# Patient Record
Sex: FEMALE | Race: WHITE | NOT HISPANIC OR LATINO | Employment: OTHER | URBAN - METROPOLITAN AREA
[De-identification: names, ages, dates, MRNs, and addresses within clinical notes are randomized per-mention and may not be internally consistent; named-entity substitution may affect disease eponyms.]

---

## 2017-01-04 ENCOUNTER — OFFICE VISIT (OUTPATIENT)
Dept: ALLERGY | Facility: CLINIC | Age: 27
End: 2017-01-04
Payer: MEDICAID

## 2017-01-04 ENCOUNTER — TELEPHONE (OUTPATIENT)
Dept: ALLERGY | Facility: CLINIC | Age: 27
End: 2017-01-04

## 2017-01-04 VITALS
BODY MASS INDEX: 33.55 KG/M2 | SYSTOLIC BLOOD PRESSURE: 110 MMHG | WEIGHT: 170.88 LBS | HEART RATE: 67 BPM | OXYGEN SATURATION: 100 % | DIASTOLIC BLOOD PRESSURE: 78 MMHG | HEIGHT: 60 IN

## 2017-01-04 DIAGNOSIS — J31.0 CHRONIC RHINITIS: ICD-10-CM

## 2017-01-04 DIAGNOSIS — J45.21 MILD INTERMITTENT ASTHMA WITH ACUTE EXACERBATION: Primary | ICD-10-CM

## 2017-01-04 PROCEDURE — 99214 OFFICE O/P EST MOD 30 MIN: CPT | Mod: S$PBB,,, | Performed by: ALLERGY & IMMUNOLOGY

## 2017-01-04 PROCEDURE — 99999 PR PBB SHADOW E&M-EST. PATIENT-LVL III: CPT | Mod: PBBFAC,,, | Performed by: ALLERGY & IMMUNOLOGY

## 2017-01-04 PROCEDURE — 99213 OFFICE O/P EST LOW 20 MIN: CPT | Mod: PBBFAC,PO | Performed by: ALLERGY & IMMUNOLOGY

## 2017-01-04 RX ORDER — ALBUTEROL SULFATE 90 UG/1
AEROSOL, METERED RESPIRATORY (INHALATION)
Qty: 8.5 G | Refills: 2 | Status: SHIPPED | OUTPATIENT
Start: 2017-01-04 | End: 2018-04-06 | Stop reason: SDUPTHER

## 2017-01-04 NOTE — TELEPHONE ENCOUNTER
Spoke to pt, she said insurance is denying inhalers - dr Cox sent in different one to see if it will work.

## 2017-01-04 NOTE — PROGRESS NOTES
Subjective:       Patient ID: Carlos Coulter is a 26 y.o. female.    Chief Complaint:  Asthma (shortness of breath, needing to use the inhaler more.)      HPI Comments: 26 year-old woman presents for continued evaluation of chronic rhinitis and mild asthma. She was last seen 1/18/2016. She had immunocaps drawn which were all negative. She had PFTs done which were normal. However if she does not stay on Q olivia daily she needs albuterol daily.In last few months she has more HARKINS> she walks daily and is ok but has a puppy and if walks faster or chases him she gets SOB. She is using albuterol most days right now. No night symptoms. She is also trying to get pregnant.  She takes Q olivia 80 1 puff BID. She does also take Singulair, Flonase and zyrtec all daily and this helps. She has had no rhinitis recently. She had a recent URI, had to use nebulizer at that time but resolved. No other infections.      Prior History taken 7/6/15: new patient evaluation of allergy and asthma. She is accompanied by her mom who is pt here for asthma. She has bipolar and is not best historian but is able to answer all questions, needs to be redirected often.   1. Asthma. She states she ha shad since was a baby. As small child (1-3) she was hospitalized for asthma, never ICU or intubated. Since then not been to ER or hospital. She rarely has flares where needs oral steroids. She does find she gets out of breath easily with walking far or chasing her 3 yo nephew. When this occurs she usually stops and rest and resolves. Rarely uses inhaler. She has Q olivia supposed to be on BID but does not do and rarely uses albuterol. No night symptoms. No oral steroids in many years.  2. Rhinitis. She is much worse in spring and some in summer. She gets congestion. Ears hurt, sore throat, stuffy nose, headaches, runny nose, sneeze and itchy watery eyes. Worse in evening time. Worse outside. Worse around dust and around some flowers. Takes Singulair  daily but no antihistamines. Takes Flonase daily but does have days where forgets. She had ear tubes as baby and had hearing loss as child which recovered. Not sure if ever had allergy test.  3. Food allergy. She reports pineapple caused her tongue and throat to swell and had to rush to ER. Grapefruit causes a rash on face and tongue swelling. She also for past year has trouble swallowing. Yesterday had rice get stuck in her throat. Dad and grandmother have history of dilation. She does often feel something come up in throat, like reflux.  No insect or latex allergy. Med allergies as per card.    Asthma   She complains of cough and shortness of breath. There is no wheezing. Associated symptoms include headaches, postnasal drip and rhinorrhea. Pertinent negatives include no appetite change, chest pain, ear pain, fever, myalgias, sneezing, sore throat or trouble swallowing. Her past medical history is significant for asthma.       Environmental History: Pets in the home: dogs (3) and cats (2).  see history section for home environment  Review of Systems   Constitutional: Negative for activity change, appetite change, chills, fatigue, fever and unexpected weight change.   HENT: Positive for hearing loss, nosebleeds, postnasal drip and rhinorrhea. Negative for congestion, drooling, ear discharge, ear pain, facial swelling, mouth sores, sinus pressure, sneezing, sore throat, tinnitus, trouble swallowing and voice change.    Eyes: Positive for itching. Negative for photophobia, pain, discharge and visual disturbance.   Respiratory: Positive for cough and shortness of breath. Negative for chest tightness, wheezing and stridor.    Cardiovascular: Negative for chest pain and palpitations.   Gastrointestinal: Negative for abdominal pain, constipation, diarrhea, nausea and vomiting.   Genitourinary: Negative for difficulty urinating.   Musculoskeletal: Negative for arthralgias, joint swelling, myalgias and neck pain.   Skin:  Negative for color change, pallor, rash and wound.   Allergic/Immunologic: Positive for environmental allergies and food allergies. Negative for immunocompromised state.   Neurological: Positive for headaches. Negative for dizziness, syncope, weakness, light-headedness and numbness.   Hematological: Negative for adenopathy. Does not bruise/bleed easily.   Psychiatric/Behavioral: Negative for self-injury and sleep disturbance.        Objective:    Physical Exam   Constitutional: She is oriented to person, place, and time. She appears well-developed and well-nourished. No distress.   HENT:   Head: Normocephalic and atraumatic.   Right Ear: Hearing, tympanic membrane, external ear and ear canal normal.   Left Ear: Hearing, tympanic membrane, external ear and ear canal normal.   Nose: No mucosal edema, rhinorrhea, sinus tenderness or septal deviation. No epistaxis. Right sinus exhibits no maxillary sinus tenderness and no frontal sinus tenderness. Left sinus exhibits no maxillary sinus tenderness and no frontal sinus tenderness.   Mouth/Throat: Uvula is midline, oropharynx is clear and moist and mucous membranes are normal. No uvula swelling.   Eyes: Conjunctivae are normal. Right eye exhibits no discharge. Left eye exhibits no discharge.   Neck: Normal range of motion. No thyromegaly present.   Cardiovascular: Normal rate, regular rhythm and normal heart sounds.    No murmur heard.  Pulmonary/Chest: Effort normal and breath sounds normal. No respiratory distress. She has no wheezes.   Abdominal: Soft. She exhibits no distension. There is no tenderness.   Musculoskeletal: Normal range of motion. She exhibits no edema or tenderness.   Lymphadenopathy:     She has no cervical adenopathy.   Neurological: She is alert and oriented to person, place, and time.   Skin: Skin is warm and dry. No rash noted. No erythema.   Psychiatric: She has a normal mood and affect. Her behavior is normal. Judgment and thought content normal.    Nursing note and vitals reviewed.      Laboratory:   none performed   Assessment:       1. Mild intermittent asthma with acute exacerbation    2. Chronic rhinitis         Plan:       1. Increase Q olivia 80 mcg to 2 puff BID  2. Continue Singulair daily and zyrtec or Claritin daily  3. Continue Nasacort 2 SEN daily  4. albuterol 2 puffs every 4 hours as needed  5. Advised once pregnant may change to Pulmicort and Rhinocort  6. RTC annually or sooner if needed

## 2017-01-05 ENCOUNTER — OFFICE VISIT (OUTPATIENT)
Dept: OBSTETRICS AND GYNECOLOGY | Facility: CLINIC | Age: 27
End: 2017-01-05
Payer: MEDICAID

## 2017-01-05 VITALS
BODY MASS INDEX: 33.38 KG/M2 | HEART RATE: 62 BPM | DIASTOLIC BLOOD PRESSURE: 20 MMHG | WEIGHT: 170 LBS | HEIGHT: 60 IN | SYSTOLIC BLOOD PRESSURE: 118 MMHG

## 2017-01-05 DIAGNOSIS — N92.6 IRREGULAR PERIODS: Primary | ICD-10-CM

## 2017-01-05 LAB
B-HCG UR QL: NEGATIVE
CTP QC/QA: YES

## 2017-01-05 PROCEDURE — 99999 PR PBB SHADOW E&M-EST. PATIENT-LVL III: CPT | Mod: PBBFAC,,, | Performed by: OBSTETRICS & GYNECOLOGY

## 2017-01-05 PROCEDURE — 99213 OFFICE O/P EST LOW 20 MIN: CPT | Mod: PBBFAC,PO | Performed by: OBSTETRICS & GYNECOLOGY

## 2017-01-05 PROCEDURE — 81025 URINE PREGNANCY TEST: CPT | Mod: PBBFAC,PO | Performed by: OBSTETRICS & GYNECOLOGY

## 2017-01-05 PROCEDURE — 99214 OFFICE O/P EST MOD 30 MIN: CPT | Mod: S$PBB,,, | Performed by: OBSTETRICS & GYNECOLOGY

## 2017-01-05 RX ORDER — MEDROXYPROGESTERONE ACETATE 10 MG/1
10 TABLET ORAL DAILY
Qty: 10 TABLET | Refills: 0 | Status: SHIPPED | OUTPATIENT
Start: 2017-01-05 | End: 2017-01-12 | Stop reason: SDUPTHER

## 2017-01-05 NOTE — PROGRESS NOTES
"Chief Complaint   Patient presents with    Infertility       History of Present Illness: Carlos Coulter is a 26 y.o. female that presents today 1/5/2017 for   Chief Complaint   Patient presents with    Infertility   she reports unprotected for the last 7 years. She reports that she has not been trying. She reports normal monthly periods. She reports being seen by endocrine and psychiatry. She reports seeing Dr. Seema FIGUEROA for years for the bipolar and she was diagnosed "as a kid"     Past Medical History   Diagnosis Date    ADHD (attention deficit hyperactivity disorder)      sees psych    AR (allergic rhinitis)      causes frequent ear problems    Asthma      since childhood, some wheeze with exertion    Bilateral club feet      at birth; casted B    Bipolar disorder      sees psych    Constipation     Depression      sees psych    Eczema     GERD (gastroesophageal reflux disease)     Growth hormone deficiency      tx with growth hormone ages 12 - 15 by Dr Brewer    Heart murmur      saw cardiology 9/2014, intermittent, asymptomatic    Hypothyroidism      sees Dr Brewer (endo)    Insomnia     Learning disability     Speech abnormality        Past Surgical History   Procedure Laterality Date    Tympanostomy tube placement  age 4    Adenoidectomy  age 4    Foot surgery  11/13/2014. 3/2015     L foot    Foot surgery Right 10/2014     Right foot surgery    Muscle release Bilateral      Lower extremities due to congenital club feet    Multiple tooth extractions      Knee surgery  right/11/2015       Current Outpatient Prescriptions   Medication Sig Dispense Refill    albuterol (PROAIR HFA) 90 mcg/actuation inhaler INHALE 1 PUFF EVERY 4 HOURS PRN for Wheezing 8.5 g 2    albuterol (PROVENTIL) 2.5 mg /3 mL (0.083 %) nebulizer solution Take 3 mLs (2.5 mg total) by nebulization every 6 (six) hours as needed for Wheezing. 1 Box 3    aripiprazole (ABILIFY) 5 MG Tab Take 5 mg by mouth once daily. "      beclomethasone (QVAR) 80 mcg/actuation Aero Inhale 2 puffs into the lungs 2 (two) times daily. 1 each 12    fluticasone (FLONASE) 50 mcg/actuation nasal spray INSTILL 2 SPRAYS IN EACH NOSTRIL EVERY DAY 1 Bottle 5    inhalation device (BREATHERITE VALVED MDI SPACER) Use as directed for inhalation. 1 Device 0    levothyroxine (SYNTHROID) 88 MCG tablet TAKE 1 TABLET BY MOUTH EVERY MORNING BEFORE BREAKFAST 90 tablet 1    lisdexamfetamine (VYVANSE) 40 MG Cap Take 40 mg by mouth once daily.      montelukast (SINGULAIR) 10 mg tablet Take 1 tablet (10 mg total) by mouth once daily. 90 tablet 3    ranitidine (ZANTAC) 300 MG tablet TAKE 1 TABLET BY MOUTH NIGHTLY 90 tablet 0    trazodone (DESYREL) 100 MG tablet Take 1 tablet by mouth every evening.  0    trazodone (DESYREL) 50 MG tablet Take 50 mg by mouth nightly as needed for Insomnia.      triamcinolone acetonide 0.1% (KENALOG) 0.1 % ointment AAA bid x 2 weeks then prn, avoid chronic use 454 g 1    clobetasol (TEMOVATE) 0.05 % cream Apply 1 application topically daily as needed.       medroxyPROGESTERone (PROVERA) 10 MG tablet Take 1 tablet (10 mg total) by mouth once daily. 10 tablet 0    prenatal vit#103-iron-FA () 27 mg iron- 1 mg Tab Take 1 tablet by mouth once daily. 30 tablet 11     No current facility-administered medications for this visit.        Review of patient's allergies indicates:   Allergen Reactions    Grapefruit Anaphylaxis    Pineapple Anaphylaxis    Augmentin [amoxicillin-pot clavulanate] Hives    Penicillins Hives    Tamiflu [oseltamivir] Diarrhea and Nausea And Vomiting       Family History   Problem Relation Age of Onset    Diabetes Mother     Heart disease Mother     Thyroid disease Mother     Depression Mother     Asthma Mother     Arthritis Mother     Allergic rhinitis Mother     Depression Father     Migraines Father     Thyroid disease Father     PAVEL disease Father     Early death Brother       1 hour  after birth    Thyroid disease Maternal Grandmother     Seizures Maternal Grandmother     Clotting disorder Maternal Grandmother     Pulmonary fibrosis Maternal Grandfather     Diabetes Paternal Grandmother     Arthritis Paternal Grandmother     Schizophrenia Paternal Grandmother     Depression Paternal Grandmother     Early death Paternal Grandfather 54    Aneurysm Paternal Grandfather     Angioedema Neg Hx     Immunodeficiency Neg Hx     Urticaria Neg Hx     Collagen disease Neg Hx        Social History   Substance Use Topics    Smoking status: Former Smoker     Quit date: 2014    Smokeless tobacco: Never Used    Alcohol use 0.6 oz/week     1 Cans of beer per week      Comment: occasionally       OB History    Para Term  AB SAB TAB Ectopic Multiple Living   1    1 1          # Outcome Date GA Lbr Luis/2nd Weight Sex Delivery Anes PTL Lv   1 2008                  Review of Symptoms:  GENERAL: Denies weight gain or weight loss. Feeling well overall.   SKIN: Denies rash or lesions.   HEAD: Denies head injury or headache.   NODES: Denies enlarged lymph nodes.   CHEST: Denies chest pain or shortness of breath.   CARDIOVASCULAR: Denies palpitations or left sided chest pain.   ABDOMEN: No abdominal pain, constipation, diarrhea, nausea, vomiting or rectal bleeding.   URINARY: No frequency, dysuria, hematuria, or burning on urination.  HEMATOLOGIC: No easy bruisability or excessive bleeding.   MUSCULOSKELETAL: Denies joint pain or swelling.     Visit Vitals    BP (!) 118/20 (BP Location: Left arm, Patient Position: Sitting, BP Method: Manual)    Pulse 62    Ht 5' (1.524 m)    Wt 77.1 kg (169 lb 15.6 oz)    LMP 2016 (Approximate)     Physical Exam:  APPEARANCE: Well nourished, well developed, in no acute distress.  SKIN: Normal skin turgor, no lesions.  NECK: Neck symmetric without masses   RESPIRATORY: Normal respiratory effort with no retractions or use of accessory  muscles  CARDIOVASCULAR: Peripheral vascular system with no swelling no varicosities and palpation of pulses normal  LYMPHATIC: No enlargements of the lymph nodes noted in the neck, axillae, or groin  ABDOMEN: Soft. No tenderness or masses. No hepatosplenomegaly. No hernias.  EXTREMITIES: No clubbing cyanosis or edema.    ASSESSMENT/PLAN:  Irregular periods  -     POCT Urine Pregnancy  -     prenatal vit#103-iron-FA () 27 mg iron- 1 mg Tab; Take 1 tablet by mouth once daily.  Dispense: 30 tablet; Refill: 11  -     medroxyPROGESTERone (PROVERA) 10 MG tablet; Take 1 tablet (10 mg total) by mouth once daily.  Dispense: 10 tablet; Refill: 0      30 minutes spent today with greater than half in counseling.

## 2017-01-05 NOTE — LETTER
January 5, 2017      Izzy Toney, DREW  [address] [address] [address]           [facility] - OB/GYN  [address] [facility] [address]  Phone: [phone]          Patient: Carlos Coulter   MR Number: [id number]   YOB: 1990   Date of Visit: 1/5/2017       Dear Izzy Toney:    Thank you for referring Carlos Coulter to me for evaluation. Attached you will find relevant portions of my assessment and plan of care.    If you have questions, please do not hesitate to call me. I look forward to following Carlos Coulter along with you.    Sincerely,    Dominique Hall MD    Enclosure  CC:  No Recipients    If you would like to receive this communication electronically, please contact [email or web address] or [phone] to request more information on [employer] Link access.    For providers and/or their staff who would like to refer a patient to [facility], please contact us through our one-stop-shop provider referral line, [facility], at [phone].    If you feel you have received this communication in error or would no longer like to receive these types of communications, please e-mail [email or web address]

## 2017-01-05 NOTE — MR AVS SNAPSHOT
[facility] - OB/GYN  [address] [place] [address]  Phone: [phone]                  Carlos Coulter   2017 8:20 AM   Office Visit    Description:  Female : 1990   Provider:  Dominique Hall MD   Department:  [facility] - OB/GYN           Reason for Visit     Infertility                To Do List           Goals (5 Years of Data)              3/31/16    11/30/15    11/18/15    BMI is less than 25   Not on track  Not on track  Not on track      [facility]     [facility] Nurse Care Line -  Assistance  Registered nurses in the [facility] provide clinical advisement, health education, appointment booking, and other advisory services.  Call for this free service at [phone].             Medications           Message regarding Medications     Verify the changes and/or additions to your medication regime listed below are the same as discussed with your clinician today.  If any of these changes or additions are incorrect, please notify your healthcare provider.        STOP taking these medications     PNV NO.122/IRON/FOLIC ACID (PRENATAL MULTI ORAL) Take by mouth once daily.           Verify that the below list of medications is an accurate representation of the medications you are currently taking.  If none reported, the list may be blank. If incorrect, please contact your healthcare provider. Carry this list with you in case of emergency.           Current Medications     albuterol (PROAIR HFA) 90 mcg/actuation inhaler INHALE 1 PUFF EVERY 4 HOURS PRN for Wheezing    albuterol (PROVENTIL) 2.5 mg /3 mL (0.083 %) nebulizer solution Take 3 mLs (2.5 mg total) by nebulization every 6 (six) hours as needed for Wheezing.    aripiprazole (ABILIFY) 5 MG Tab Take 5 mg by mouth once daily.    beclomethasone (QVAR) 80 mcg/actuation Aero Inhale 2 puffs into the lungs 2 (two) times daily.    fluticasone (FLONASE) 50 mcg/actuation nasal spray INSTILL  2 SPRAYS IN EACH NOSTRIL EVERY DAY    inhalation device (BREATHERITE VALVED MDI SPACER) Use as directed for inhalation.    levothyroxine (SYNTHROID) 88 MCG tablet TAKE 1 TABLET BY MOUTH EVERY MORNING BEFORE BREAKFAST    lisdexamfetamine (VYVANSE) 40 MG Cap Take 40 mg by mouth once daily.    montelukast (SINGULAIR) 10 mg tablet Take 1 tablet (10 mg total) by mouth once daily.    ranitidine (ZANTAC) 300 MG tablet TAKE 1 TABLET BY MOUTH NIGHTLY    trazodone (DESYREL) 100 MG tablet Take 1 tablet by mouth every evening.    trazodone (DESYREL) 50 MG tablet Take 50 mg by mouth nightly as needed for Insomnia.    triamcinolone acetonide 0.1% (KENALOG) 0.1 % ointment AAA bid x 2 weeks then prn, avoid chronic use    clobetasol (TEMOVATE) 0.05 % cream Apply 1 application topically daily as needed.            Clinical Reference Information           Vital Signs - Last Recorded  Most recent update: 1/5/2017  8:19 AM by Mayra Santos LPN    BP Pulse Ht Wt LMP BMI    (!) 118/20 (BP Location: Left arm, Patient Position: Sitting, BP Method: Manual) 62 5' (1.524 m) 77.1 kg (169 lb 15.6 oz) 11/16/2016 (Approximate) 33.2 kg/m2      Blood Pressure          Most Recent Value    BP  (!)  118/20 [Large cuff]      Allergies as of 1/5/2017     Grapefruit    Pineapple    Augmentin [Amoxicillin-pot Clavulanate]    Penicillins    Tamiflu [Oseltamivir]      Immunizations Administered on Date of Encounter - 1/5/2017     None

## 2017-01-07 RX ORDER — FLUTICASONE PROPIONATE 50 UG/1
SPRAY, METERED NASAL
Qty: 1 BOTTLE | Refills: 11 | Status: SHIPPED | OUTPATIENT
Start: 2017-01-07 | End: 2018-03-02

## 2017-01-09 ENCOUNTER — OFFICE VISIT (OUTPATIENT)
Dept: FAMILY MEDICINE | Facility: CLINIC | Age: 27
End: 2017-01-09
Payer: MEDICAID

## 2017-01-09 VITALS
BODY MASS INDEX: 32.64 KG/M2 | HEART RATE: 67 BPM | SYSTOLIC BLOOD PRESSURE: 102 MMHG | WEIGHT: 166.25 LBS | DIASTOLIC BLOOD PRESSURE: 68 MMHG | OXYGEN SATURATION: 97 % | HEIGHT: 60 IN | TEMPERATURE: 99 F

## 2017-01-09 DIAGNOSIS — K22.2 SCHATZKI'S RING OF DISTAL ESOPHAGUS: ICD-10-CM

## 2017-01-09 DIAGNOSIS — R13.10 DYSPHAGIA, UNSPECIFIED TYPE: ICD-10-CM

## 2017-01-09 DIAGNOSIS — K21.9 GASTROESOPHAGEAL REFLUX DISEASE, ESOPHAGITIS PRESENCE NOT SPECIFIED: ICD-10-CM

## 2017-01-09 DIAGNOSIS — K22.89 ESOPHAGEAL PAIN: Primary | ICD-10-CM

## 2017-01-09 PROCEDURE — 99214 OFFICE O/P EST MOD 30 MIN: CPT | Mod: S$GLB,,, | Performed by: NURSE PRACTITIONER

## 2017-01-09 RX ORDER — SUCRALFATE 1 G/1
1 TABLET ORAL 4 TIMES DAILY
Qty: 40 TABLET | Refills: 0 | Status: SHIPPED | OUTPATIENT
Start: 2017-01-09 | End: 2017-05-08

## 2017-01-09 NOTE — MR AVS SNAPSHOT
[facility]  [address] [address]  [facility] [address]  Phone: [phone]  Fax: [phone]                  Carlos Coulter   2017 8:20 AM   Office Visit    Description:  Female : 1990   Provider:  Izzy Toney NP   Department:  [facility]           Reason for Visit     Gastroesophageal Reflux           Diagnoses this Visit        Comments    Esophageal pain    -  Primary     Gastroesophageal reflux disease, esophagitis presence not specified         Schatzki's ring of distal esophagus         Dysphagia, unspecified type                To Do List           Future Appointments        Provider Department Dept Phone    2017 8:30 AM Mayra Coffey [facility] Gastroenterology [phone]      Goals (5 Years of Data)              3/31/16    11/30/15    11/18/15    BMI is less than 25   Not on track  Not on track  Not on track       These Medications        Disp Refills Start End    sucralfate (CARAFATE) 1 gram tablet 40 tablet 0 2017     Take 1 tablet (1 g total) by mouth 4 (four) times daily. - Oral    Pharmacy: [facility] [address] [place] [address] [address] Ph #: [phone]         [facility] On [facility]     [facility] On [facility] Nurse Care Line - 24/7 Assistance  Registered nurses in the [facility] provide clinical advisement, health education, appointment booking, and other advisory services.  Call for this free service at [phone].             Medications           Message regarding Medications     Verify the changes and/or additions to your medication regime listed below are the same as discussed with your clinician today.  If any of these changes or additions are incorrect, please notify your healthcare provider.        START taking these NEW medications        Refills    sucralfate (CARAFATE) 1 gram tablet 0    Sig: Take 1 tablet (1 g total)  by mouth 4 (four) times daily.    Class: Normal    Route: Oral           Verify that the below list of medications is an accurate representation of the medications you are currently taking.  If none reported, the list may be blank. If incorrect, please contact your healthcare provider. Carry this list with you in case of emergency.           Current Medications     albuterol (PROAIR HFA) 90 mcg/actuation inhaler INHALE 1 PUFF EVERY 4 HOURS PRN for Wheezing    albuterol (PROVENTIL) 2.5 mg /3 mL (0.083 %) nebulizer solution Take 3 mLs (2.5 mg total) by nebulization every 6 (six) hours as needed for Wheezing.    aripiprazole (ABILIFY) 5 MG Tab Take 5 mg by mouth once daily.    beclomethasone (QVAR) 80 mcg/actuation Aero Inhale 2 puffs into the lungs 2 (two) times daily.    clobetasol (TEMOVATE) 0.05 % cream Apply 1 application topically daily as needed.     FLONASE ALLERGY RELIEF 50 mcg/actuation nasal spray SHAKE WELL AND USE 2 SPRAYS IN EACH NOSTRIL EVERY DAY    inhalation device (BREATHERITE VALVED MDI SPACER) Use as directed for inhalation.    levothyroxine (SYNTHROID) 88 MCG tablet TAKE 1 TABLET BY MOUTH EVERY MORNING BEFORE BREAKFAST    lisdexamfetamine (VYVANSE) 40 MG Cap Take 40 mg by mouth once daily.    medroxyPROGESTERone (PROVERA) 10 MG tablet Take 1 tablet (10 mg total) by mouth once daily.    montelukast (SINGULAIR) 10 mg tablet Take 1 tablet (10 mg total) by mouth once daily.    prenatal vit#103-iron-FA () 27 mg iron- 1 mg Tab Take 1 tablet by mouth once daily.    ranitidine (ZANTAC) 300 MG tablet TAKE 1 TABLET BY MOUTH NIGHTLY    trazodone (DESYREL) 100 MG tablet Take 1 tablet by mouth every evening.    trazodone (DESYREL) 50 MG tablet Take 50 mg by mouth nightly as needed for Insomnia.    triamcinolone acetonide 0.1% (KENALOG) 0.1 % ointment AAA bid x 2 weeks then prn, avoid chronic use    sucralfate (CARAFATE) 1 gram tablet Take 1 tablet (1 g total) by mouth 4 (four) times daily.            Clinical Reference Information           Vital Signs - Last Recorded  Most recent update: 1/9/2017  8:25 AM by Cassi Garnica LPN    BP Pulse Temp Ht    102/68 (BP Location: Left arm, Patient Position: Sitting, BP Method: Manual) 67 99.1 °F (37.3 °C) (Oral) 5' (1.524 m)    Wt LMP SpO2 BMI    75.4 kg (166 lb 3.6 oz) 01/07/2017 (Exact Date) 97% 32.46 kg/m2      Blood Pressure          Most Recent Value    BP  102/68      Allergies as of 1/9/2017     Grapefruit    Pineapple    Augmentin [Amoxicillin-pot Clavulanate]    Penicillins    Tamiflu [Oseltamivir]      Immunizations Administered on Date of Encounter - 1/9/2017     None      Orders Placed During Today's Visit      Normal Orders This Visit    Ambulatory referral to Gastroenterology     Case request GI: ESOPHAGOGASTRODUODENOSCOPY (EGD)

## 2017-01-09 NOTE — PROGRESS NOTES
Subjective:       Patient ID: Carlos Coulter is a 26 y.o. female.    Chief Complaint: Gastroesophageal Reflux    HPI states that she is having pain when she swallows for the past month and that she feels like food is trying to get stuck. She has been drinking protein shakes because solid foods hurt. She states now even drinking water is painful sometimes. She is taking the zantac 300mg at bedtime. States that she has to take small bites and swallow slowly then wait a little bit before she can do it again. She had EGD in May 2016 which showed a schatzki's ring that was dilated. She states that she has had to throw up food a few times. She has no other concerns. See ROS.    The following portion of the patients history was reviewed and updated as appropriate: allergies, current medications, past medical and surgical history. Past social history and problem list reviewed. Family PMH and Past social history reviewed. Tobacco, Illicit drug use reviewed.     Review of Systems   Constitutional: Negative for chills, fatigue and fever.   HENT: Negative.    Respiratory: Negative for cough, chest tightness, shortness of breath and wheezing.    Cardiovascular: Negative for chest pain and palpitations.   Gastrointestinal: Positive for abdominal pain and nausea. Negative for constipation, diarrhea and vomiting.        Pain in the epigastric area. See HPI   Musculoskeletal: Negative.    Skin: Negative.    Hematological: Negative.        Objective:       Visit Vitals    /68 (BP Location: Left arm, Patient Position: Sitting, BP Method: Manual)    Pulse 67    Temp 99.1 °F (37.3 °C) (Oral)    Ht 5' (1.524 m)    Wt 75.4 kg (166 lb 3.6 oz)    LMP 01/07/2017 (Exact Date)    SpO2 97%    BMI 32.46 kg/m2     Physical Exam     Constitutional: oriented to person, place, and time. well-developed and well-nourished.   Cardiovascular: Normal rate, regular rhythm and normal heart sounds.    Pulmonary/Chest: Effort normal  and breath sounds normal. No respiratory distress. No wheezes.   Abdominal: Soft. Bowel sounds are normal. No distension. There is tenderness with palpation along the epigastric area.   Musculoskeletal: Normal range of motion. Gait and coordination normal.   Neurological: oriented to person, place, and time.   Skin: Skin is warm and dry. No rashes or lesions    Assessment:       1. Esophageal pain    2. Gastroesophageal reflux disease, esophagitis presence not specified    3. Schatzki's ring of distal esophagus    4. Dysphagia, unspecified type        Plan:         Carlos was seen today for gastroesophageal reflux.    Diagnoses and all orders for this visit:    Esophageal pain: will refer to GI.   -     Case request GI: ESOPHAGOGASTRODUODENOSCOPY (EGD)  -     Ambulatory referral to Gastroenterology    Gastroesophageal reflux disease, esophagitis presence not specified  -     Case request GI: ESOPHAGOGASTRODUODENOSCOPY (EGD)  -     Ambulatory referral to Gastroenterology    Schatzki's ring of distal esophagus: was dilated this past June.     Dysphagia, unspecified type    Other orders  -     sucralfate (CARAFATE) 1 gram tablet; Take 1 tablet (1 g total) by mouth 4 (four) times daily.    Continue current medication  Take medications only as prescribed  Healthy diet, exercise  Adequate rest  Adequate hydration  Avoid allergens  Avoid excessive caffeine

## 2017-01-12 ENCOUNTER — OFFICE VISIT (OUTPATIENT)
Dept: GASTROENTEROLOGY | Facility: CLINIC | Age: 27
End: 2017-01-12
Payer: MEDICAID

## 2017-01-12 VITALS
HEIGHT: 60 IN | WEIGHT: 170.19 LBS | RESPIRATION RATE: 18 BRPM | SYSTOLIC BLOOD PRESSURE: 110 MMHG | HEART RATE: 64 BPM | BODY MASS INDEX: 33.41 KG/M2 | DIASTOLIC BLOOD PRESSURE: 76 MMHG

## 2017-01-12 DIAGNOSIS — K21.00 GASTROESOPHAGEAL REFLUX DISEASE WITH ESOPHAGITIS: Primary | ICD-10-CM

## 2017-01-12 DIAGNOSIS — R13.14 PHARYNGOESOPHAGEAL DYSPHAGIA: ICD-10-CM

## 2017-01-12 DIAGNOSIS — N92.6 IRREGULAR PERIODS: ICD-10-CM

## 2017-01-12 DIAGNOSIS — R12 HEARTBURN: ICD-10-CM

## 2017-01-12 DIAGNOSIS — R07.9 NONSPECIFIC CHEST PAIN: ICD-10-CM

## 2017-01-12 PROCEDURE — 99999 PR PBB SHADOW E&M-EST. PATIENT-LVL IV: CPT | Mod: PBBFAC,,, | Performed by: NURSE PRACTITIONER

## 2017-01-12 PROCEDURE — 99214 OFFICE O/P EST MOD 30 MIN: CPT | Mod: PBBFAC,PO | Performed by: NURSE PRACTITIONER

## 2017-01-12 PROCEDURE — 99214 OFFICE O/P EST MOD 30 MIN: CPT | Mod: S$PBB,,, | Performed by: NURSE PRACTITIONER

## 2017-01-12 RX ORDER — PANTOPRAZOLE SODIUM 40 MG/1
40 TABLET, DELAYED RELEASE ORAL DAILY
Qty: 30 TABLET | Refills: 6 | Status: SHIPPED | OUTPATIENT
Start: 2017-01-12 | End: 2017-05-08

## 2017-01-12 RX ORDER — MEDROXYPROGESTERONE ACETATE 10 MG/1
TABLET ORAL
Qty: 10 TABLET | Refills: 0 | Status: SHIPPED | OUTPATIENT
Start: 2017-01-12 | End: 2017-01-19 | Stop reason: SDUPTHER

## 2017-01-12 NOTE — PROGRESS NOTES
Subjective:       Patient ID: Carlos Coulter is a 26 y.o. female Body mass index is 33.24 kg/(m^2).    Chief Complaint: Gastroesophageal Reflux and Dysphagia    This patient is new to me.  Referring Provider:  YOSELIN Toney NP for GERD & esophageal pain  Established patient of Dr. Nguyen.    Gastroesophageal Reflux   She complains of chest pain (rated 2/10 when it occurs, reports occurs when she eats/drinks and gets stuck, denies currently), coughing (occasional, dry; relates to allergies), dysphagia (relieved after EGD with dilation), globus sensation (frequent throat clearing), heartburn, a hoarse voice, nausea (occasional mild, resolved) and water brash. She reports no abdominal pain, no belching, no choking or no sore throat. This is a chronic problem. Episode onset: several years. The problem occurs frequently. The problem has been gradually worsening (over the past couple of months). The heartburn wakes her from sleep. The symptoms are aggravated by certain foods and caffeine (high fat food). Associated symptoms include weight loss (trying to lose weight, stable lately). Pertinent negatives include no fatigue or melena. Risk factors include obesity and caffeine use. She has tried a histamine-2 antagonist, a diet change and ETOH reduction (zantac 300 mg only PRN, carafate 1 gram qid helping; diet change) for the symptoms. The treatment provided moderate relief. Past procedures include an EGD.     Review of Systems   Constitutional: Positive for weight loss (trying to lose weight, stable lately). Negative for appetite change, chills, fatigue, fever and unexpected weight change.   HENT: Positive for hoarse voice and trouble swallowing. Negative for sore throat.    Eyes: Negative for visual disturbance.   Respiratory: Positive for cough (occasional, dry; relates to allergies). Negative for choking, chest tightness and shortness of breath.    Cardiovascular: Positive for chest pain (rated 2/10 when it  occurs, reports occurs when she eats/drinks and gets stuck, denies currently). Negative for palpitations.   Gastrointestinal: Positive for constipation (history of chronic constipation; bowel movements are once daily to every other day; controlled with OTC medication taken prn; denies change in bowel habits), dysphagia (relieved after EGD with dilation), heartburn, nausea (occasional mild, resolved) and vomiting (once she woke up and reflux of food contents and bile; denies bright red blood or coffee grounds). Negative for abdominal pain, anal bleeding, blood in stool, diarrhea, melena and rectal pain.   Genitourinary: Negative for difficulty urinating, dysuria, flank pain, frequency, pelvic pain and urgency.   Neurological: Negative for weakness and headaches.       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 3/2015     L foot    Foot surgery Right 10/2014     Right foot surgery    Muscle release Bilateral      Lower extremities due to congenital club feet    Multiple tooth extractions      Knee surgery  right/11/2015    Upper gastrointestinal endoscopy  05/2016     Dr. Nguyen     Family History   Problem Relation Age  "of Onset    Diabetes Mother     Heart disease Mother     Thyroid disease Mother     Depression Mother     Asthma Mother     Arthritis Mother     Allergic rhinitis Mother     Colon polyps Mother     Depression Father     Migraines Father     Thyroid disease Father     PAVEL disease Father     Early death Brother       1 hour after birth    Thyroid disease Maternal Grandmother     Seizures Maternal Grandmother     Clotting disorder Maternal Grandmother     Pulmonary fibrosis Maternal Grandfather     Colon cancer Maternal Grandfather     Diabetes Paternal Grandmother     Arthritis Paternal Grandmother     Schizophrenia Paternal Grandmother     Depression Paternal Grandmother     Early death Paternal Grandfather 54    Aneurysm Paternal Grandfather     Angioedema Neg Hx     Immunodeficiency Neg Hx     Urticaria Neg Hx     Collagen disease Neg Hx      Wt Readings from Last 10 Encounters:   17 77.2 kg (170 lb 3.1 oz)   17 75.4 kg (166 lb 3.6 oz)   17 77.1 kg (169 lb 15.6 oz)   17 77.5 kg (170 lb 13.7 oz)   16 75.7 kg (166 lb 14.2 oz)   16 73.9 kg (163 lb)   16 74 kg (163 lb 2.3 oz)   16 75 kg (165 lb 5.5 oz)   16 75.8 kg (167 lb 1.7 oz)   16 72.2 kg (159 lb 3.2 oz)     16 EGD was reviewed and procedure report states:   " Impression:          - Tae-Jude Grade I reflux esophagitis. Biopsied.                       - Mild Schatzki ring. Dilated.                       - Normal stomach.                       - Normal examined duodenum.  Recommendation:      - Await pathology results.                       - Continue present medications.                       - Repeat the upper endoscopy PRN for retreatment.                       - Discharge patient to home (ambulatory). ".  Biopsy results:   "Distal esophagus, biopsy:  Moderate chronic reflux esophagitis with reactive epithelial changes; no definitive evidence of " "dysplasia  Adjacent gastric-type glandular mucosa with no significant histopathologic findings  Multiple levels are examined."  Objective:      Physical Exam   Constitutional: She is oriented to person, place, and time. She appears well-developed and well-nourished. No distress.   HENT:   Mouth/Throat: Oropharynx is clear and moist and mucous membranes are normal. No oral lesions. No oropharyngeal exudate.   Eyes: Conjunctivae are normal. Pupils are equal, round, and reactive to light. No scleral icterus.   Neck: Normal range of motion. Neck supple. No tracheal deviation present.   Cardiovascular: Normal rate.    Pulmonary/Chest: Effort normal and breath sounds normal. No respiratory distress. She has no wheezes.   Abdominal: Soft. Normal appearance and bowel sounds are normal. She exhibits no distension, no abdominal bruit, no ascites and no mass. There is no hepatosplenomegaly. There is no tenderness. There is no rigidity, no rebound, no guarding, no tenderness at McBurney's point and negative Venegas's sign. No hernia.   Lymphadenopathy:     She has no cervical adenopathy.   Neurological: She is alert and oriented to person, place, and time.   Skin: Skin is warm and dry. No rash noted. She is not diaphoretic. No erythema. No pallor.   Non-jaundiced   Psychiatric: She has a normal mood and affect. Her behavior is normal.   Nursing note and vitals reviewed.      Assessment:       1. Gastroesophageal reflux disease with esophagitis    2. Heartburn    3. Pharyngoesophageal dysphagia    4. Nonspecific chest pain        Plan:       Gastroesophageal reflux disease with esophagitis & Heartburn  -     FL Upper GI Air Contrast With KUB; Future; Expected date: 1/12/17  - discontinue zantac due to patient not taking due to size of tablet and does not get much relief from it  -   START  pantoprazole (PROTONIX) 40 MG tablet; Take 1 tablet (40 mg total) by mouth once daily. Before breakfast  Dispense: 30 tablet; Refill: 6, - " take in the morning before breakfast 60 minutes after synthroid, discussed about possible long term use of medication (prefer to use lowest effective dose or discontinuing if possible) and discussed the risks & benefits with taking a reflux medication long term, and to take OTC calcium and vitamin d supplements as directed (such as Citracal +D), pt verbalized understanding  -discussed about the different types of medications used to treat reflux and how to use them, antacids can be used PRN for breakthrough heartburn symptoms by reducing stomach acid that is already produced, H2 blockers (zantac) work by limiting the amount acid production, & PPI's work to block acid production and are taken daily, patient verbalized understanding.  -Educated patient on lifestyle modifications to help control reflux including: avoid large meals, avoid eating within 2-3 hours of bedtime (avoid late night eating & lying down soon after eating), elevate head of bed if nocturnal symptoms are present, smoking cessation (if current smoker), & weight loss (if overweight).   -Educated to avoid known foods which trigger reflux symptoms & to minimize/avoid high-fat foods, chocolate, caffeine, citrus, alcohol, & tomato products.  -Advised to avoid/limit use of NSAID's, since they can cause GI upset, bleeding, and/or ulcers. If needed, take with food.  - continue carafate 1 gram QID as directed  - schedule EGD, discussed procedure with patient, verbalized understanding    Pharyngoesophageal dysphagia  -     FL Upper GI Air Contrast With KUB; Future; Expected date: 1/12/17  -  START   pantoprazole (PROTONIX) 40 MG tablet; Take 1 tablet (40 mg total) by mouth once daily. Before breakfast  Dispense: 30 tablet; Refill: 6  - schedule EGD, discussed procedure with patient and possible esophageal dilation may be performed during procedure if indicated, patient verbalized understanding  - educated patient to eat smaller more frequent meals and to eat  slowly and advised to eat soft diet.    Nonspecific chest pain  -     FL Upper GI Air Contrast With KUB; Future; Expected date: 1/12/17  -   START  pantoprazole (PROTONIX) 40 MG tablet; Take 1 tablet (40 mg total) by mouth once daily. Before breakfast  Dispense: 30 tablet; Refill: 6  - follow-up with PCP &/or cardiologist for continued evaluation and management  - currently patient denies headache, chest pain, shortness of breath, or blurred vision, educated patient that if symptoms occur to go to follow-up with PCP/cardiologist or ER, patient verbalized understanding.    Return in about 2 months (around 3/12/2017), or if symptoms worsen or fail to improve.    If no improvement in symptoms or symptoms worsen, call/follow-up at clinic or go to ER.

## 2017-01-12 NOTE — LETTER
January 12, 2017      Izzy Toney, DREW  [address] [address] [address]           [facility]  [address] [facility] [address] [place] [address]  Phone: [phone]          Patient: Carlos Coulter   MR Number: [id number]   YOB: 1990   Date of Visit: 1/12/2017       Dear Izzy Toney:    Thank you for referring Carlos Coulter to me for evaluation. Attached you will find relevant portions of my assessment and plan of care.    If you have questions, please do not hesitate to call me. I look forward to following Carlos Coulter along with you.    Sincerely,    Mayra Coffey, [facility]    Enclosure  CC:  No Recipients    If you would like to receive this communication electronically, please contact [email or web address] or [phone] to request more information on [employer] access.    For providers and/or their staff who would like to refer a patient to [facility], please contact us through our one-stop-shop provider referral line, [facility], at [phone].    If you feel you have received this communication in error or would no longer like to receive these types of communications, please e-mail [email or web address]

## 2017-01-12 NOTE — PATIENT INSTRUCTIONS
"  GERD (Adult)    The esophagus is a tube that carries food from the mouth to the stomach. A valve at the lower end of the esophagus prevents stomach acid from flowing upward. When this valve doesn't work properly, stomach contents may repeatedly flow back up (reflux) into the esophagus. This is called gastroesophageal reflux disease (GERD). GERD can irritate the esophagus. It can cause problems with swallowing or breathing. In severe cases, GERD can cause recurrent pneumonia or other serious problems.  Symptoms of reflux include burning, pressure or sharp pain in the upper abdomen or mid to lower chest. The pain can spread to the neck, back, or shoulder. There may be belching, an acid taste in the back of the throat, chronic cough, or sore throat or hoarseness. GERD symptoms often occur during the day after a big meal. They can also occur at night when lying down.   Home care  Lifestyle changes can help reduce symptoms. If needed, medicines may be prescribed. Symptoms often improve with treatment, but if treatment is stopped, the symptoms often return after a few months. So most persons with GERD will need to continue treatment.  Lifestyle changes  · Limit or avoid fatty, fried, and spicy foods, as well as coffee, chocolate, mint, and foods with high acid content such as tomatoes and citrus fruit and juices (orange, grapefruit, lemon).  · Dont eat large meals, especially at night. Frequent, smaller meals are best. Do not lie down right after eating. And dont eat anything 3 hours before going to bed.  · Avoid drinking alcohol and smoking. As much as possible, stay away from second hand smoke.  · If you are overweight, losing weight will reduce symptoms.   · Avoid wearing tight clothing around your stomach area.  · If your symptoms occur during sleep, use a foam wedge to elevate your upper body (not just your head.) Or, place 4" blocks under the head of your bed.  Medicines  If needed, medicines can help relieve " the symptoms of GERD and prevent damage to the esophagus. Discuss a medicine plan with your healthcare provider. This may include one or more of the following medicines:  · Antacids to help neutralize the normal acids in your stomach.  · Acid blockers (H2 blockers) to decrease acid production.  · Acid inhibitors (PPIs) to decrease acid production in a different way than the blockers. They may work better, but can take a little longer to take effect.  Take an antacid 30-60 minutes after eating and at bedtime, but not at the same time as an acid blocker.  Try not to take medicines such as ibuprofen and aspirin. If you are taking aspirin for your heart or other medical reasons, talk to your healthcare provider about stopping it.  Follow-up care  Follow up with your healthcare provider or as advised by our staff.  When to seek medical advice  Call your healthcare provider if any of the following occur:  · Stomach pain gets worse or moves to the lower right abdomen (appendix area)  · Chest pain appears or gets worse, or spreads to the back, neck, shoulder, or arm  · Frequent vomiting (cant keep down liquids)  · Blood in the stool or vomit (red or black in color)  · Feeling weak or dizzy  · Fever of 100.4ºF (38ºC) or higher, or as directed by your healthcare provider  © 1245-2466 [employer]. [address] [place] [address], [place] [address]. All rights reserved. This information is not intended as a substitute for professional medical care. Always follow your healthcare professional's instructions.        Discharge Instructions: Eating a Soft Diet  You have been prescribed a soft diet (also called gastrointestinal soft diet or bland diet). This reduces the amount of work your digestive tract has to do. It also reduces the chance that your digestive tract will be irritated by the food you eat. A soft diet is prescribed for people with digestive problems. The diet consists of foods that are tender, mildly seasoned,  and easy to digest. While on this diet, you should not eat fried or spicy foods, or raw fruits and vegetables. Also avoid alcoholic beverages.  General guidelines  · Eat in a calm, relaxed atmosphere. How you eat may be as important as what you eat. Dont rush while eating. Chew your food slowly and thoroughly, and swallow slowly.  · Eat small frequent meals throughout the day, but dont eat within 2 hours of bedtime.  · Avoid any foods that cause discomfort.  · Dont use NSAIDs (nonsteroidal anti-inflammatory drugs), such as aspirin, and ibuprofen. Also avoid medicine that contain aspirin. NSAIDs can cause ulcers and delay or prevent ulcer healing.  · Use antacids as needed, but keep in mind that magnesium-containing antacids may cause diarrhea.  Foods to eat  · Cream of wheat and cream of rice  · Cooked white rice  · Mashed potatoes, and boiled potatoes without skin  · Plain pasta and noodles  · Plain white crackers (such as no-salt soda crackers)  · White bread  · Applesauce  · Cooked fruits without skins or seeds  · Mild juices, such as apple and grape  · Bananas  · Cooked or mashed vegetables without stems and seeds  ¨ Carrots  ¨ Summer squash (zucchini, yellow squash)  ¨ Winter squash (acorn, butternut, spaghetti squash)  · Cottage cheese  · Mild hard or soft cheeses  · Custard  · Yogurt without seeds or nuts  · Milk (you may need lactose-free milk)  · Ice cream without seeds or nuts  · Smooth peanut butter  · Eggs  · Fish, turkey, chicken, or other meat that is not tough or stringy  · Tofu  Foods to avoid  · Nuts and seeds  · Snack foods, such as the following:  ¨ Chocolate-containing snacks, candy, pastries, or cakes.  ¨ Potato chips (plain, barbecued, or other flavors)  ¨ Taco chips or nachos  ¨ Corn chips  ¨ Popcorn, popcorn cakes, or rice cakes  ¨ Crackers with nuts, seeds, or spicy seasonings  ¨ French fries  · Fried or greasy foods  · Whole-grain breads, rolls, and crackers  · Breads and rolls with  nuts, seeds, or bran  · Bran and granola cereals  · Berries with seeds, such as strawberries, raspberries, and blackberries  · Acidic fruits, such as oranges, grapefruits, miko, limes, and pineapples  · Raw vegetables  · Mild or hot peppers  · Sauerkraut and pickled vegetables  · Tomatoes or tomato products, such as tomato paste, tomato sauce, and tomato juice  · Barbecue sauce  · Spicy or flavored cheeses, such as jalapeño and black pepper cheese  · Crunchy peanut butter  · Dried cooked beans, such as bustamante, kidney, or navy beans  · The following meats:  ¨ Fried or greasy meats  ¨ Processed, spicy meats, such as sausage, campos, ham, and lunch meats  ¨ Ribs and other meats with barbecue sauce  ¨ Tough or stringy meats, such as corned beef or beef jerky  Fluids to avoid  · Alcoholic beverages  · Coffee and regular teas  · Jen and other drinks with caffeine  · Cranberry, orange, pineapple, and grapefruit juice  · Lemonade  · Vegetable juice  · Whole milk, if you are lactose intolerant  Follow-up  Make a follow-up appointment with a dietitian as directed by our staff.  © 2321-9916 [employer] [address] [place] [address], [place] [address]. All rights reserved. This information is not intended as a substitute for professional medical care. Always follow your healthcare professional's instructions.

## 2017-01-12 NOTE — MR AVS SNAPSHOT
[facility]  [address]  [facility] [address]  Phone: [phone]                  Carlos Mayberrywilverrohit   2017 10:00 AM   Office Visit    Description:  Female : 1990   Provider:  JENNIFER Gagnon   Department:  [facility]           Reason for Visit     Gastroesophageal Reflux     Dysphagia           Diagnoses this Visit        Comments    Gastroesophageal reflux disease with esophagitis    -  Primary     Heartburn         Pharyngoesophageal dysphagia         Nonspecific chest pain                To Do List           Goals (5 Years of Data)              3/31/16    11/30/15    11/18/15    BMI is less than 25   Not on track  Not on track  Not on track      Follow-Up and Disposition     Return in about 2 months (around 3/12/2017), or if symptoms worsen or fail to improve.       These Medications        Disp Refills Start End    pantoprazole (PROTONIX) 40 MG tablet 30 tablet 6 2017    Take 1 tablet (40 mg total) by mouth once daily. Before breakfast - Oral    Pharmacy: [facility] [address] [place] [address] [address] AT [facility] [address] & [employer] [address]  #: [phone]         [facility]     [facility] -  Assistance  Registered nurses in the [facility] provide clinical advisement, health education, appointment booking, and other advisory services.  Call for this free service at [phone].             Medications           Message regarding Medications     Verify the changes and/or additions to your medication regime listed below are the same as discussed with your clinician today.  If any of these changes or additions are incorrect, please notify your healthcare provider.        START taking these NEW medications        Refills    pantoprazole (PROTONIX) 40 MG tablet 6    Sig: Take 1 tablet (40 mg total) by mouth once daily. Before breakfast    Class: Normal    Route:  Oral      STOP taking these medications     ranitidine (ZANTAC) 300 MG tablet TAKE 1 TABLET BY MOUTH NIGHTLY           Verify that the below list of medications is an accurate representation of the medications you are currently taking.  If none reported, the list may be blank. If incorrect, please contact your healthcare provider. Carry this list with you in case of emergency.           Current Medications     albuterol (PROAIR HFA) 90 mcg/actuation inhaler INHALE 1 PUFF EVERY 4 HOURS PRN for Wheezing    albuterol (PROVENTIL) 2.5 mg /3 mL (0.083 %) nebulizer solution Take 3 mLs (2.5 mg total) by nebulization every 6 (six) hours as needed for Wheezing.    aripiprazole (ABILIFY) 5 MG Tab Take 5 mg by mouth once daily.    beclomethasone (QVAR) 80 mcg/actuation Aero Inhale 2 puffs into the lungs 2 (two) times daily.    clobetasol (TEMOVATE) 0.05 % cream Apply 1 application topically daily as needed.     FLONASE ALLERGY RELIEF 50 mcg/actuation nasal spray SHAKE WELL AND USE 2 SPRAYS IN EACH NOSTRIL EVERY DAY    inhalation device (BREATHERITE VALVED MDI SPACER) Use as directed for inhalation.    levothyroxine (SYNTHROID) 88 MCG tablet TAKE 1 TABLET BY MOUTH EVERY MORNING BEFORE BREAKFAST    lisdexamfetamine (VYVANSE) 40 MG Cap Take 40 mg by mouth once daily.    medroxyPROGESTERone (PROVERA) 10 MG tablet TAKE 1 TABLET(10 MG) BY MOUTH EVERY DAY    montelukast (SINGULAIR) 10 mg tablet Take 1 tablet (10 mg total) by mouth once daily.    pantoprazole (PROTONIX) 40 MG tablet Take 1 tablet (40 mg total) by mouth once daily. Before breakfast    prenatal vit#103-iron-FA () 27 mg iron- 1 mg Tab Take 1 tablet by mouth once daily.    sucralfate (CARAFATE) 1 gram tablet Take 1 tablet (1 g total) by mouth 4 (four) times daily.    trazodone (DESYREL) 100 MG tablet Take 1 tablet by mouth every evening.    trazodone (DESYREL) 50 MG tablet Take 50 mg by mouth nightly as needed for Insomnia.    triamcinolone acetonide 0.1% (KENALOG)  0.1 % ointment AAA bid x 2 weeks then prn, avoid chronic use           Clinical Reference Information           Vital Signs - Last Recorded  Most recent update: 1/12/2017 10:03 AM by Navdeep Wiggins LPN    BP Pulse Resp Ht Wt LMP    110/76 64 18 5' (1.524 m) 77.2 kg (170 lb 3.1 oz) 01/07/2017 (Exact Date)    BMI                33.24 kg/m2          Blood Pressure          Most Recent Value    BP  110/76      Allergies as of 1/12/2017     Grapefruit    Pineapple    Augmentin [Amoxicillin-pot Clavulanate]    Penicillins    Tamiflu [Oseltamivir]      Immunizations Administered on Date of Encounter - 1/12/2017     None      Orders Placed During Today's Visit     Future Labs/Procedures Expected by Expires    FL Upper GI Air Contrast With KUB  1/12/2017 1/12/2018      Instructions      GERD (Adult)    The esophagus is a tube that carries food from the mouth to the stomach. A valve at the lower end of the esophagus prevents stomach acid from flowing upward. When this valve doesn't work properly, stomach contents may repeatedly flow back up (reflux) into the esophagus. This is called gastroesophageal reflux disease (GERD). GERD can irritate the esophagus. It can cause problems with swallowing or breathing. In severe cases, GERD can cause recurrent pneumonia or other serious problems.  Symptoms of reflux include burning, pressure or sharp pain in the upper abdomen or mid to lower chest. The pain can spread to the neck, back, or shoulder. There may be belching, an acid taste in the back of the throat, chronic cough, or sore throat or hoarseness. GERD symptoms often occur during the day after a big meal. They can also occur at night when lying down.   Home care  Lifestyle changes can help reduce symptoms. If needed, medicines may be prescribed. Symptoms often improve with treatment, but if treatment is stopped, the symptoms often return after a few months. So most persons with GERD will need to continue  "treatment.  Lifestyle changes  · Limit or avoid fatty, fried, and spicy foods, as well as coffee, chocolate, mint, and foods with high acid content such as tomatoes and citrus fruit and juices (orange, grapefruit, lemon).  · Dont eat large meals, especially at night. Frequent, smaller meals are best. Do not lie down right after eating. And dont eat anything 3 hours before going to bed.  · Avoid drinking alcohol and smoking. As much as possible, stay away from second hand smoke.  · If you are overweight, losing weight will reduce symptoms.   · Avoid wearing tight clothing around your stomach area.  · If your symptoms occur during sleep, use a foam wedge to elevate your upper body (not just your head.) Or, place 4" blocks under the head of your bed.  Medicines  If needed, medicines can help relieve the symptoms of GERD and prevent damage to the esophagus. Discuss a medicine plan with your healthcare provider. This may include one or more of the following medicines:  · Antacids to help neutralize the normal acids in your stomach.  · Acid blockers (H2 blockers) to decrease acid production.  · Acid inhibitors (PPIs) to decrease acid production in a different way than the blockers. They may work better, but can take a little longer to take effect.  Take an antacid 30-60 minutes after eating and at bedtime, but not at the same time as an acid blocker.  Try not to take medicines such as ibuprofen and aspirin. If you are taking aspirin for your heart or other medical reasons, talk to your healthcare provider about stopping it.  Follow-up care  Follow up with your healthcare provider or as advised by our staff.  When to seek medical advice  Call your healthcare provider if any of the following occur:  · Stomach pain gets worse or moves to the lower right abdomen (appendix area)  · Chest pain appears or gets worse, or spreads to the back, neck, shoulder, or arm  · Frequent vomiting (cant keep down liquids)  · Blood in the " stool or vomit (red or black in color)  · Feeling weak or dizzy  · Fever of 100.4ºF (38ºC) or higher, or as directed by your healthcare provider  © 3708-2844 [employer]. [address] [place] [address], [place] [address]. All rights reserved. This information is not intended as a substitute for professional medical care. Always follow your healthcare professional's instructions.        Discharge Instructions: Eating a Soft Diet  You have been prescribed a soft diet (also called gastrointestinal soft diet or bland diet). This reduces the amount of work your digestive tract has to do. It also reduces the chance that your digestive tract will be irritated by the food you eat. A soft diet is prescribed for people with digestive problems. The diet consists of foods that are tender, mildly seasoned, and easy to digest. While on this diet, you should not eat fried or spicy foods, or raw fruits and vegetables. Also avoid alcoholic beverages.  General guidelines  · Eat in a calm, relaxed atmosphere. How you eat may be as important as what you eat. Dont rush while eating. Chew your food slowly and thoroughly, and swallow slowly.  · Eat small frequent meals throughout the day, but dont eat within 2 hours of bedtime.  · Avoid any foods that cause discomfort.  · Dont use NSAIDs (nonsteroidal anti-inflammatory drugs), such as aspirin, and ibuprofen. Also avoid medicine that contain aspirin. NSAIDs can cause ulcers and delay or prevent ulcer healing.  · Use antacids as needed, but keep in mind that magnesium-containing antacids may cause diarrhea.  Foods to eat  · Cream of wheat and cream of rice  · Cooked white rice  · Mashed potatoes, and boiled potatoes without skin  · Plain pasta and noodles  · Plain white crackers (such as no-salt soda crackers)  · White bread  · Applesauce  · Cooked fruits without skins or seeds  · Mild juices, such as apple and grape  · Bananas  · Cooked or mashed vegetables without stems and  seeds  ¨ Carrots  ¨ Summer squash (zucchini, yellow squash)  ¨ Winter squash (acorn, butternut, spaghetti squash)  · Cottage cheese  · Mild hard or soft cheeses  · Custard  · Yogurt without seeds or nuts  · Milk (you may need lactose-free milk)  · Ice cream without seeds or nuts  · Smooth peanut butter  · Eggs  · Fish, turkey, chicken, or other meat that is not tough or stringy  · Tofu  Foods to avoid  · Nuts and seeds  · Snack foods, such as the following:  ¨ Chocolate-containing snacks, candy, pastries, or cakes.  ¨ Potato chips (plain, barbecued, or other flavors)  ¨ Taco chips or nachos  ¨ Corn chips  ¨ Popcorn, popcorn cakes, or rice cakes  ¨ Crackers with nuts, seeds, or spicy seasonings  ¨ French fries  · Fried or greasy foods  · Whole-grain breads, rolls, and crackers  · Breads and rolls with nuts, seeds, or bran  · Bran and granola cereals  · Berries with seeds, such as strawberries, raspberries, and blackberries  · Acidic fruits, such as oranges, grapefruits, miko, limes, and pineapples  · Raw vegetables  · Mild or hot peppers  · Sauerkraut and pickled vegetables  · Tomatoes or tomato products, such as tomato paste, tomato sauce, and tomato juice  · Barbecue sauce  · Spicy or flavored cheeses, such as jalapeño and black pepper cheese  · Crunchy peanut butter  · Dried cooked beans, such as bustamante, kidney, or navy beans  · The following meats:  ¨ Fried or greasy meats  ¨ Processed, spicy meats, such as sausage, campos, ham, and lunch meats  ¨ Ribs and other meats with barbecue sauce  ¨ Tough or stringy meats, such as corned beef or beef jerky  Fluids to avoid  · Alcoholic beverages  · Coffee and regular teas  · Jen and other drinks with caffeine  · Cranberry, orange, pineapple, and grapefruit juice  · Lemonade  · Vegetable juice  · Whole milk, if you are lactose intolerant  Follow-up  Make a follow-up appointment with a dietitian as directed by our staff.  © 4853-6454 [employer] [address]  [place] [address]. All rights reserved. This information is not intended as a substitute for professional medical care. Always follow your healthcare professional's instructions.

## 2017-01-13 ENCOUNTER — ANESTHESIA EVENT (OUTPATIENT)
Dept: ENDOSCOPY | Facility: HOSPITAL | Age: 27
End: 2017-01-13
Payer: MEDICAID

## 2017-01-13 ENCOUNTER — SURGERY (OUTPATIENT)
Age: 27
End: 2017-01-13

## 2017-01-13 ENCOUNTER — TELEPHONE (OUTPATIENT)
Dept: ALLERGY | Facility: CLINIC | Age: 27
End: 2017-01-13

## 2017-01-13 ENCOUNTER — ANESTHESIA (OUTPATIENT)
Dept: ENDOSCOPY | Facility: HOSPITAL | Age: 27
End: 2017-01-13
Payer: MEDICAID

## 2017-01-13 ENCOUNTER — TELEPHONE (OUTPATIENT)
Dept: GASTROENTEROLOGY | Facility: CLINIC | Age: 27
End: 2017-01-13

## 2017-01-13 ENCOUNTER — HOSPITAL ENCOUNTER (OUTPATIENT)
Facility: HOSPITAL | Age: 27
Discharge: HOME | End: 2017-01-13
Attending: INTERNAL MEDICINE | Admitting: INTERNAL MEDICINE
Payer: MEDICAID

## 2017-01-13 VITALS
WEIGHT: 170 LBS | HEIGHT: 60 IN | DIASTOLIC BLOOD PRESSURE: 70 MMHG | BODY MASS INDEX: 33.38 KG/M2 | RESPIRATION RATE: 18 BRPM | OXYGEN SATURATION: 100 % | SYSTOLIC BLOOD PRESSURE: 114 MMHG | TEMPERATURE: 98 F | HEART RATE: 66 BPM

## 2017-01-13 VITALS — RESPIRATION RATE: 28 BRPM

## 2017-01-13 DIAGNOSIS — K21.9 GERD (GASTROESOPHAGEAL REFLUX DISEASE): ICD-10-CM

## 2017-01-13 LAB
B-HCG UR QL: NEGATIVE
CTP QC/QA: YES

## 2017-01-13 PROCEDURE — D9220A PRA ANESTHESIA: Mod: ANES,,, | Performed by: ANESTHESIOLOGY

## 2017-01-13 PROCEDURE — 43239 EGD BIOPSY SINGLE/MULTIPLE: CPT | Mod: 59,,, | Performed by: INTERNAL MEDICINE

## 2017-01-13 PROCEDURE — 37000009 HC ANESTHESIA EA ADD 15 MINS: Mod: PO | Performed by: INTERNAL MEDICINE

## 2017-01-13 PROCEDURE — 88305 TISSUE EXAM BY PATHOLOGIST: CPT | Mod: 26,,, | Performed by: PATHOLOGY

## 2017-01-13 PROCEDURE — D9220A PRA ANESTHESIA: Mod: CRNA,,, | Performed by: NURSE ANESTHETIST, CERTIFIED REGISTERED

## 2017-01-13 PROCEDURE — 43248 EGD GUIDE WIRE INSERTION: CPT | Mod: PO | Performed by: INTERNAL MEDICINE

## 2017-01-13 PROCEDURE — 81025 URINE PREGNANCY TEST: CPT | Mod: PO | Performed by: INTERNAL MEDICINE

## 2017-01-13 PROCEDURE — 43239 EGD BIOPSY SINGLE/MULTIPLE: CPT | Mod: PO | Performed by: INTERNAL MEDICINE

## 2017-01-13 PROCEDURE — 43248 EGD GUIDE WIRE INSERTION: CPT | Mod: ,,, | Performed by: INTERNAL MEDICINE

## 2017-01-13 PROCEDURE — 25000003 PHARM REV CODE 250: Mod: PO | Performed by: INTERNAL MEDICINE

## 2017-01-13 PROCEDURE — 88312 SPECIAL STAINS GROUP 1: CPT | Mod: 26,,, | Performed by: PATHOLOGY

## 2017-01-13 PROCEDURE — 25000003 PHARM REV CODE 250: Mod: PO | Performed by: NURSE ANESTHETIST, CERTIFIED REGISTERED

## 2017-01-13 PROCEDURE — 63600175 PHARM REV CODE 636 W HCPCS: Mod: PO | Performed by: NURSE ANESTHETIST, CERTIFIED REGISTERED

## 2017-01-13 PROCEDURE — 88305 TISSUE EXAM BY PATHOLOGIST: CPT | Performed by: PATHOLOGY

## 2017-01-13 PROCEDURE — 27201012 HC FORCEPS, HOT/COLD, DISP: Mod: PO | Performed by: INTERNAL MEDICINE

## 2017-01-13 PROCEDURE — 37000008 HC ANESTHESIA 1ST 15 MINUTES: Mod: PO | Performed by: INTERNAL MEDICINE

## 2017-01-13 RX ORDER — LIDOCAINE HYDROCHLORIDE 10 MG/ML
1 INJECTION INFILTRATION; PERINEURAL ONCE
Status: COMPLETED | OUTPATIENT
Start: 2017-01-13 | End: 2017-01-13

## 2017-01-13 RX ORDER — PROPOFOL 10 MG/ML
VIAL (ML) INTRAVENOUS
Status: DISCONTINUED | OUTPATIENT
Start: 2017-01-13 | End: 2017-01-13

## 2017-01-13 RX ORDER — SODIUM CHLORIDE, SODIUM LACTATE, POTASSIUM CHLORIDE, CALCIUM CHLORIDE 600; 310; 30; 20 MG/100ML; MG/100ML; MG/100ML; MG/100ML
INJECTION, SOLUTION INTRAVENOUS CONTINUOUS
Status: DISCONTINUED | OUTPATIENT
Start: 2017-01-13 | End: 2017-01-13 | Stop reason: HOSPADM

## 2017-01-13 RX ORDER — SODIUM CHLORIDE 0.9 % (FLUSH) 0.9 %
3 SYRINGE (ML) INJECTION
Status: CANCELLED | OUTPATIENT
Start: 2017-01-13

## 2017-01-13 RX ORDER — LIDOCAINE HCL/PF 100 MG/5ML
SYRINGE (ML) INTRAVENOUS
Status: DISCONTINUED | OUTPATIENT
Start: 2017-01-13 | End: 2017-01-13

## 2017-01-13 RX ADMIN — PROPOFOL 30 MG: 10 INJECTION, EMULSION INTRAVENOUS at 11:01

## 2017-01-13 RX ADMIN — LIDOCAINE HYDROCHLORIDE 100 MG: 20 INJECTION, SOLUTION INTRAVENOUS at 11:01

## 2017-01-13 RX ADMIN — PROPOFOL 100 MG: 10 INJECTION, EMULSION INTRAVENOUS at 11:01

## 2017-01-13 RX ADMIN — SODIUM CHLORIDE, SODIUM LACTATE, POTASSIUM CHLORIDE, AND CALCIUM CHLORIDE: .6; .31; .03; .02 INJECTION, SOLUTION INTRAVENOUS at 10:01

## 2017-01-13 RX ADMIN — LIDOCAINE HYDROCHLORIDE: 10 INJECTION, SOLUTION EPIDURAL; INFILTRATION; INTRACAUDAL; PERINEURAL at 10:01

## 2017-01-13 RX ADMIN — PROPOFOL 40 MG: 10 INJECTION, EMULSION INTRAVENOUS at 11:01

## 2017-01-13 RX ADMIN — LIDOCAINE HYDROCHLORIDE 75 MG: 20 INJECTION, SOLUTION INTRAVENOUS at 11:01

## 2017-01-13 NOTE — TRANSFER OF CARE
Anesthesia Transfer of Care Note    Patient: Carlos Coulter    Procedure(s) Performed: Procedure(s) (LRB):  ESOPHAGOGASTRODUODENOSCOPY (EGD) (N/A)    Patient location: PACU    Anesthesia Type: general    Transport from OR: Transported from OR on room air with adequate spontaneous ventilation    Post pain: adequate analgesia    Post assessment: no apparent anesthetic complications and tolerated procedure well    Post vital signs: stable    Level of consciousness: awake    Nausea/Vomiting: no nausea/vomiting    Complications: none          Last vitals:   Visit Vitals    /71    Pulse 62    Temp 36.6 °C (97.9 °F)    Resp 18    Ht 5' (1.524 m)    Wt 77.1 kg (170 lb)    LMP 01/07/2017 (Exact Date)    SpO2 98%    Breastfeeding No    BMI 33.2 kg/m2

## 2017-01-13 NOTE — ANESTHESIA PREPROCEDURE EVALUATION
01/13/2017  Carlos Coulter is a 26 y.o., female.    [facility] Anesthesia Evaluation      I have reviewed the Medications.     Review of Systems  Anesthesia Hx:  Denies Hx of Anesthetic complications   Social:  Former smoker, quit 2014   EENT/Dental:   chronic allergic rhinitis   Cardiovascular:   Exercise tolerance: good    Pulmonary:   Asthma mild    Hepatic/GI:   GERD    Endocrine:   Hypothyroidism    Psych:   Psychiatric History anxiety depression Bipolar d/o, on abilify  ADHD, on vyvanse  Learning/speech disability         Physical Exam  General:  Well nourished    Airway/Jaw/Neck:  Airway Findings: Mouth Opening: Normal Tongue: Normal  General Airway Assessment: Adult, Average  Mallampati: II  Jaw/Neck Findings:  Neck ROM: Normal ROM       Chest/Lungs:  Chest/Lungs Findings: Clear to auscultation, Normal Respiratory Rate     Heart/Vascular:  Heart Findings: Rate: Normal  Rhythm: Regular Rhythm  Sounds: Normal  Heart murmur: negative       Mental Status:  Mental Status Findings:  Cooperative, Alert and Oriented         Anesthesia Plan  Type of Anesthesia, risks & benefits discussed:  Anesthesia Type:  general  Patient's Preference:   Intra-op Monitoring Plan:   Intra-op Monitoring Plan Comments:   Post Op Pain Control Plan:   Post Op Pain Control Plan Comments:   Induction:   IV  Beta Blocker:  Patient is not currently on a Beta-Blocker (No further documentation required).       Informed Consent: Patient understands risks and agrees with Anesthesia plan.  Questions answered. Anesthesia consent signed with patient.  ASA Score: 3     Day of Surgery Review of History & Physical:        Anesthesia Plan Notes: Propofol general.        Ready For Surgery From Anesthesia Perspective.

## 2017-01-13 NOTE — IP AVS SNAPSHOT
[facility]  [address] [place] [address]  Phone: [phone]           I have received a copy of my After Visit Summary and discharge instructions from [facility].    INSTRUCTIONS RECEIVED AND UNDERSTOOD BY:                     Patient/Patient Representative: ________________________________________________________________     Date/Time: ________________________________________________________________                     Instructions Given By: ________________________________________________________________     Date/Time: ________________________________________________________________

## 2017-01-13 NOTE — H&P
History & Physical - Short Stay  Gastroenterology      SUBJECTIVE:     Procedure: EGD    Chief Complaint/Indication for Procedure: Dysphagia and Reflux    PTA Medications   Medication Sig    albuterol (PROAIR HFA) 90 mcg/actuation inhaler INHALE 1 PUFF EVERY 4 HOURS PRN for Wheezing    albuterol (PROVENTIL) 2.5 mg /3 mL (0.083 %) nebulizer solution Take 3 mLs (2.5 mg total) by nebulization every 6 (six) hours as needed for Wheezing.    aripiprazole (ABILIFY) 5 MG Tab Take 5 mg by mouth once daily.    beclomethasone (QVAR) 80 mcg/actuation Aero Inhale 2 puffs into the lungs 2 (two) times daily.    clobetasol (TEMOVATE) 0.05 % cream Apply 1 application topically daily as needed.     FLONASE ALLERGY RELIEF 50 mcg/actuation nasal spray SHAKE WELL AND USE 2 SPRAYS IN EACH NOSTRIL EVERY DAY    inhalation device (BREATHERITE VALVED MDI SPACER) Use as directed for inhalation.    levothyroxine (SYNTHROID) 88 MCG tablet TAKE 1 TABLET BY MOUTH EVERY MORNING BEFORE BREAKFAST    medroxyPROGESTERone (PROVERA) 10 MG tablet TAKE 1 TABLET(10 MG) BY MOUTH EVERY DAY    montelukast (SINGULAIR) 10 mg tablet Take 1 tablet (10 mg total) by mouth once daily.    pantoprazole (PROTONIX) 40 MG tablet Take 1 tablet (40 mg total) by mouth once daily. Before breakfast    prenatal vit#103-iron-FA () 27 mg iron- 1 mg Tab Take 1 tablet by mouth once daily.    sucralfate (CARAFATE) 1 gram tablet Take 1 tablet (1 g total) by mouth 4 (four) times daily.    trazodone (DESYREL) 100 MG tablet Take 1 tablet by mouth every evening.    trazodone (DESYREL) 50 MG tablet Take 50 mg by mouth nightly as needed for Insomnia.    triamcinolone acetonide 0.1% (KENALOG) 0.1 % ointment AAA bid x 2 weeks then prn, avoid chronic use    lisdexamfetamine (VYVANSE) 40 MG Cap Take 40 mg by mouth once daily.       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 3/2015     L foot    Foot surgery Right 10/2014     Right foot surgery    Muscle release Bilateral      Lower extremities due to congenital club feet    Multiple tooth extractions      Knee surgery  right/2015    Upper gastrointestinal endoscopy  2016     Dr. Nguyen     Family History   Problem Relation Age of Onset    Diabetes Mother     Heart disease Mother     Thyroid disease Mother     Depression Mother     Asthma Mother     Arthritis Mother     Allergic rhinitis Mother     Colon polyps Mother     Depression Father     Migraines Father     Thyroid disease Father     PAVEL disease Father     Early death Brother       1 hour after birth    Thyroid disease Maternal Grandmother     Seizures Maternal Grandmother     Clotting disorder Maternal Grandmother     Pulmonary fibrosis Maternal Grandfather     Colon cancer Maternal Grandfather     Diabetes Paternal Grandmother     Arthritis Paternal Grandmother     Schizophrenia  Paternal Grandmother     Depression Paternal Grandmother     Early death Paternal Grandfather 54    Aneurysm Paternal Grandfather     Angioedema Neg Hx     Immunodeficiency Neg Hx     Urticaria Neg Hx     Collagen disease Neg Hx      Social History   Substance Use Topics    Smoking status: Former Smoker     Quit date: 11/20/2014    Smokeless tobacco: Never Used    Alcohol use No         OBJECTIVE:     Vital Signs (Most Recent)  Temp: 97.9 °F (36.6 °C) (01/13/17 1037)  Pulse: 62 (01/13/17 1037)  Resp: 18 (01/13/17 1037)  BP: 111/71 (01/13/17 1037)  SpO2: 98 % (01/13/17 1037)    Physical Exam:                                                       GENERAL:  Comfortable, in no acute distress.                                 HEENT EXAM:  Nonicteric.  No adenopathy.  Oropharynx is clear.               NECK:  Supple.                                                               LUNGS:  Clear.                                                               CARDIAC:  Regular rate and rhythm.  S1, S2.  No murmur.                      ABDOMEN:  Soft, positive bowel sounds, nontender.  No hepatosplenomegaly or masses.  No rebound or guarding.                                             EXTREMITIES:  No edema.     MENTAL STATUS:  Normal, alert and oriented.      ASSESSMENT/PLAN:     Assessment: Dysphagia and Reflux    Plan: EGD    Anesthesia Plan: General    ASA Grade: ASA 2 - Patient with mild systemic disease with no functional limitations    MALLAMPATI SCORE:  I (soft palate, uvula, fauces, and tonsillar pillars visible)

## 2017-01-13 NOTE — ANESTHESIA POSTPROCEDURE EVALUATION
Anesthesia Post Evaluation    Patient: Carlos Coulter    Procedure(s) Performed: Procedure(s) (LRB):  ESOPHAGOGASTRODUODENOSCOPY (EGD) (N/A)    Final Anesthesia Type: general  Patient location during evaluation: PACU  Patient participation: Yes- Able to Participate  Level of consciousness: awake and alert  Post-procedure vital signs: reviewed and stable  Pain management: adequate  Airway patency: patent  PONV status at discharge: No PONV  Anesthetic complications: no      Cardiovascular status: hemodynamically stable and blood pressure returned to baseline  Respiratory status: unassisted, spontaneous ventilation and room air  Hydration status: euvolemic  Follow-up not needed.        Visit Vitals    /70    Pulse 66    Temp 36.4 °C (97.5 °F) (Skin)    Resp 18    Ht 5' (1.524 m)    Wt 77.1 kg (170 lb)    LMP 01/07/2017 (Exact Date)    SpO2 100%    Breastfeeding No    BMI 33.2 kg/m2       Pain/Nae Score: Pain Assessment Performed: Yes (1/13/2017 11:40 AM)  Presence of Pain: denies (1/13/2017 12:00 PM)  Nae Score: 10 (1/13/2017 12:00 PM)

## 2017-01-13 NOTE — DISCHARGE SUMMARY
Discharge Note  Short Stay      SUMMARY     Admit Date: 1/13/2017    Attending Physician: Rubio Nguyen MD     Discharge Physician: Rubio Nguyen MD    Discharge Date: 1/13/2017 11:37 AM    Final Diagnosis: Esophageal pain [K22.8]  Gastroesophageal reflux disease, esophagitis presence not specified [K21.9]    Disposition: HOME OR SELF CARE    Patient Instructions:   Current Discharge Medication List      CONTINUE these medications which have NOT CHANGED    Details   albuterol (PROAIR HFA) 90 mcg/actuation inhaler INHALE 1 PUFF EVERY 4 HOURS PRN for Wheezing  Qty: 8.5 g, Refills: 2      albuterol (PROVENTIL) 2.5 mg /3 mL (0.083 %) nebulizer solution Take 3 mLs (2.5 mg total) by nebulization every 6 (six) hours as needed for Wheezing.  Qty: 1 Box, Refills: 3      aripiprazole (ABILIFY) 5 MG Tab Take 5 mg by mouth once daily.      beclomethasone (QVAR) 80 mcg/actuation Aero Inhale 2 puffs into the lungs 2 (two) times daily.  Qty: 1 each, Refills: 12      clobetasol (TEMOVATE) 0.05 % cream Apply 1 application topically daily as needed.       FLONASE ALLERGY RELIEF 50 mcg/actuation nasal spray SHAKE WELL AND USE 2 SPRAYS IN EACH NOSTRIL EVERY DAY  Qty: 1 Bottle, Refills: 11      inhalation device (BREATHERITE VALVED MDI SPACER) Use as directed for inhalation.  Qty: 1 Device, Refills: 0      levothyroxine (SYNTHROID) 88 MCG tablet TAKE 1 TABLET BY MOUTH EVERY MORNING BEFORE BREAKFAST  Qty: 90 tablet, Refills: 1      medroxyPROGESTERone (PROVERA) 10 MG tablet TAKE 1 TABLET(10 MG) BY MOUTH EVERY DAY  Qty: 10 tablet, Refills: 0    Associated Diagnoses: Irregular periods      montelukast (SINGULAIR) 10 mg tablet Take 1 tablet (10 mg total) by mouth once daily.  Qty: 90 tablet, Refills: 3      pantoprazole (PROTONIX) 40 MG tablet Take 1 tablet (40 mg total) by mouth once daily. Before breakfast  Qty: 30 tablet, Refills: 6    Associated Diagnoses: Gastroesophageal reflux disease with esophagitis; Heartburn;  Pharyngoesophageal dysphagia; Nonspecific chest pain      prenatal vit#103-iron-FA () 27 mg iron- 1 mg Tab Take 1 tablet by mouth once daily.  Qty: 30 tablet, Refills: 11    Associated Diagnoses: Irregular periods      sucralfate (CARAFATE) 1 gram tablet Take 1 tablet (1 g total) by mouth 4 (four) times daily.  Qty: 40 tablet, Refills: 0      !! trazodone (DESYREL) 100 MG tablet Take 1 tablet by mouth every evening.  Refills: 0      !! trazodone (DESYREL) 50 MG tablet Take 50 mg by mouth nightly as needed for Insomnia.      triamcinolone acetonide 0.1% (KENALOG) 0.1 % ointment AAA bid x 2 weeks then prn, avoid chronic use  Qty: 454 g, Refills: 1    Associated Diagnoses: Atopic dermatitis      lisdexamfetamine (VYVANSE) 40 MG Cap Take 40 mg by mouth once daily.       !! - Potential duplicate medications found. Please discuss with provider.          Discharge Procedure Orders (must include Diet, Follow-up, Activity)    Follow Up:  Follow up with PCP as previously scheduled  Resume routine diet.  Activity as tolerated.    No driving day of procedure.

## 2017-01-13 NOTE — DISCHARGE INSTRUCTIONS
Procedural Sedation (Adult)  You have been given medicine by vein to make you sleep during your surgery. This may have included both a pain medicine and sleeping medicine. Most of the effects have worn off. But you may still have some drowsiness for the next 6 to 8 hours.  Home care  Follow these guidelines when you get home:  · For the next 8 hours, you should be watched by a responsible adult. This person should make sure your condition is not getting worse.  · Don't take any medicine by mouth for pain or for sleep during the next 4 hours. These might react with the medicines you were given in the hospital. This could cause a much stronger response than usual.  · Don't drink any alcohol for the next 24 hours.  · Don't drive, operate dangerous machinery, or make important business or personal decisions during the next 24 hours.  Follow-up care  Follow up with your healthcare provider if you are not alert and back to your usual level of activity within 12 hours.  When to seek medical advice  Call your healthcare provider right away if any of these occur:  · Drowsiness gets worse  · Weakness or dizziness gets worse  · Repeated vomiting  · You cannot be awakened   © 6857-0912 [employer]. [address] [place] [address], [place] [address]. All rights reserved. This information is not intended as a substitute for professional medical care. Always follow your healthcare professional's instructions.

## 2017-01-13 NOTE — IP AVS SNAPSHOT
[facility]  [address] [facility]  [place] [address]  Phone: [phone]           Patient Discharge Instructions     Our goal is to set you up for success. This packet includes information on your condition, medications, and your home care. It will help you to care for yourself so you don't get sicker and need to go back to the hospital.     Please ask your nurse if you have any questions.        There are many details to remember when preparing to leave the hospital. Here is what you will need to do:    1. Take your medicine. If you are prescribed medications, review your Medication List in the following pages. You may have new medications to  at the pharmacy and others that you'll need to stop taking. Review the instructions for how and when to take your medications. Talk with your doctor or nurses if you are unsure of what to do.     2. Go to your follow-up appointments. Specific follow-up information is listed in the following pages. Your may be contacted by a transition nurse or clinical provider about future appointments. Be sure we have all of the phone numbers to reach you, if needed. Please contact your provider's office if you are unable to make an appointment.     3. Watch for warning signs. Your doctor or nurse will give you detailed warning signs to watch for and when to call for assistance. These instructions may also include educational information about your condition. If you experience any of warning signs to your health, call your doctor.               [facility]  Unless otherwise directed by your provider, please contact [facility], our nurse care line that is available for 24/7 assistance.     [phone] (toll-free)    Registered nurses in the [facility] provide clinical advisement, health education, appointment booking, and other advisory services.                    ** Verify the list of medication(s) below is accurate and up  to date. Carry this with you in case of emergency. If your medications have changed, please notify your healthcare provider.             Medication List      CONTINUE taking these medications        Additional Info                      ABILIFY 5 MG Tab   Refills:  0   Dose:  5 mg   Generic drug:  aripiprazole    Instructions:  Take 5 mg by mouth once daily.     Begin Date    AM    Noon    PM    Bedtime       * albuterol 2.5 mg /3 mL (0.083 %) nebulizer solution   Commonly known as:  PROVENTIL   Quantity:  1 Box   Refills:  3   Dose:  2.5 mg    Instructions:  Take 3 mLs (2.5 mg total) by nebulization every 6 (six) hours as needed for Wheezing.     Begin Date    AM    Noon    PM    Bedtime       * albuterol 90 mcg/actuation inhaler   Commonly known as:  PROAIR HFA   Quantity:  8.5 g   Refills:  2    Instructions:  INHALE 1 PUFF EVERY 4 HOURS PRN for Wheezing     Begin Date    AM    Noon    PM    Bedtime       beclomethasone 80 mcg/actuation Aero   Commonly known as:  QVAR   Quantity:  1 each   Refills:  12   Dose:  2 puff    Instructions:  Inhale 2 puffs into the lungs 2 (two) times daily.     Begin Date    AM    Noon    PM    Bedtime       clobetasol 0.05 % cream   Commonly known as:  TEMOVATE   Refills:  0   Dose:  1 application    Instructions:  Apply 1 application topically daily as needed.     Begin Date    AM    Noon    PM    Bedtime       FLONASE ALLERGY RELIEF 50 mcg/actuation nasal spray   Quantity:  1 Bottle   Refills:  11   Generic drug:  fluticasone    Instructions:  SHAKE WELL AND USE 2 SPRAYS IN EACH NOSTRIL EVERY DAY     Begin Date    AM    Noon    PM    Bedtime       inhalation device   Commonly known as:  BREATHERITE VALVED MDI SPACER   Quantity:  1 Device   Refills:  0    Instructions:  Use as directed for inhalation.     Begin Date    AM    Noon    PM    Bedtime       levothyroxine 88 MCG tablet   Commonly known as:  SYNTHROID   Quantity:  90 tablet   Refills:  1    Instructions:  TAKE 1 TABLET BY  MOUTH EVERY MORNING BEFORE BREAKFAST     Begin Date    AM    Noon    PM    Bedtime       lisdexamfetamine 40 MG Cap   Commonly known as:  VYVANSE   Refills:  0   Dose:  40 mg    Instructions:  Take 40 mg by mouth once daily.     Begin Date    AM    Noon    PM    Bedtime       medroxyPROGESTERone 10 MG tablet   Commonly known as:  PROVERA   Quantity:  10 tablet   Refills:  0    Instructions:  TAKE 1 TABLET(10 MG) BY MOUTH EVERY DAY     Begin Date    AM    Noon    PM    Bedtime       montelukast 10 mg tablet   Commonly known as:  SINGULAIR   Quantity:  90 tablet   Refills:  3   Dose:  10 mg    Instructions:  Take 1 tablet (10 mg total) by mouth once daily.     Begin Date    AM    Noon    PM    Bedtime       pantoprazole 40 MG tablet   Commonly known as:  PROTONIX   Quantity:  30 tablet   Refills:  6   Dose:  40 mg    Instructions:  Take 1 tablet (40 mg total) by mouth once daily. Before breakfast     Begin Date    AM    Noon    PM    Bedtime       prenatal vit#103-iron-FA 27 mg iron- 1 mg Tab   Commonly known as:     Quantity:  30 tablet   Refills:  11   Dose:  1 tablet    Instructions:  Take 1 tablet by mouth once daily.     Begin Date    AM    Noon    PM    Bedtime       sucralfate 1 gram tablet   Commonly known as:  CARAFATE   Quantity:  40 tablet   Refills:  0   Dose:  1 g    Instructions:  Take 1 tablet (1 g total) by mouth 4 (four) times daily.     Begin Date    AM    Noon    PM    Bedtime       * trazodone 50 MG tablet   Commonly known as:  DESYREL   Refills:  0   Dose:  50 mg    Instructions:  Take 50 mg by mouth nightly as needed for Insomnia.     Begin Date    AM    Noon    PM    Bedtime       * trazodone 100 MG tablet   Commonly known as:  DESYREL   Refills:  0   Dose:  1 tablet    Instructions:  Take 1 tablet by mouth every evening.     Begin Date    AM    Noon    PM    Bedtime       triamcinolone acetonide 0.1% 0.1 % ointment   Commonly known as:  KENALOG   Quantity:  454 g   Refills:  1     Instructions:  AAA bid x 2 weeks then prn, avoid chronic use     Begin Date    AM    Noon    PM    Bedtime       * Notice:  This list has 4 medication(s) that are the same as other medications prescribed for you. Read the directions carefully, and ask your doctor or other care provider to review them with you.               Please bring to all follow up appointments:    1. A copy of your discharge instructions.  2. All medicines you are currently taking in their original bottles.  3. Identification and insurance card.    Please arrive 15 minutes ahead of scheduled appointment time.    Please call 24 hours in advance if you must reschedule your appointment and/or time.        Your Scheduled Appointments     Feb 07, 2017 10:30 AM CST   [facility] with [facility] [id number]   [facility]    [address] [facility] [place] [address] [phone]              Follow-up Information     Follow up with Fanta Schuster MD.    Specialty:  Family Medicine    Contact information:    [address] [place] [address] [phone]            Discharge Instructions         Procedural Sedation (Adult)  You have been given medicine by vein to make you sleep during your surgery. This may have included both a pain medicine and sleeping medicine. Most of the effects have worn off. But you may still have some drowsiness for the next 6 to 8 hours.  Home care  Follow these guidelines when you get home:  · For the next 8 hours, you should be watched by a responsible adult. This person should make sure your condition is not getting worse.  · Don't take any medicine by mouth for pain or for sleep during the next 4 hours. These might react with the medicines you were given in the hospital. This could cause a much stronger response than usual.  · Don't drink any alcohol for the next 24 hours.  · Don't drive, operate dangerous machinery, or make important business or personal decisions during the next 24  hours.  Follow-up care  Follow up with your healthcare provider if you are not alert and back to your usual level of activity within 12 hours.  When to seek medical advice  Call your healthcare provider right away if any of these occur:  · Drowsiness gets worse  · Weakness or dizziness gets worse  · Repeated vomiting  · You cannot be awakened   © 20001216-1955 [employer]. [address] [place] [address]. All rights reserved. This information is not intended as a substitute for professional medical care. Always follow your healthcare professional's instructions.            Admission Information     Date & Time Provider Department CSN    1/13/2017  9:06 AM Rubio Nguyen MD [facility] [id number]      Care Providers     Provider Role Specialty Primary office phone    Rubio Nguyen MD Attending Provider Gastroenterology [phone]    Rubio Nguyen MD Surgeon  Gastroenterology [phone]      Your Vitals Were     BP Pulse Temp Resp Height Weight    94/51 (BP Location: Left arm, Patient Position: Lying, BP Method: Automatic) 60 97.5 °F (36.4 °C) (Skin) 16 5' (1.524 m) 77.1 kg (170 lb)    Last Period SpO2 BMI          01/07/2017 (Exact Date) 98% 33.2 kg/m2        Recent Lab Values     No lab values to display.      Pending Labs     Order Current Status    Specimen to Pathology - Surgery Collected (01/13/17 1131)      Allergies as of 1/13/2017        Reactions    Grapefruit Anaphylaxis    Pineapple Anaphylaxis    Augmentin [Amoxicillin-pot Clavulanate] Hives    Penicillins Hives    Tamiflu [Oseltamivir] Diarrhea, Nausea And Vomiting      Advance Directives     An advance directive is a document which, in the event you are no longer able to make decisions for yourself, tells your healthcare team what kind of treatment you do or do not want to receive, or who you would like to make those decisions for you.  If you do not currently have an advance directive, [facility]  encourages you to create one.  For more information call:  [phone]), [phone] ([phone]),  or log on to [email or web address].        Smoking Cessation     If you would like to quit smoking:   You may be eligible for free services if you are a [place] resident and started smoking cigarettes before September 1, 1988.  Call the Smoking Cessation Trust (SCT) toll free at (005) 378-6247 or (968) 902-1833.   Call 3-056-QUIT-NOW if you do not meet the above criteria.            Language Assistance Services     ATTENTION: Language assistance services are available, free of charge. Please call [phone].      ATENCIÓN: Si josela marah, tiene a shea disposición servicios gratuitos de asistencia lingüística. Llame al [phone].     [facility] Ý: N?u b?n nói Ti?ng Vi?t, có các d?ch v? h? tr? ngôn ng? mi?n phí dành cho b?n. G?i s? [phone].         [facility] complies with applicable Federal civil rights laws and does not discriminate on the basis of race, color, national origin, age, disability, or sex.

## 2017-01-13 NOTE — TELEPHONE ENCOUNTER
----- Message from Judy Sanderson sent at 1/12/2017  4:46 PM CST -----  Contact: self  Patient wants to speak with a nurse regarding her procedure tomorrow states she has a few questions. Please call back at [phone] (home)

## 2017-01-19 ENCOUNTER — TELEPHONE (OUTPATIENT)
Dept: GASTROENTEROLOGY | Facility: CLINIC | Age: 27
End: 2017-01-19

## 2017-01-19 DIAGNOSIS — R12 HEARTBURN: ICD-10-CM

## 2017-01-19 DIAGNOSIS — R07.9 NONSPECIFIC CHEST PAIN: ICD-10-CM

## 2017-01-19 DIAGNOSIS — N92.6 IRREGULAR PERIODS: ICD-10-CM

## 2017-01-19 DIAGNOSIS — K21.00 GASTROESOPHAGEAL REFLUX DISEASE WITH ESOPHAGITIS: ICD-10-CM

## 2017-01-19 DIAGNOSIS — R13.14 PHARYNGOESOPHAGEAL DYSPHAGIA: ICD-10-CM

## 2017-01-19 RX ORDER — PANTOPRAZOLE SODIUM 40 MG/1
40 TABLET, DELAYED RELEASE ORAL DAILY
Qty: 30 TABLET | Refills: 6 | Status: CANCELLED | OUTPATIENT
Start: 2017-01-19 | End: 2017-02-18

## 2017-01-19 RX ORDER — MEDROXYPROGESTERONE ACETATE 10 MG/1
TABLET ORAL
Qty: 10 TABLET | Refills: 0 | Status: SHIPPED | OUTPATIENT
Start: 2017-01-19 | End: 2017-05-08

## 2017-01-19 NOTE — TELEPHONE ENCOUNTER
----- Message from Tika Dyer sent at 1/19/2017 10:14 AM CST -----  Contact: Self  Need authorization for Rx Cantoprazole 40 mg (Sp0?    [facility] - [place] [address] [address] [facility] [address]  [address] [address]  Phone: [phone] Fax: [phone]    Never got filled because it was not authorized. Call back once completed [phone].Thank!

## 2017-01-23 ENCOUNTER — OFFICE VISIT (OUTPATIENT)
Dept: FAMILY MEDICINE | Facility: CLINIC | Age: 27
End: 2017-01-23
Payer: MEDICAID

## 2017-01-23 VITALS
HEART RATE: 88 BPM | TEMPERATURE: 98 F | RESPIRATION RATE: 16 BRPM | HEIGHT: 60 IN | WEIGHT: 169.75 LBS | SYSTOLIC BLOOD PRESSURE: 120 MMHG | DIASTOLIC BLOOD PRESSURE: 68 MMHG | BODY MASS INDEX: 33.33 KG/M2 | OXYGEN SATURATION: 98 %

## 2017-01-23 DIAGNOSIS — J06.9 VIRAL URI: ICD-10-CM

## 2017-01-23 DIAGNOSIS — R50.9 FEVER, UNSPECIFIED FEVER CAUSE: Primary | ICD-10-CM

## 2017-01-23 DIAGNOSIS — R09.1 PLEURISY: ICD-10-CM

## 2017-01-23 LAB
CTP QC/QA: YES
CTP QC/QA: YES
FLUAV AG NPH QL: NEGATIVE
FLUBV AG NPH QL: NEGATIVE
HETEROPH AB SER QL: NEGATIVE

## 2017-01-23 PROCEDURE — 86308 HETEROPHILE ANTIBODY SCREEN: CPT | Mod: ,,, | Performed by: NURSE PRACTITIONER

## 2017-01-23 PROCEDURE — 99214 OFFICE O/P EST MOD 30 MIN: CPT | Mod: S$GLB,,, | Performed by: NURSE PRACTITIONER

## 2017-01-23 PROCEDURE — 87804 INFLUENZA ASSAY W/OPTIC: CPT | Mod: ,,, | Performed by: NURSE PRACTITIONER

## 2017-01-23 RX ORDER — KETOROLAC TROMETHAMINE 10 MG/1
10 TABLET, FILM COATED ORAL EVERY 6 HOURS PRN
Qty: 15 TABLET | Refills: 0 | Status: SHIPPED | OUTPATIENT
Start: 2017-01-23 | End: 2017-01-28

## 2017-01-23 NOTE — PROGRESS NOTES
Subjective:       Patient ID: Carlos Coulter is a 26 y.o. female.    Chief Complaint: left shoulder pain, lung and back pain    HPI onset 4 days ago with fever, bodyaches. States she is Sleeping a lot. Hot bath help with the achy discomfort. Hurts to move. Neck, arms, lungs. Hurts to take a deep breath.  Having chills then sweats. Tylenol brings the fever down. No cough. Positive for Sore throat. Headache off and on. Eating fine. No N/V.  Having some mild diarrhea. No other family members sick. No other concerns. States it hurts in her right upper back and chest when she takes a deep breath or coughs.       The following portion of the patients history was reviewed and updated as appropriate: allergies, current medications, past medical and surgical history. Past social history and problem list reviewed. Family PMH and Past social history reviewed. Tobacco, Illicit drug use reviewed.     Review of Systems   Constitutional: Positive for chills, fatigue and fever. Negative for appetite change.   HENT: Positive for congestion, postnasal drip, rhinorrhea, sinus pressure, sneezing and sore throat.    Respiratory: Positive for cough, chest tightness and shortness of breath. Negative for wheezing.         Hurts to take a deep breath.    Cardiovascular: Negative for chest pain and palpitations.   Gastrointestinal: Positive for diarrhea. Negative for abdominal pain, constipation, nausea and vomiting.   Musculoskeletal: Positive for myalgias.   Neurological: Positive for headaches.       Objective:       Visit Vitals    /68    Pulse 88    Temp 97.9 °F (36.6 °C) (Oral)    Resp 16    Ht 5' (1.524 m)    Wt 77 kg (169 lb 12.1 oz)    LMP 01/07/2017 (Exact Date)    SpO2 98%    BMI 33.15 kg/m2     Physical Exam    Constitutional: oriented to person, place, and time. well-developed and well-nourished.   HENT: nares boggy. No facial pressure. Canals and TM normal. Throat with clear PND.   Head:  Normocephalic.   Eyes: Conjunctivae are normal. Pupils are equal, round, and reactive to light.   Neck: Normal range of motion. Neck supple. No tracheal deviation present.  No enlarged or tender anterior cervical lymph nodes.   Cardiovascular: Normal rate, regular rhythm and normal heart sounds.    Pulmonary/Chest: Effort normal and breath sounds normal. No respiratory distress. No wheezes.   Abdominal: Soft. Bowel sounds are normal. No distension. There is no tenderness.   Musculoskeletal: Normal range of motion. Gait and coordination normal.   Neurological: oriented to person, place, and time.   Skin: Skin is warm and dry. No rashes or lesions  Psychiatric: Normal mood and affect.Behavior is normal. Judgment and thought content normal.   Assessment:       1. Fever, unspecified fever cause    2. Pleurisy    3. Viral URI        Plan:         Carlos was seen today for left shoulder pain, lung and back pain.    Diagnoses and all orders for this visit:    Fever, unspecified fever cause  -     POCT Influenza A/B: negative  -     POCT Infectious mononucleosis antibody: negative    Pleurisy: possible pleurisy; toradol TID.     Viral URI: presents as viral. No indication that antibiotics are needed at this time.     Other orders  -     ketorolac (TORADOL) 10 mg tablet; Take 1 tablet (10 mg total) by mouth every 6 (six) hours as needed.    Continue current medication  Take medications only as prescribed  Healthy diet, exercise  Adequate rest  Adequate hydration  Avoid allergens  Avoid excessive caffeine

## 2017-01-23 NOTE — MR AVS SNAPSHOT
[facility]  [address]  [facility] [address]  Phone: [phone]  Fax: [phone]                  Carlos Coulter   2017 10:20 AM   Office Visit    Description:  Female : 1990   Provider:  Izzy Toney NP   Department:  [facility]           Reason for Visit     left shoulder pain, lung and back pain           Diagnoses this Visit        Comments    Fever, unspecified fever cause    -  Primary     Pleurisy         Viral URI                To Do List           Future Appointments        Provider Department Dept Phone    2017 10:30 AM [facility] XRFL1 [facility] [phone]      Goals (5 Years of Data)              3/31/16    11/30/15    11/18/15    BMI is less than 25   Not on track  Not on track  Not on track      Follow-Up and Disposition     Return in about 1 week (around 2017) for if symptoms are not improving or worsen..       These Medications        Disp Refills Start End    ketorolac (TORADOL) 10 mg tablet 15 tablet 0 2017    Take 1 tablet (10 mg total) by mouth every 6 (six) hours as needed. - Oral    Pharmacy: [facility] [address] [place] [address] [address]  #: [phone]         [facility]     [facility] -  Assistance  Registered nurses in the [facility] provide clinical advisement, health education, appointment booking, and other advisory services.  Call for this free service at [phone].             Medications           Message regarding Medications     Verify the changes and/or additions to your medication regime listed below are the same as discussed with your clinician today.  If any of these changes or additions are incorrect, please notify your healthcare provider.        START taking these NEW medications        Refills    ketorolac (TORADOL) 10 mg tablet  0    Sig: Take 1 tablet (10 mg total) by mouth every 6 (six) hours as needed.    Class: Normal    Route: Oral      STOP taking these medications     beclomethasone (QVAR) 80 mcg/actuation Aero Inhale 2 puffs into the lungs 2 (two) times daily.           Verify that the below list of medications is an accurate representation of the medications you are currently taking.  If none reported, the list may be blank. If incorrect, please contact your healthcare provider. Carry this list with you in case of emergency.           Current Medications     albuterol (PROAIR HFA) 90 mcg/actuation inhaler INHALE 1 PUFF EVERY 4 HOURS PRN for Wheezing    albuterol (PROVENTIL) 2.5 mg /3 mL (0.083 %) nebulizer solution Take 3 mLs (2.5 mg total) by nebulization every 6 (six) hours as needed for Wheezing.    aripiprazole (ABILIFY) 5 MG Tab Take 5 mg by mouth once daily.    clobetasol (TEMOVATE) 0.05 % cream Apply 1 application topically daily as needed.     FLONASE ALLERGY RELIEF 50 mcg/actuation nasal spray SHAKE WELL AND USE 2 SPRAYS IN EACH NOSTRIL EVERY DAY    inhalation device (BREATHERITE VALVED MDI SPACER) Use as directed for inhalation.    levothyroxine (SYNTHROID) 88 MCG tablet TAKE 1 TABLET BY MOUTH EVERY MORNING BEFORE BREAKFAST    lisdexamfetamine (VYVANSE) 40 MG Cap Take 40 mg by mouth once daily.    medroxyPROGESTERone (PROVERA) 10 MG tablet TAKE 1 TABLET(10 MG) BY MOUTH EVERY DAY    montelukast (SINGULAIR) 10 mg tablet Take 1 tablet (10 mg total) by mouth once daily.    pantoprazole (PROTONIX) 40 MG tablet Take 1 tablet (40 mg total) by mouth once daily. Before breakfast    prenatal vit#103-iron-FA () 27 mg iron- 1 mg Tab Take 1 tablet by mouth once daily.    sucralfate (CARAFATE) 1 gram tablet Take 1 tablet (1 g total) by mouth 4 (four) times daily.    trazodone (DESYREL) 100 MG tablet Take 1 tablet by mouth every evening.    trazodone (DESYREL) 50 MG tablet Take 50 mg by mouth nightly as needed for Insomnia.     triamcinolone acetonide 0.1% (KENALOG) 0.1 % ointment AAA bid x 2 weeks then prn, avoid chronic use    ketorolac (TORADOL) 10 mg tablet Take 1 tablet (10 mg total) by mouth every 6 (six) hours as needed.           Clinical Reference Information           Vital Signs - Last Recorded  Most recent update: 1/23/2017 10:18 AM by Joceline Gomez LPN    BP Pulse Temp Resp Ht Wt    120/68 88 97.9 °F (36.6 °C) (Oral) 16 5' (1.524 m) 77 kg (169 lb 12.1 oz)    LMP SpO2 BMI          01/07/2017 (Exact Date) 98% 33.15 kg/m2        Blood Pressure          Most Recent Value    BP  120/68      Allergies as of 1/23/2017     Grapefruit    Pineapple    Augmentin [Amoxicillin-pot Clavulanate]    Penicillins    Tamiflu [Oseltamivir]      Immunizations Administered on Date of Encounter - 1/23/2017     None      Orders Placed During Today's Visit      Normal Orders This Visit    POCT Infectious mononucleosis antibody     POCT Influenza A/B

## 2017-01-27 ENCOUNTER — TELEPHONE (OUTPATIENT)
Dept: GASTROENTEROLOGY | Facility: CLINIC | Age: 27
End: 2017-01-27

## 2017-01-27 NOTE — TELEPHONE ENCOUNTER
Received prior auth request for protonix.   Form started and passed to Navdeep Chacon to have provider finish on Monday.

## 2017-02-07 ENCOUNTER — HOSPITAL ENCOUNTER (OUTPATIENT)
Dept: RADIOLOGY | Facility: HOSPITAL | Age: 27
Discharge: HOME | End: 2017-02-07
Attending: NURSE PRACTITIONER
Payer: MEDICAID

## 2017-02-07 DIAGNOSIS — R07.9 NONSPECIFIC CHEST PAIN: ICD-10-CM

## 2017-02-07 DIAGNOSIS — R13.14 PHARYNGOESOPHAGEAL DYSPHAGIA: ICD-10-CM

## 2017-02-07 DIAGNOSIS — K21.00 GASTROESOPHAGEAL REFLUX DISEASE WITH ESOPHAGITIS: ICD-10-CM

## 2017-02-07 DIAGNOSIS — R12 HEARTBURN: ICD-10-CM

## 2017-02-07 PROCEDURE — 74247 FL UPPER GI AIR CONTRAST WITH KUB: CPT | Mod: 26,,, | Performed by: RADIOLOGY

## 2017-02-07 PROCEDURE — 74247 FL UPPER GI AIR CONTRAST WITH KUB: CPT | Mod: TC,PO

## 2017-02-09 ENCOUNTER — TELEPHONE (OUTPATIENT)
Dept: GASTROENTEROLOGY | Facility: CLINIC | Age: 27
End: 2017-02-09

## 2017-02-09 NOTE — TELEPHONE ENCOUNTER
----- Message from Kathy Esteban sent at 2/9/2017 12:57 PM CST -----  Contact: self  Patient is calling for upper GI results. Please call patient at [phone]. Thanks!

## 2017-02-13 NOTE — TELEPHONE ENCOUNTER
----- Message from JENNIFER Gagnon sent at 2/7/2017 12:37 PM CST -----  Please call to inform & review the results with the patient- Upper GI radiology study showed hiatal hernia with moderate to severe GERD. No signs of ulcers. Continue reflux recommendations as discussed during our visit.  Thanks,  Mayra TERRELL

## 2017-02-14 ENCOUNTER — OFFICE VISIT (OUTPATIENT)
Dept: ORTHOPEDICS | Facility: CLINIC | Age: 27
End: 2017-02-14
Payer: MEDICAID

## 2017-02-14 VITALS
WEIGHT: 169 LBS | BODY MASS INDEX: 33.18 KG/M2 | HEART RATE: 70 BPM | SYSTOLIC BLOOD PRESSURE: 106 MMHG | HEIGHT: 60 IN | DIASTOLIC BLOOD PRESSURE: 66 MMHG

## 2017-02-14 DIAGNOSIS — S83.511A NEW ACL TEAR, RIGHT, INITIAL ENCOUNTER: Primary | ICD-10-CM

## 2017-02-14 DIAGNOSIS — M25.561 ACUTE PAIN OF RIGHT KNEE: ICD-10-CM

## 2017-02-14 DIAGNOSIS — M94.261 CHONDROMALACIA OF KNEE, RIGHT: ICD-10-CM

## 2017-02-14 DIAGNOSIS — M25.461 KNEE EFFUSION, RIGHT: ICD-10-CM

## 2017-02-14 PROCEDURE — 99999 PR PBB SHADOW E&M-EST. PATIENT-LVL III: CPT | Mod: PBBFAC,,, | Performed by: ORTHOPAEDIC SURGERY

## 2017-02-14 PROCEDURE — 99213 OFFICE O/P EST LOW 20 MIN: CPT | Mod: PBBFAC,PO | Performed by: ORTHOPAEDIC SURGERY

## 2017-02-14 PROCEDURE — 99213 OFFICE O/P EST LOW 20 MIN: CPT | Mod: S$PBB,,, | Performed by: ORTHOPAEDIC SURGERY

## 2017-02-14 NOTE — MR AVS SNAPSHOT
[facility]  [address] [facility] [address]  [place] [address]  Phone: [phone]                  Carlos Coulter   2017 11:15 AM   Office Visit    Description:  Female : 1990   Provider:  Kalen Kaufman MD   Department:  [facility]           Reason for Visit     Right Knee - Pain           Diagnoses this Visit        Comments    New ACL tear, right, initial encounter    -  Primary     Chondromalacia of knee, right         Acute pain of right knee         Knee effusion, right                To Do List           Future Appointments        Provider Department Dept Phone    2017 6:00 PM [facility] MRI1 [facility] [phone]    2017 9:45 AM Kalen Kaufman MD [facility] [phone]      Goals (5 Years of Data)              3/31/16    11/30/15    11/18/15    BMI is less than 25   Not on track  Not on track  Not on track      [facility]     [facility] Nurse Care Line -  Assistance  Registered nurses in the [facility] provide clinical advisement, health education, appointment booking, and other advisory services.  Call for this free service at [phone].             Medications           Message regarding Medications     Verify the changes and/or additions to your medication regime listed below are the same as discussed with your clinician today.  If any of these changes or additions are incorrect, please notify your healthcare provider.             Verify that the below list of medications is an accurate representation of the medications you are currently taking.  If none reported, the list may be blank. If incorrect, please contact your healthcare provider. Carry this list with you in case of emergency.           Current Medications     albuterol (PROAIR HFA) 90 mcg/actuation inhaler INHALE 1 PUFF EVERY 4 HOURS PRN for Wheezing    albuterol (PROVENTIL) 2.5 mg /3 mL (0.083 %) nebulizer solution Take  3 mLs (2.5 mg total) by nebulization every 6 (six) hours as needed for Wheezing.    aripiprazole (ABILIFY) 5 MG Tab Take 5 mg by mouth once daily.    clobetasol (TEMOVATE) 0.05 % cream Apply 1 application topically daily as needed.     FLONASE ALLERGY RELIEF 50 mcg/actuation nasal spray SHAKE WELL AND USE 2 SPRAYS IN EACH NOSTRIL EVERY DAY    inhalation device (BREATHERITE VALVED MDI SPACER) Use as directed for inhalation.    levothyroxine (SYNTHROID) 88 MCG tablet TAKE 1 TABLET BY MOUTH EVERY MORNING BEFORE BREAKFAST    lisdexamfetamine (VYVANSE) 40 MG Cap Take 40 mg by mouth once daily.    medroxyPROGESTERone (PROVERA) 10 MG tablet TAKE 1 TABLET(10 MG) BY MOUTH EVERY DAY    montelukast (SINGULAIR) 10 mg tablet Take 1 tablet (10 mg total) by mouth once daily.    pantoprazole (PROTONIX) 40 MG tablet Take 1 tablet (40 mg total) by mouth once daily. Before breakfast    prenatal vit#103-iron-FA () 27 mg iron- 1 mg Tab Take 1 tablet by mouth once daily.    sucralfate (CARAFATE) 1 gram tablet Take 1 tablet (1 g total) by mouth 4 (four) times daily.    trazodone (DESYREL) 100 MG tablet Take 1 tablet by mouth every evening.    trazodone (DESYREL) 50 MG tablet Take 50 mg by mouth nightly as needed for Insomnia.    triamcinolone acetonide 0.1% (KENALOG) 0.1 % ointment AAA bid x 2 weeks then prn, avoid chronic use           Clinical Reference Information           Your Vitals Were     BP Pulse Height Weight BMI    106/66 70 5' (1.524 m) 76.7 kg (169 lb) 33.01 kg/m2      Blood Pressure          Most Recent Value    BP  106/66      Allergies as of 2/14/2017     Grapefruit    Pineapple    Augmentin [Amoxicillin-pot Clavulanate]    Penicillins    Tamiflu [Oseltamivir]      Immunizations Administered on Date of Encounter - 2/14/2017     None      Orders Placed During Today's Visit      Normal Orders This Visit    Ambulatory Referral to Physical/Occupational Therapy     Future Labs/Procedures Expected by Expires    MRI Lower  Extremity Joint WO Cont Right  2/14/2017 2/14/2018      Language Assistance Services     ATTENTION: Language assistance services are available, free of charge. Please call [phone].      ATENCIÓN: Si habla marah, tiene a shea disposición servicios gratuitos de asistencia lingüística. Llame al [phone].     CHÚ Ý: N?u b?n nói Ti?ng Vi?t, có các d?ch v? h? tr? ngôn ng? mi?n phí dành cho b?n. G?i s? [phone].         [facility] complies with applicable Federal civil rights laws and does not discriminate on the basis of race, color, national origin, age, disability, or sex.

## 2017-02-14 NOTE — PROGRESS NOTES
CC:right KNEE pain    HISTORY OF PRESENT ILLNESS:  Carlos Coulter is a 26 y.o. right hand dominant Female who reports right knee pain since a fall while on a trampoline    History ispositive fortrauma fall.    Today the patient rates pain at a 5/10 on visual analog scale.      The patient has tried Advil and brace to make the pain better with some relief of the pain.    She reports that the pain is worse with bending; walking.  It does wake the patient at night.    negative for mechanical symptoms, negative forinstability but has been wearing brace, significant swelling at the time of injury    It does affect the performance of ADLs     History of right knee arthroscopy by Dr. Holloway in Fall 2015    Past Medical History   Diagnosis Date    ADHD (attention deficit hyperactivity disorder)      sees psych    AR (allergic rhinitis)      causes frequent ear problems    Asthma      since childhood, some wheeze with exertion    Bilateral club feet      at birth; casted B    Bipolar disorder      sees psych    Chronic constipation     Constipation     Depression      sees psych    Eczema     GERD (gastroesophageal reflux disease)     Growth hormone deficiency      tx with growth hormone ages 12 - 15 by Dr Brewer    Heart murmur      saw cardiology 9/2014, intermittent, asymptomatic    Hypothyroidism      sees Dr Brewer (endo)    Insomnia     Learning disability     Speech abnormality        Past Surgical History   Procedure Laterality Date    Tympanostomy tube placement  age 4    Adenoidectomy  age 4    Foot surgery  11/13/2014. 3/2015     L foot    Foot surgery Right 10/2014     Right foot surgery    Muscle release Bilateral      Lower extremities due to congenital club feet    Multiple tooth extractions      Knee surgery  right/11/2015    Upper gastrointestinal endoscopy  05/2016     Dr. Nguyen       Family History   Problem Relation Age of Onset    Diabetes Mother     Heart disease  Mother     Thyroid disease Mother     Depression Mother     Asthma Mother     Arthritis Mother     Allergic rhinitis Mother     Colon polyps Mother     Depression Father     Migraines Father     Thyroid disease Father     PAVEL disease Father     Early death Brother       1 hour after birth    Thyroid disease Maternal Grandmother     Seizures Maternal Grandmother     Clotting disorder Maternal Grandmother     Pulmonary fibrosis Maternal Grandfather     Colon cancer Maternal Grandfather     Diabetes Paternal Grandmother     Arthritis Paternal Grandmother     Schizophrenia Paternal Grandmother     Depression Paternal Grandmother     Early death Paternal Grandfather 54    Aneurysm Paternal Grandfather     Angioedema Neg Hx     Immunodeficiency Neg Hx     Urticaria Neg Hx     Collagen disease Neg Hx          Current Outpatient Prescriptions:     albuterol (PROAIR HFA) 90 mcg/actuation inhaler, INHALE 1 PUFF EVERY 4 HOURS PRN for Wheezing, Disp: 8.5 g, Rfl: 2    albuterol (PROVENTIL) 2.5 mg /3 mL (0.083 %) nebulizer solution, Take 3 mLs (2.5 mg total) by nebulization every 6 (six) hours as needed for Wheezing., Disp: 1 Box, Rfl: 3    aripiprazole (ABILIFY) 5 MG Tab, Take 5 mg by mouth once daily., Disp: , Rfl:     clobetasol (TEMOVATE) 0.05 % cream, Apply 1 application topically daily as needed. , Disp: , Rfl:     FLONASE ALLERGY RELIEF 50 mcg/actuation nasal spray, SHAKE WELL AND USE 2 SPRAYS IN EACH NOSTRIL EVERY DAY, Disp: 1 Bottle, Rfl: 11    inhalation device (BREATHERITE VALVED MDI SPACER), Use as directed for inhalation., Disp: 1 Device, Rfl: 0    levothyroxine (SYNTHROID) 88 MCG tablet, TAKE 1 TABLET BY MOUTH EVERY MORNING BEFORE BREAKFAST, Disp: 90 tablet, Rfl: 1    lisdexamfetamine (VYVANSE) 40 MG Cap, Take 40 mg by mouth once daily., Disp: , Rfl:     medroxyPROGESTERone (PROVERA) 10 MG tablet, TAKE 1 TABLET(10 MG) BY MOUTH EVERY DAY, Disp: 10 tablet, Rfl: 0    montelukast  (SINGULAIR) 10 mg tablet, Take 1 tablet (10 mg total) by mouth once daily., Disp: 90 tablet, Rfl: 3    pantoprazole (PROTONIX) 40 MG tablet, Take 1 tablet (40 mg total) by mouth once daily. Before breakfast, Disp: 30 tablet, Rfl: 6    prenatal vit#103-iron-FA () 27 mg iron- 1 mg Tab, Take 1 tablet by mouth once daily., Disp: 30 tablet, Rfl: 11    sucralfate (CARAFATE) 1 gram tablet, Take 1 tablet (1 g total) by mouth 4 (four) times daily., Disp: 40 tablet, Rfl: 0    trazodone (DESYREL) 100 MG tablet, Take 1 tablet by mouth every evening., Disp: , Rfl: 0    trazodone (DESYREL) 50 MG tablet, Take 50 mg by mouth nightly as needed for Insomnia., Disp: , Rfl:     triamcinolone acetonide 0.1% (KENALOG) 0.1 % ointment, AAA bid x 2 weeks then prn, avoid chronic use, Disp: 454 g, Rfl: 1    Review of patient's allergies indicates:   Allergen Reactions    Grapefruit Anaphylaxis    Pineapple Anaphylaxis    Augmentin [amoxicillin-pot clavulanate] Hives    Penicillins Hives    Tamiflu [oseltamivir] Diarrhea and Nausea And Vomiting       Review of Systems   Constitutional: Positive for weight loss.   Respiratory:        Asthma   Musculoskeletal: Positive for joint pain.   Psychiatric/Behavioral: Positive for depression. The patient is nervous/anxious.        OBJECTIVE:  Vitals:    02/14/17 1033   BP: 106/66   Pulse: 70       PHYSICAL EXAMINATION    General:  The patient is alert and oriented x 3.  Mood is pleasant.  Observation of ears, eyes and nose reveal no gross abnormalities.  No labored breathing observed.    Right KNEE EXAMINATION     OBSERVATION / INSPECTION   Gait:   antalgic   Alignment:  Neutral   Scars:   Previous right knee scope  Muscle atrophy: Mild  Effusion:  Mild on right  Warmth:  None   Discoloration:   None    TENDERNESS / CREPITUS (T / C):          T / C      T / C   Patella   +/ -   Lateral joint line   - / -    Peripatellar medial  -  Medial joint line    - / -    Peripatellar  lateral -  Medial plica   - / -    Patellar tendon -   Popliteal fossa   - / -    Quad tendon   -   Gastrocnemius   -   Prepatellar Bursa - / -   Quadricep   -   Tibial tubercle  -  Thigh/hamstring  -   Pes anserine/HS -  Fibula    -   ITB   - / -  Tibia     -   Tib/fib joint  - / -  LCL    -     MFC   - / -   MCL: Proximal  -    LFC   - / -    Distal   -          ROM: (* = pain)  PASSIVE   ACTIVE    Left :   5 / 0 / 145   5 / 0 / 145     Right :     0 / 140   0 / 140 with pain    Patellofemoral examination:  See above noted areas of tenderness.   Patella position    Subluxation / dislocation: Centered           Sup. / Inf;   Normal   Crepitus (PF):    Absent   Patellar Mobility:       Medial-lateral:   Normal    Superior-inferior:  Normal    Inferior tilt   Normal    Patellar tendon:  Normal   Lateral tilt:    Normal   J-sign:     None   Patellofemoral grind:   No pain       MENISCAL SIGNS:     Pain on terminal extension:  +  Pain on terminal flexion:  +  Jerseys maneuver:  - for pain         LIGAMENT EXAMINATION:  ACL / Lachman:  Grade II  PCL-Post.  drawer: normal 0 to 2mm  MCL- Valgus:  normal 0 to 2mm  LCL- Varus:  normal 0 to 2mm      STRENGTH: (* = with pain) PAINFUL SIDE   Quadricep   5/5   Hamstrin/5    EXTREMITY NEURO-VASCULAR EXAMINATION:   Sensation:  Grossly intact to light touch all dermatomal regions.   Motor Function:  Fully intact motor function at hip, knee, foot and ankle    Vascular status:  DP and PT pulses 2+, brisk capillary refill, symmetric.      Xrays: no new x-rays     ASSESSMENT:    Right Knee Pain with history of chondromalacia; now with possible ACL tear    PLAN:   MRI of right knee  Physical therapy  Continue with right knee brace  F/U after MRI   All questions were answered, pt will contact us for questions or concerns in the interim.

## 2017-02-16 RX ORDER — CLOBETASOL PROPIONATE 0.5 MG/G
CREAM TOPICAL
Qty: 60 G | Refills: 0 | Status: SHIPPED | OUTPATIENT
Start: 2017-02-16 | End: 2017-04-10

## 2017-02-22 ENCOUNTER — HOSPITAL ENCOUNTER (OUTPATIENT)
Dept: RADIOLOGY | Facility: HOSPITAL | Age: 27
Discharge: HOME | End: 2017-02-22
Attending: ORTHOPAEDIC SURGERY
Payer: MEDICAID

## 2017-02-22 ENCOUNTER — TELEPHONE (OUTPATIENT)
Dept: GASTROENTEROLOGY | Facility: CLINIC | Age: 27
End: 2017-02-22

## 2017-02-22 DIAGNOSIS — M25.561 ACUTE PAIN OF RIGHT KNEE: ICD-10-CM

## 2017-02-22 DIAGNOSIS — S83.511A NEW ACL TEAR, RIGHT, INITIAL ENCOUNTER: ICD-10-CM

## 2017-02-22 DIAGNOSIS — M94.261 CHONDROMALACIA OF KNEE, RIGHT: ICD-10-CM

## 2017-02-22 DIAGNOSIS — M25.461 KNEE EFFUSION, RIGHT: ICD-10-CM

## 2017-02-22 PROCEDURE — 73721 MRI JNT OF LWR EXTRE W/O DYE: CPT | Mod: TC,PO,RT

## 2017-02-22 PROCEDURE — 73721 MRI JNT OF LWR EXTRE W/O DYE: CPT | Mod: 26,RT,, | Performed by: RADIOLOGY

## 2017-02-22 NOTE — TELEPHONE ENCOUNTER
----- Message from Rin De Luna sent at 2/20/2017  4:14 PM CST -----  Contact: Carlos  Patient is calling again regarding medication (does not know name).     [facility] [address] [facility] [place] [address] [facility] [address]  [address] [place] [address]  Phone: [phone] Fax: [phone]    Please call [phone]. Thanks!

## 2017-02-23 NOTE — TELEPHONE ENCOUNTER
S/W Rosey @ [facility] [phone] & she states they never rec'd pharmacy prior auth request that we faxed 1/27/17, states just rec'd 2 we faxed yesterday & 1 was a duplicate for Protonix 40mg tabs; use the 1st request for Pending [facility] #[id number]; they will send back a response w/in normal time via fax; contacted pt & advised her of the same & that she can try & check w/ her pharmacy poss tomorrow PM or Monday AM/PM to see if it will go thru; let us know if further problems; pt verbalizes understanding.

## 2017-02-24 ENCOUNTER — TELEPHONE (OUTPATIENT)
Dept: FAMILY MEDICINE | Facility: CLINIC | Age: 27
End: 2017-02-24

## 2017-02-24 ENCOUNTER — OFFICE VISIT (OUTPATIENT)
Dept: ORTHOPEDICS | Facility: CLINIC | Age: 27
End: 2017-02-24
Payer: MEDICAID

## 2017-02-24 VITALS
BODY MASS INDEX: 33.18 KG/M2 | DIASTOLIC BLOOD PRESSURE: 74 MMHG | WEIGHT: 169 LBS | SYSTOLIC BLOOD PRESSURE: 119 MMHG | HEART RATE: 72 BPM | HEIGHT: 60 IN

## 2017-02-24 DIAGNOSIS — S83.511D NEW ACL TEAR, RIGHT, SUBSEQUENT ENCOUNTER: Primary | ICD-10-CM

## 2017-02-24 DIAGNOSIS — M25.461 KNEE EFFUSION, RIGHT: ICD-10-CM

## 2017-02-24 DIAGNOSIS — Z98.890 S/P RIGHT KNEE ARTHROSCOPY: ICD-10-CM

## 2017-02-24 DIAGNOSIS — G89.18 POST-OP PAIN: ICD-10-CM

## 2017-02-24 DIAGNOSIS — M25.561 ACUTE PAIN OF RIGHT KNEE: ICD-10-CM

## 2017-02-24 DIAGNOSIS — S83.241A ACUTE MEDIAL MENISCUS TEAR, RIGHT, INITIAL ENCOUNTER: ICD-10-CM

## 2017-02-24 DIAGNOSIS — M94.261 CHONDROMALACIA OF KNEE, RIGHT: ICD-10-CM

## 2017-02-24 PROCEDURE — 99214 OFFICE O/P EST MOD 30 MIN: CPT | Mod: PBBFAC,PO | Performed by: ORTHOPAEDIC SURGERY

## 2017-02-24 PROCEDURE — 99214 OFFICE O/P EST MOD 30 MIN: CPT | Mod: 57,S$PBB,, | Performed by: ORTHOPAEDIC SURGERY

## 2017-02-24 PROCEDURE — 99999 PR PBB SHADOW E&M-EST. PATIENT-LVL IV: CPT | Mod: PBBFAC,,, | Performed by: ORTHOPAEDIC SURGERY

## 2017-02-24 RX ORDER — PROMETHAZINE HYDROCHLORIDE 25 MG/1
25 TABLET ORAL EVERY 6 HOURS PRN
Qty: 20 TABLET | Refills: 0 | Status: SHIPPED | OUTPATIENT
Start: 2017-02-24 | End: 2017-05-30

## 2017-02-24 RX ORDER — OXYCODONE AND ACETAMINOPHEN 5; 325 MG/1; MG/1
1 TABLET ORAL EVERY 8 HOURS PRN
Qty: 90 TABLET | Refills: 0 | Status: SHIPPED | OUTPATIENT
Start: 2017-02-24 | End: 2017-05-30

## 2017-02-24 RX ORDER — DOCUSATE SODIUM 100 MG/1
100 CAPSULE, LIQUID FILLED ORAL 2 TIMES DAILY
Qty: 60 CAPSULE | Refills: 0 | Status: SHIPPED | OUTPATIENT
Start: 2017-02-24 | End: 2018-03-02

## 2017-02-24 NOTE — H&P
CC:right KNEE pain    HISTORY OF PRESENT ILLNESS:  Carlos Coultre is a 26 y.o. right hand dominant Female who reports right knee pain since a fall while on a trampoline; she is here for f/u after MRI    History ispositive fortrauma fall.    Today the patient rates pain at a 5/10 on visual analog scale.      The patient has tried Advil and brace to make the pain better with some relief of the pain.    She reports that the pain is worse with bending; walking.  It does wake the patient at night.    negative for mechanical symptoms, negative forinstability but has been wearing brace, significant swelling at the time of injury    It does affect the performance of ADLs     History of right knee arthroscopy by Dr. 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 3/2015    L foot    FOOT SURGERY Right 10/2014    Right foot surgery    KNEE SURGERY  right/11/2015    MULTIPLE TOOTH EXTRACTIONS      MUSCLE RELEASE Bilateral     Lower extremities due to congenital club feet    TYMPANOSTOMY TUBE PLACEMENT  age 4    UPPER GASTROINTESTINAL ENDOSCOPY  05/2016    Dr. Nguyen       Family History   Problem Relation Age of Onset    Diabetes Mother      Heart disease Mother     Thyroid disease Mother     Depression Mother     Asthma Mother     Arthritis Mother     Allergic rhinitis Mother     Colon polyps Mother     Depression Father     Migraines Father     Thyroid disease Father     PAVEL disease Father     Early death Brother       1 hour after birth    Thyroid disease Maternal Grandmother     Seizures Maternal Grandmother     Clotting disorder Maternal Grandmother     Pulmonary fibrosis Maternal Grandfather     Colon cancer Maternal Grandfather     Diabetes Paternal Grandmother     Arthritis Paternal Grandmother     Schizophrenia Paternal Grandmother     Depression Paternal Grandmother     Early death Paternal Grandfather 54    Aneurysm Paternal Grandfather     Angioedema Neg Hx     Immunodeficiency Neg Hx     Urticaria Neg Hx     Collagen disease Neg Hx          Current Outpatient Prescriptions:     albuterol (PROAIR HFA) 90 mcg/actuation inhaler, INHALE 1 PUFF EVERY 4 HOURS PRN for Wheezing, Disp: 8.5 g, Rfl: 2    albuterol (PROVENTIL) 2.5 mg /3 mL (0.083 %) nebulizer solution, Take 3 mLs (2.5 mg total) by nebulization every 6 (six) hours as needed for Wheezing., Disp: 1 Box, Rfl: 3    aripiprazole (ABILIFY) 5 MG Tab, Take 5 mg by mouth once daily., Disp: , Rfl:     clobetasol (TEMOVATE) 0.05 % cream, Apply 1 application topically daily as needed. , Disp: , Rfl:     clobetasol (TEMOVATE) 0.05 % cream, APPLY EXTERNALLY TO THE AFFECTED AREA TWICE DAILY, Disp: 60 g, Rfl: 0    FLONASE ALLERGY RELIEF 50 mcg/actuation nasal spray, SHAKE WELL AND USE 2 SPRAYS IN EACH NOSTRIL EVERY DAY, Disp: 1 Bottle, Rfl: 11    inhalation device (BREATHERITE VALVED MDI SPACER), Use as directed for inhalation., Disp: 1 Device, Rfl: 0    levothyroxine (SYNTHROID) 88 MCG tablet, TAKE 1 TABLET BY MOUTH EVERY MORNING BEFORE BREAKFAST, Disp: 90 tablet, Rfl: 1    lisdexamfetamine (VYVANSE) 40 MG Cap, Take 40 mg by mouth once daily., Disp: , Rfl:      medroxyPROGESTERone (PROVERA) 10 MG tablet, TAKE 1 TABLET(10 MG) BY MOUTH EVERY DAY, Disp: 10 tablet, Rfl: 0    montelukast (SINGULAIR) 10 mg tablet, Take 1 tablet (10 mg total) by mouth once daily., Disp: 90 tablet, Rfl: 3    prenatal vit#103-iron-FA () 27 mg iron- 1 mg Tab, Take 1 tablet by mouth once daily., Disp: 30 tablet, Rfl: 11    sucralfate (CARAFATE) 1 gram tablet, Take 1 tablet (1 g total) by mouth 4 (four) times daily., Disp: 40 tablet, Rfl: 0    trazodone (DESYREL) 100 MG tablet, Take 1 tablet by mouth every evening., Disp: , Rfl: 0    trazodone (DESYREL) 50 MG tablet, Take 50 mg by mouth nightly as needed for Insomnia., Disp: , Rfl:     triamcinolone acetonide 0.1% (KENALOG) 0.1 % ointment, AAA bid x 2 weeks then prn, avoid chronic use, Disp: 454 g, Rfl: 1    pantoprazole (PROTONIX) 40 MG tablet, Take 1 tablet (40 mg total) by mouth once daily. Before breakfast, Disp: 30 tablet, Rfl: 6    Review of patient's allergies indicates:   Allergen Reactions    Grapefruit Anaphylaxis    Pineapple Anaphylaxis    Augmentin [amoxicillin-pot clavulanate] Hives    Penicillins Hives    Tamiflu [oseltamivir] Diarrhea and Nausea And Vomiting       Review of Systems   Constitutional: Positive for weight loss.   Respiratory:        Asthma   Musculoskeletal: Positive for joint pain.   Psychiatric/Behavioral: Positive for depression. The patient is nervous/anxious.        OBJECTIVE:  Vitals:    02/24/17 0952   BP: 119/74   Pulse: 72       PHYSICAL EXAMINATION    General:  The patient is alert and oriented x 3.  Mood is pleasant.  Observation of ears, eyes and nose reveal no gross abnormalities.  No labored breathing observed.    Right KNEE EXAMINATION     OBSERVATION / INSPECTION   Gait:   antalgic   Alignment:  Neutral   Scars:   Previous right knee scope  Muscle atrophy: Mild  Effusion:  Mild on right  Warmth:  None   Discoloration:   None    TENDERNESS / CREPITUS (T / C):          T / C      T /  C   Patella   +/ -   Lateral joint line   - / -    Peripatellar medial  -  Medial joint line    - / -    Peripatellar lateral -  Medial plica   - / -    Patellar tendon -   Popliteal fossa   - / -    Quad tendon   -   Gastrocnemius   -   Prepatellar Bursa - / -   Quadricep   -   Tibial tubercle  -  Thigh/hamstring  -   Pes anserine/HS -  Fibula    -   ITB   - / -  Tibia     -   Tib/fib joint  - / -  LCL    -     MFC   - / -   MCL: Proximal  -    LFC   - / -    Distal   -          ROM: (* = pain)  PASSIVE   ACTIVE    Left :   5 / 0 / 145   5 / 0 / 145     Right :     0 / 140   0 / 140 with pain    Patellofemoral examination:  See above noted areas of tenderness.   Patella position    Subluxation / dislocation: Centered           Sup. / Inf;   Normal   Crepitus (PF):    Absent   Patellar Mobility:       Medial-lateral:   Normal    Superior-inferior:  Normal    Inferior tilt   Normal    Patellar tendon:  Normal   Lateral tilt:    Normal   J-sign:     None   Patellofemoral grind:   No pain       MENISCAL SIGNS:     Pain on terminal extension:  +  Pain on terminal flexion:  +  Jerseys maneuver:  - for pain         LIGAMENT EXAMINATION:  ACL / Lachman:  Grade II  PCL-Post.  drawer: normal 0 to 2mm  MCL- Valgus:  normal 0 to 2mm  LCL- Varus:  normal 0 to 2mm      STRENGTH: (* = with pain) PAINFUL SIDE   Quadricep   5/5   Hamstrin/5    EXTREMITY NEURO-VASCULAR EXAMINATION:   Sensation:  Grossly intact to light touch all dermatomal regions.   Motor Function:  Fully intact motor function at hip, knee, foot and ankle    Vascular status:  DP and PT pulses 2+, brisk capillary refill, symmetric.      Xrays: no new x-rays  MRI:  1.  Full thickness tear of the anterior cruciate ligament within its midsubstance with associated kissing contusions of the anterior weight-bearing lateral femoral condyle and posterolateral tibial plateau.    2.  Peripheral vertical tear of the posterior horn of the medial meniscus    3.   Horizontal oblique tear of the posterior horn of the lateral meniscus which extends to the superior articular surface    4.  Minimal grade I strain versus contusion of the lateral head of the gastrocnemius muscle    5.  Small knee joint effusion     ASSESSMENT:    Right Knee Pain with history of chondromalacia; injury with ACL tear; meniscus tear    PLAN:   We reviewed with Carlos today, the pathology and natural history of her diagnosis. We have discussed a variety of treatment options including medications, physical therapy and other alternative treatments. I also explained the indications, risks and benefits of surgery. After discussion, Carlos decided to proceed with surgery. The decision was made to go forward with     Right knee arthrosocopic ACL reconstruction using hamstring autograft; meniscus repair vs. Debridement  DOS: 9 March 2017; [facility]    The details of the surgical procedure were explained, including the location of probable incisions and a description of likely hardware and/or grafts to be used.  The patient understands the likely convalescence after surgery.  Also, we have thoroughly discussed the risks, benefits and alternatives to surgery, including, but not limited to, the risk of infection, joint stiffness, blood clot (including DVT and/or pulmonary embolus), neurologic and vascular injury.  It was explained that, if tissue has been repaired or reconstructed, there is a chance of failure, which may require further management.    All of the patient's questions were answered and informed consent was obtained. The patient will contact us if they have any questions or concerns in the interim.

## 2017-02-24 NOTE — PROGRESS NOTES
CC:right KNEE pain    HISTORY OF PRESENT ILLNESS:  Carlos Coulter is a 26 y.o. right hand dominant Female who reports right knee pain since a fall while on a trampoline; she is here for f/u after MRI    History ispositive fortrauma fall.    Today the patient rates pain at a 5/10 on visual analog scale.      The patient has tried Advil and brace to make the pain better with some relief of the pain.    She reports that the pain is worse with bending; walking.  It does wake the patient at night.    negative for mechanical symptoms, negative forinstability but has been wearing brace, significant swelling at the time of injury    It does affect the performance of ADLs     History of right knee arthroscopy by Dr. 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 3/2015    L foot    FOOT SURGERY Right 10/2014    Right foot surgery    KNEE SURGERY  right/11/2015    MULTIPLE TOOTH EXTRACTIONS      MUSCLE RELEASE Bilateral     Lower extremities due to congenital club feet    TYMPANOSTOMY TUBE PLACEMENT  age 4    UPPER GASTROINTESTINAL ENDOSCOPY  05/2016    Dr. Nguyen       Family History   Problem Relation Age of Onset    Diabetes Mother      Heart disease Mother     Thyroid disease Mother     Depression Mother     Asthma Mother     Arthritis Mother     Allergic rhinitis Mother     Colon polyps Mother     Depression Father     Migraines Father     Thyroid disease Father     PAVEL disease Father     Early death Brother       1 hour after birth    Thyroid disease Maternal Grandmother     Seizures Maternal Grandmother     Clotting disorder Maternal Grandmother     Pulmonary fibrosis Maternal Grandfather     Colon cancer Maternal Grandfather     Diabetes Paternal Grandmother     Arthritis Paternal Grandmother     Schizophrenia Paternal Grandmother     Depression Paternal Grandmother     Early death Paternal Grandfather 54    Aneurysm Paternal Grandfather     Angioedema Neg Hx     Immunodeficiency Neg Hx     Urticaria Neg Hx     Collagen disease Neg Hx          Current Outpatient Prescriptions:     albuterol (PROAIR HFA) 90 mcg/actuation inhaler, INHALE 1 PUFF EVERY 4 HOURS PRN for Wheezing, Disp: 8.5 g, Rfl: 2    albuterol (PROVENTIL) 2.5 mg /3 mL (0.083 %) nebulizer solution, Take 3 mLs (2.5 mg total) by nebulization every 6 (six) hours as needed for Wheezing., Disp: 1 Box, Rfl: 3    aripiprazole (ABILIFY) 5 MG Tab, Take 5 mg by mouth once daily., Disp: , Rfl:     clobetasol (TEMOVATE) 0.05 % cream, Apply 1 application topically daily as needed. , Disp: , Rfl:     clobetasol (TEMOVATE) 0.05 % cream, APPLY EXTERNALLY TO THE AFFECTED AREA TWICE DAILY, Disp: 60 g, Rfl: 0    FLONASE ALLERGY RELIEF 50 mcg/actuation nasal spray, SHAKE WELL AND USE 2 SPRAYS IN EACH NOSTRIL EVERY DAY, Disp: 1 Bottle, Rfl: 11    inhalation device (BREATHERITE VALVED MDI SPACER), Use as directed for inhalation., Disp: 1 Device, Rfl: 0    levothyroxine (SYNTHROID) 88 MCG tablet, TAKE 1 TABLET BY MOUTH EVERY MORNING BEFORE BREAKFAST, Disp: 90 tablet, Rfl: 1    lisdexamfetamine (VYVANSE) 40 MG Cap, Take 40 mg by mouth once daily., Disp: , Rfl:      medroxyPROGESTERone (PROVERA) 10 MG tablet, TAKE 1 TABLET(10 MG) BY MOUTH EVERY DAY, Disp: 10 tablet, Rfl: 0    montelukast (SINGULAIR) 10 mg tablet, Take 1 tablet (10 mg total) by mouth once daily., Disp: 90 tablet, Rfl: 3    prenatal vit#103-iron-FA () 27 mg iron- 1 mg Tab, Take 1 tablet by mouth once daily., Disp: 30 tablet, Rfl: 11    sucralfate (CARAFATE) 1 gram tablet, Take 1 tablet (1 g total) by mouth 4 (four) times daily., Disp: 40 tablet, Rfl: 0    trazodone (DESYREL) 100 MG tablet, Take 1 tablet by mouth every evening., Disp: , Rfl: 0    trazodone (DESYREL) 50 MG tablet, Take 50 mg by mouth nightly as needed for Insomnia., Disp: , Rfl:     triamcinolone acetonide 0.1% (KENALOG) 0.1 % ointment, AAA bid x 2 weeks then prn, avoid chronic use, Disp: 454 g, Rfl: 1    pantoprazole (PROTONIX) 40 MG tablet, Take 1 tablet (40 mg total) by mouth once daily. Before breakfast, Disp: 30 tablet, Rfl: 6    Review of patient's allergies indicates:   Allergen Reactions    Grapefruit Anaphylaxis    Pineapple Anaphylaxis    Augmentin [amoxicillin-pot clavulanate] Hives    Penicillins Hives    Tamiflu [oseltamivir] Diarrhea and Nausea And Vomiting       Review of Systems   Constitutional: Positive for weight loss.   Respiratory:        Asthma   Musculoskeletal: Positive for joint pain.   Psychiatric/Behavioral: Positive for depression. The patient is nervous/anxious.        OBJECTIVE:  Vitals:    02/24/17 0952   BP: 119/74   Pulse: 72       PHYSICAL EXAMINATION    General:  The patient is alert and oriented x 3.  Mood is pleasant.  Observation of ears, eyes and nose reveal no gross abnormalities.  No labored breathing observed.    Right KNEE EXAMINATION     OBSERVATION / INSPECTION   Gait:   antalgic   Alignment:  Neutral   Scars:   Previous right knee scope  Muscle atrophy: Mild  Effusion:  Mild on right  Warmth:  None   Discoloration:   None    TENDERNESS / CREPITUS (T / C):          T / C      T /  C   Patella   +/ -   Lateral joint line   - / -    Peripatellar medial  -  Medial joint line    - / -    Peripatellar lateral -  Medial plica   - / -    Patellar tendon -   Popliteal fossa   - / -    Quad tendon   -   Gastrocnemius   -   Prepatellar Bursa - / -   Quadricep   -   Tibial tubercle  -  Thigh/hamstring  -   Pes anserine/HS -  Fibula    -   ITB   - / -  Tibia     -   Tib/fib joint  - / -  LCL    -     MFC   - / -   MCL: Proximal  -    LFC   - / -    Distal   -          ROM: (* = pain)  PASSIVE   ACTIVE    Left :   5 / 0 / 145   5 / 0 / 145     Right :     0 / 140   0 / 140 with pain    Patellofemoral examination:  See above noted areas of tenderness.   Patella position    Subluxation / dislocation: Centered           Sup. / Inf;   Normal   Crepitus (PF):    Absent   Patellar Mobility:       Medial-lateral:   Normal    Superior-inferior:  Normal    Inferior tilt   Normal    Patellar tendon:  Normal   Lateral tilt:    Normal   J-sign:     None   Patellofemoral grind:   No pain       MENISCAL SIGNS:     Pain on terminal extension:  +  Pain on terminal flexion:  +  Jerseys maneuver:  - for pain         LIGAMENT EXAMINATION:  ACL / Lachman:  Grade II  PCL-Post.  drawer: normal 0 to 2mm  MCL- Valgus:  normal 0 to 2mm  LCL- Varus:  normal 0 to 2mm      STRENGTH: (* = with pain) PAINFUL SIDE   Quadricep   5/5   Hamstrin/5    EXTREMITY NEURO-VASCULAR EXAMINATION:   Sensation:  Grossly intact to light touch all dermatomal regions.   Motor Function:  Fully intact motor function at hip, knee, foot and ankle    Vascular status:  DP and PT pulses 2+, brisk capillary refill, symmetric.      Xrays: no new x-rays  MRI:  1.  Full thickness tear of the anterior cruciate ligament within its midsubstance with associated kissing contusions of the anterior weight-bearing lateral femoral condyle and posterolateral tibial plateau.    2.  Peripheral vertical tear of the posterior horn of the medial meniscus    3.   Horizontal oblique tear of the posterior horn of the lateral meniscus which extends to the superior articular surface    4.  Minimal grade I strain versus contusion of the lateral head of the gastrocnemius muscle    5.  Small knee joint effusion     ASSESSMENT:    Right Knee Pain with history of chondromalacia; injury with ACL tear; meniscus tear    PLAN:   We reviewed with Carlos today, the pathology and natural history of her diagnosis. We have discussed a variety of treatment options including medications, physical therapy and other alternative treatments. I also explained the indications, risks and benefits of surgery. After discussion, Carlos decided to proceed with surgery. The decision was made to go forward with     Right knee arthrosocopic ACL reconstruction using hamstring autograft; meniscus repair vs. Debridement  DOS: 9 March 2017; [facility]    The details of the surgical procedure were explained, including the location of probable incisions and a description of likely hardware and/or grafts to be used.  The patient understands the likely convalescence after surgery.  Also, we have thoroughly discussed the risks, benefits and alternatives to surgery, including, but not limited to, the risk of infection, joint stiffness, blood clot (including DVT and/or pulmonary embolus), neurologic and vascular injury.  It was explained that, if tissue has been repaired or reconstructed, there is a chance of failure, which may require further management.    All of the patient's questions were answered and informed consent was obtained. The patient will contact us if they have any questions or concerns in the interim.

## 2017-02-24 NOTE — TELEPHONE ENCOUNTER
----- Message from Judy Sanderson sent at 2/24/2017 11:35 AM CST -----  Contact: self  Patent wants to speak with a nurse regarding medical clearance for knee surgery. Please call back at [phone] (home)

## 2017-02-27 ENCOUNTER — LAB VISIT (OUTPATIENT)
Dept: LAB | Facility: HOSPITAL | Age: 27
End: 2017-02-27
Attending: RADIOLOGY
Payer: MEDICAID

## 2017-02-27 ENCOUNTER — OFFICE VISIT (OUTPATIENT)
Dept: FAMILY MEDICINE | Facility: CLINIC | Age: 27
End: 2017-02-27
Payer: MEDICAID

## 2017-02-27 VITALS
HEIGHT: 60 IN | RESPIRATION RATE: 12 BRPM | OXYGEN SATURATION: 98 % | WEIGHT: 167.31 LBS | HEART RATE: 59 BPM | DIASTOLIC BLOOD PRESSURE: 79 MMHG | TEMPERATURE: 98 F | BODY MASS INDEX: 32.85 KG/M2 | SYSTOLIC BLOOD PRESSURE: 119 MMHG

## 2017-02-27 DIAGNOSIS — Z01.818 PRE-OPERATIVE CLEARANCE: ICD-10-CM

## 2017-02-27 DIAGNOSIS — S83.511D RUPTURE OF ANTERIOR CRUCIATE LIGAMENT OF RIGHT KNEE, SUBSEQUENT ENCOUNTER: ICD-10-CM

## 2017-02-27 DIAGNOSIS — Z01.818 PRE-OPERATIVE CLEARANCE: Primary | ICD-10-CM

## 2017-02-27 LAB
ALBUMIN SERPL BCP-MCNC: 3.7 G/DL
ALP SERPL-CCNC: 88 U/L
ALT SERPL W/O P-5'-P-CCNC: 16 U/L
ANION GAP SERPL CALC-SCNC: 10 MMOL/L
AST SERPL-CCNC: 16 U/L
BASOPHILS # BLD AUTO: 0.01 K/UL
BASOPHILS NFR BLD: 0.1 %
BILIRUB SERPL-MCNC: 0.5 MG/DL
BUN SERPL-MCNC: 10 MG/DL
CALCIUM SERPL-MCNC: 9.4 MG/DL
CHLORIDE SERPL-SCNC: 106 MMOL/L
CO2 SERPL-SCNC: 21 MMOL/L
CREAT SERPL-MCNC: 0.8 MG/DL
DIFFERENTIAL METHOD: NORMAL
EOSINOPHIL # BLD AUTO: 0 K/UL
EOSINOPHIL NFR BLD: 0.5 %
ERYTHROCYTE [DISTWIDTH] IN BLOOD BY AUTOMATED COUNT: 14.1 %
EST. GFR  (AFRICAN AMERICAN): >60 ML/MIN/1.73 M^2
EST. GFR  (NON AFRICAN AMERICAN): >60 ML/MIN/1.73 M^2
GLUCOSE SERPL-MCNC: 76 MG/DL
HCT VFR BLD AUTO: 40.7 %
HGB BLD-MCNC: 13.5 G/DL
LYMPHOCYTES # BLD AUTO: 2.4 K/UL
LYMPHOCYTES NFR BLD: 32.4 %
MCH RBC QN AUTO: 29.6 PG
MCHC RBC AUTO-ENTMCNC: 33.2 %
MCV RBC AUTO: 89 FL
MONOCYTES # BLD AUTO: 0.4 K/UL
MONOCYTES NFR BLD: 5.1 %
NEUTROPHILS # BLD AUTO: 4.5 K/UL
NEUTROPHILS NFR BLD: 61.8 %
PLATELET # BLD AUTO: 276 K/UL
PMV BLD AUTO: 10.8 FL
POTASSIUM SERPL-SCNC: 4.2 MMOL/L
PROT SERPL-MCNC: 7.7 G/DL
RBC # BLD AUTO: 4.56 M/UL
SODIUM SERPL-SCNC: 137 MMOL/L
WBC # BLD AUTO: 7.32 K/UL

## 2017-02-27 PROCEDURE — 36415 COLL VENOUS BLD VENIPUNCTURE: CPT | Mod: PO

## 2017-02-27 PROCEDURE — 80053 COMPREHEN METABOLIC PANEL: CPT

## 2017-02-27 PROCEDURE — 99395 PREV VISIT EST AGE 18-39: CPT | Mod: S$GLB,,, | Performed by: NURSE PRACTITIONER

## 2017-02-27 PROCEDURE — 85025 COMPLETE CBC W/AUTO DIFF WBC: CPT

## 2017-02-27 NOTE — MR AVS SNAPSHOT
[facility]  [address] [address] [address]  [facility] [address]  Phone: [phone]  Fax: [phone]                  Carlos Coulter   2017 11:00 AM   Office Visit    Description:  Female : 1990   Provider:  Izzy Toney NP   Department:  [facility]           Reason for Visit     Surgical clearance           Diagnoses this Visit        Comments    Pre-operative clearance    -  Primary            To Do List           Future Appointments        Provider Department Dept Phone    3/7/2017 2:00 PM RUTHIE Garza - Outpatient Rehab [phone]    3/24/2017 8:45 AM [facility] [phone]    2017 8:15 AM Kalen Kaufman MD [facility] [phone]      Goals (5 Years of Data)              3/31/16    11/30/15    11/18/15    BMI is less than 25   Not on track  Not on track  Not on track      [facility] On [facility]     [facility] On Call Nurse Care Line -  Assistance  Registered nurses in the [facility] On Call Center provide clinical advisement, health education, appointment booking, and other advisory services.  Call for this free service at [phone].             Medications           Message regarding Medications     Verify the changes and/or additions to your medication regime listed below are the same as discussed with your clinician today.  If any of these changes or additions are incorrect, please notify your healthcare provider.             Verify that the below list of medications is an accurate representation of the medications you are currently taking.  If none reported, the list may be blank. If incorrect, please contact your healthcare provider. Carry this list with you in case of emergency.           Current Medications     albuterol (PROAIR HFA) 90 mcg/actuation inhaler INHALE 1 PUFF EVERY 4 HOURS PRN for Wheezing    albuterol (PROVENTIL) 2.5 mg /3 mL (0.083 %)  nebulizer solution Take 3 mLs (2.5 mg total) by nebulization every 6 (six) hours as needed for Wheezing.    aripiprazole (ABILIFY) 5 MG Tab Take 5 mg by mouth once daily.    clobetasol (TEMOVATE) 0.05 % cream Apply 1 application topically daily as needed.     clobetasol (TEMOVATE) 0.05 % cream APPLY EXTERNALLY TO THE AFFECTED AREA TWICE DAILY    docusate sodium (COLACE) 100 MG capsule Take 1 capsule (100 mg total) by mouth 2 (two) times daily.    FLONASE ALLERGY RELIEF 50 mcg/actuation nasal spray SHAKE WELL AND USE 2 SPRAYS IN EACH NOSTRIL EVERY DAY    inhalation device (BREATHERITE VALVED MDI SPACER) Use as directed for inhalation.    levothyroxine (SYNTHROID) 88 MCG tablet TAKE 1 TABLET BY MOUTH EVERY MORNING BEFORE BREAKFAST    lisdexamfetamine (VYVANSE) 40 MG Cap Take 40 mg by mouth once daily.    medroxyPROGESTERone (PROVERA) 10 MG tablet TAKE 1 TABLET(10 MG) BY MOUTH EVERY DAY    montelukast (SINGULAIR) 10 mg tablet Take 1 tablet (10 mg total) by mouth once daily.    oxycodone-acetaminophen (PERCOCET) 5-325 mg per tablet Take 1 tablet by mouth every 8 (eight) hours as needed for Pain.    sucralfate (CARAFATE) 1 gram tablet Take 1 tablet (1 g total) by mouth 4 (four) times daily.    trazodone (DESYREL) 100 MG tablet Take 1 tablet by mouth every evening.    triamcinolone acetonide 0.1% (KENALOG) 0.1 % ointment AAA bid x 2 weeks then prn, avoid chronic use    pantoprazole (PROTONIX) 40 MG tablet Take 1 tablet (40 mg total) by mouth once daily. Before breakfast    prenatal vit#103-iron-FA () 27 mg iron- 1 mg Tab Take 1 tablet by mouth once daily.    promethazine (PHENERGAN) 25 MG tablet Take 1 tablet (25 mg total) by mouth every 6 (six) hours as needed for Nausea.           Clinical Reference Information           Your Vitals Were     BP                   119/79 (BP Location: Left arm, Patient Position: Sitting, BP Method: Manual)           Blood Pressure          Most Recent Value    BP  119/79       Allergies as of 2/27/2017     Grapefruit    Pineapple    Augmentin [Amoxicillin-pot Clavulanate]    Penicillins    Tamiflu [Oseltamivir]      Immunizations Administered on Date of Encounter - 2/27/2017     None      Orders Placed During Today's Visit     Future Labs/Procedures Expected by Expires    CBC auto differential  2/27/2017 4/28/2018    Comprehensive metabolic panel  2/27/2017 4/28/2018      Language Assistance Services     ATTENTION: Language assistance services are available, free of charge. Please call [phone].      ATENCIÓN: Si habla español, tiene a shea disposición servicios gratuitos de asistencia lingüística. Llame al [phone].     CHÚ Ý: N?u b?n nói Ti?ng Vi?t, có các d?ch v? h? tr? ngôn ng? mi?n phí dành cho b?n. G?i s? [phone].         [facility] complies with applicable Federal civil rights laws and does not discriminate on the basis of race, color, national origin, age, disability, or sex.

## 2017-02-28 NOTE — PROGRESS NOTES
Subjective:       Patient ID: Carlos Coulter is a 26 y.o. female.    Chief Complaint: Surgical clearance    HPI States that she is here for Preoperative clearance. She injured her right knee and tore her ACL. She is scheduled to have surgery on March 9th by Dr. Kaufman. She has had surgery in the past without complications. She denies any concerns. See ROS.    The following portion of the patients history was reviewed and updated as appropriate: allergies, current medications, past medical and surgical history. Past social history and problem list reviewed. Family PMH and Past social history reviewed. Tobacco, Illicit drug use reviewed.     Review of Systems    Constitutional: No fatigue or fever    HENT: no sore throat or nasal congestion. No voice changes    Eyes: No vision changes, blurred vision  Skin: no rashes or lesions  Respiratory:   No SOB, Wheezing, cough  Cardiovascular:   No CP, Palpitations  Gastrointestinal:   No N/V/D. No abdominal pain, weight stable. Appetite good.   Genitourinary:   No dysuria, urgency or frequency. No change in bowels. No blood in stools.   Musculoskeletal:   Right knee pain.  No change in gait or coordination. .  Neurological:   No dizziness. No headaches  Hematological: No abnormal bruising or bleeding      Objective:     /79 (BP Location: Left arm, Patient Position: Sitting, BP Method: Manual)  Pulse (!) 59  Temp 98 °F (36.7 °C) (Oral)   Resp 12  Ht 5' (1.524 m)  Wt 75.9 kg (167 lb 5.3 oz)  LMP 01/18/2017  SpO2 98%  BMI 32.68 kg/m2     Physical Exam    Constitutional: oriented to person, place, and time. well-developed and well-nourished.   HENT: throat clear. Nares patent.   Head: Normocephalic.   Eyes: Conjunctivae are normal. Pupils are equal, round, and reactive to light.   Neck: Normal range of motion. Neck supple. No tracheal deviation present. No thyromegaly present.   Cardiovascular: Normal rate, regular rhythm and normal heart sounds.     Pulmonary/Chest: Effort normal and breath sounds normal. No respiratory distress. No wheezes.   Abdominal: Soft. Bowel sounds are normal. No distension. There is no tenderness.   Musculoskeletal: Normal range of motion. She is wearing a brace on her right knee.   Neurological: oriented to person, place, and time.   Skin: Skin is warm and dry. No rashes or lesions  Psychiatric: Normal mood and affect.Behavior is normal. Judgment and thought content normal.   Assessment:       1. Pre-operative clearance    2. Rupture of anterior cruciate ligament of right knee, subsequent encounter        Plan:         Carlos was seen today for surgical clearance.    Diagnoses and all orders for this visit:    Pre-operative clearance: SHE IS MEDICALLY CLEARED FOR SURGERY  -     CBC auto differential; Future  -     Comprehensive metabolic panel; Future    Rupture of anterior cruciate ligament of right knee, subsequent encounter    Labs Reviewed. Normal results.     Continue current medication  Take medications only as prescribed  Healthy diet, exercise  Adequate rest  Adequate hydration  Avoid allergens  Avoid excessive caffeine

## 2017-03-06 ENCOUNTER — ANESTHESIA EVENT (OUTPATIENT)
Dept: SURGERY | Facility: HOSPITAL | Age: 27
End: 2017-03-06
Payer: MEDICAID

## 2017-03-09 ENCOUNTER — HOSPITAL ENCOUNTER (OUTPATIENT)
Dept: RADIOLOGY | Facility: HOSPITAL | Age: 27
Discharge: HOME | End: 2017-03-09
Attending: ORTHOPAEDIC SURGERY | Admitting: ORTHOPAEDIC SURGERY
Payer: MEDICAID

## 2017-03-09 ENCOUNTER — HOSPITAL ENCOUNTER (OUTPATIENT)
Facility: HOSPITAL | Age: 27
Discharge: HOME | End: 2017-03-09
Attending: ORTHOPAEDIC SURGERY | Admitting: ORTHOPAEDIC SURGERY
Payer: MEDICAID

## 2017-03-09 ENCOUNTER — ANESTHESIA (OUTPATIENT)
Dept: SURGERY | Facility: HOSPITAL | Age: 27
End: 2017-03-09
Payer: MEDICAID

## 2017-03-09 VITALS
TEMPERATURE: 98 F | HEIGHT: 60 IN | RESPIRATION RATE: 16 BRPM | WEIGHT: 155 LBS | SYSTOLIC BLOOD PRESSURE: 111 MMHG | BODY MASS INDEX: 30.43 KG/M2 | DIASTOLIC BLOOD PRESSURE: 65 MMHG | HEART RATE: 72 BPM | OXYGEN SATURATION: 97 %

## 2017-03-09 DIAGNOSIS — S83.511D NEW ACL TEAR, RIGHT, SUBSEQUENT ENCOUNTER: ICD-10-CM

## 2017-03-09 DIAGNOSIS — S83.511D NEW ACL TEAR, RIGHT, SUBSEQUENT ENCOUNTER: Primary | ICD-10-CM

## 2017-03-09 DIAGNOSIS — M25.561 ACUTE PAIN OF RIGHT KNEE: ICD-10-CM

## 2017-03-09 DIAGNOSIS — S83.241A ACUTE MEDIAL MENISCUS TEAR, RIGHT, INITIAL ENCOUNTER: ICD-10-CM

## 2017-03-09 DIAGNOSIS — M94.261 CHONDROMALACIA OF KNEE, RIGHT: ICD-10-CM

## 2017-03-09 DIAGNOSIS — M25.461 KNEE EFFUSION, RIGHT: ICD-10-CM

## 2017-03-09 DIAGNOSIS — S83.271D COMPLEX TEAR OF LATERAL MENISCUS OF RIGHT KNEE AS CURRENT INJURY, SUBSEQUENT ENCOUNTER: ICD-10-CM

## 2017-03-09 PROBLEM — S83.271A COMPLEX TEAR OF LATERAL MENISCUS OF RIGHT KNEE AS CURRENT INJURY: Status: ACTIVE | Noted: 2017-03-09

## 2017-03-09 PROBLEM — S83.249A ACUTE MEDIAL MENISCUS TEAR: Status: ACTIVE | Noted: 2017-03-09

## 2017-03-09 PROBLEM — S83.519A NEW ACL TEAR: Status: ACTIVE | Noted: 2017-03-09

## 2017-03-09 LAB
B-HCG UR QL: NEGATIVE
CTP QC/QA: YES

## 2017-03-09 PROCEDURE — 64448 NJX AA&/STRD FEM NRV NFS IMG: CPT | Mod: 59,RT,, | Performed by: ANESTHESIOLOGY

## 2017-03-09 PROCEDURE — 76942 ECHO GUIDE FOR BIOPSY: CPT | Mod: PO | Performed by: ANESTHESIOLOGY

## 2017-03-09 PROCEDURE — D9220A PRA ANESTHESIA: Mod: CRNA,,, | Performed by: NURSE ANESTHETIST, CERTIFIED REGISTERED

## 2017-03-09 PROCEDURE — 29883 ARTHRS KNE SRG MNISC RPR M&L: CPT | Mod: 51,RT,, | Performed by: ORTHOPAEDIC SURGERY

## 2017-03-09 PROCEDURE — 76942 ECHO GUIDE FOR BIOPSY: CPT | Mod: 26,,, | Performed by: ANESTHESIOLOGY

## 2017-03-09 PROCEDURE — D9220A PRA ANESTHESIA: Mod: ANES,,, | Performed by: ANESTHESIOLOGY

## 2017-03-09 PROCEDURE — 25000003 PHARM REV CODE 250: Mod: PO | Performed by: ANESTHESIOLOGY

## 2017-03-09 PROCEDURE — 27201423 OPTIME MED/SURG SUP & DEVICES STERILE SUPPLY: Mod: PO | Performed by: ORTHOPAEDIC SURGERY

## 2017-03-09 PROCEDURE — 25000003 PHARM REV CODE 250: Mod: PO | Performed by: ORTHOPAEDIC SURGERY

## 2017-03-09 PROCEDURE — 25000003 PHARM REV CODE 250: Mod: PO | Performed by: NURSE ANESTHETIST, CERTIFIED REGISTERED

## 2017-03-09 PROCEDURE — 29888 ARTHRS AID ACL RPR/AGMNTJ: CPT | Mod: RT,,, | Performed by: ORTHOPAEDIC SURGERY

## 2017-03-09 PROCEDURE — 63600175 PHARM REV CODE 636 W HCPCS: Mod: PO | Performed by: ORTHOPAEDIC SURGERY

## 2017-03-09 PROCEDURE — 37000009 HC ANESTHESIA EA ADD 15 MINS: Mod: PO | Performed by: ORTHOPAEDIC SURGERY

## 2017-03-09 PROCEDURE — 71000033 HC RECOVERY, INTIAL HOUR: Mod: PO | Performed by: ORTHOPAEDIC SURGERY

## 2017-03-09 PROCEDURE — 71000039 HC RECOVERY, EACH ADD'L HOUR: Mod: PO | Performed by: ORTHOPAEDIC SURGERY

## 2017-03-09 PROCEDURE — 37000008 HC ANESTHESIA 1ST 15 MINUTES: Mod: PO | Performed by: ORTHOPAEDIC SURGERY

## 2017-03-09 PROCEDURE — 81025 URINE PREGNANCY TEST: CPT | Mod: PO | Performed by: ORTHOPAEDIC SURGERY

## 2017-03-09 PROCEDURE — 27200664 HC NERVE BLOCK COMPLETE KIT: Mod: PO | Performed by: ANESTHESIOLOGY

## 2017-03-09 PROCEDURE — 36000710: Mod: PO | Performed by: ORTHOPAEDIC SURGERY

## 2017-03-09 PROCEDURE — 63600175 PHARM REV CODE 636 W HCPCS: Mod: PO | Performed by: ANESTHESIOLOGY

## 2017-03-09 PROCEDURE — 63600175 PHARM REV CODE 636 W HCPCS: Mod: PO | Performed by: NURSE ANESTHETIST, CERTIFIED REGISTERED

## 2017-03-09 PROCEDURE — 27800903 OPTIME MED/SURG SUP & DEVICES OTHER IMPLANTS: Mod: PO | Performed by: ORTHOPAEDIC SURGERY

## 2017-03-09 PROCEDURE — C1713 ANCHOR/SCREW BN/BN,TIS/BN: HCPCS | Mod: PO | Performed by: ORTHOPAEDIC SURGERY

## 2017-03-09 PROCEDURE — 36000711: Mod: PO | Performed by: ORTHOPAEDIC SURGERY

## 2017-03-09 DEVICE — IMPLANTABLE DEVICE: Type: IMPLANTABLE DEVICE | Site: KNEE | Status: FUNCTIONAL

## 2017-03-09 DEVICE — SCREW BIOINTRAFIX 7-9MMX30: Type: IMPLANTABLE DEVICE | Site: KNEE | Status: FUNCTIONAL

## 2017-03-09 DEVICE — PIN GUIDE GRAFT PASS XACTPIN: Type: IMPLANTABLE DEVICE | Site: KNEE | Status: FUNCTIONAL

## 2017-03-09 DEVICE — LOOP CORT RIGID FIX ADJ STD: Type: IMPLANTABLE DEVICE | Site: KNEE | Status: FUNCTIONAL

## 2017-03-09 DEVICE — TENDON GRACILIS ASEPTIC 26CM: Type: IMPLANTABLE DEVICE | Site: KNEE | Status: FUNCTIONAL

## 2017-03-09 DEVICE — SHEATH SCREW LARGE 30MM: Type: IMPLANTABLE DEVICE | Site: KNEE | Status: FUNCTIONAL

## 2017-03-09 RX ORDER — ROCURONIUM BROMIDE 10 MG/ML
INJECTION, SOLUTION INTRAVENOUS
Status: DISCONTINUED | OUTPATIENT
Start: 2017-03-09 | End: 2017-03-09

## 2017-03-09 RX ORDER — FENTANYL CITRATE 50 UG/ML
25 INJECTION, SOLUTION INTRAMUSCULAR; INTRAVENOUS EVERY 5 MIN PRN
Status: COMPLETED | OUTPATIENT
Start: 2017-03-09 | End: 2017-03-09

## 2017-03-09 RX ORDER — OXYCODONE HYDROCHLORIDE 5 MG/1
5 TABLET ORAL
Status: DISCONTINUED | OUTPATIENT
Start: 2017-03-09 | End: 2017-03-09 | Stop reason: HOSPADM

## 2017-03-09 RX ORDER — ONDANSETRON 2 MG/ML
INJECTION INTRAMUSCULAR; INTRAVENOUS
Status: DISCONTINUED | OUTPATIENT
Start: 2017-03-09 | End: 2017-03-09

## 2017-03-09 RX ORDER — ONDANSETRON 2 MG/ML
4 INJECTION INTRAMUSCULAR; INTRAVENOUS EVERY 12 HOURS PRN
Status: DISCONTINUED | OUTPATIENT
Start: 2017-03-09 | End: 2017-03-09 | Stop reason: HOSPADM

## 2017-03-09 RX ORDER — SODIUM CHLORIDE 0.9 % (FLUSH) 0.9 %
3 SYRINGE (ML) INJECTION
Status: DISCONTINUED | OUTPATIENT
Start: 2017-03-09 | End: 2017-03-09 | Stop reason: HOSPADM

## 2017-03-09 RX ORDER — HYDROMORPHONE HYDROCHLORIDE 2 MG/ML
1 INJECTION, SOLUTION INTRAMUSCULAR; INTRAVENOUS; SUBCUTANEOUS EVERY 4 HOURS PRN
Status: DISCONTINUED | OUTPATIENT
Start: 2017-03-09 | End: 2017-03-09 | Stop reason: HOSPADM

## 2017-03-09 RX ORDER — PROPOFOL 10 MG/ML
VIAL (ML) INTRAVENOUS
Status: DISCONTINUED | OUTPATIENT
Start: 2017-03-09 | End: 2017-03-09

## 2017-03-09 RX ORDER — NEOSTIGMINE METHYLSULFATE 1 MG/ML
INJECTION, SOLUTION INTRAVENOUS
Status: DISCONTINUED | OUTPATIENT
Start: 2017-03-09 | End: 2017-03-09

## 2017-03-09 RX ORDER — GLYCOPYRROLATE 0.2 MG/ML
INJECTION INTRAMUSCULAR; INTRAVENOUS
Status: DISCONTINUED | OUTPATIENT
Start: 2017-03-09 | End: 2017-03-09

## 2017-03-09 RX ORDER — LIDOCAINE HYDROCHLORIDE 10 MG/ML
1 INJECTION, SOLUTION EPIDURAL; INFILTRATION; INTRACAUDAL; PERINEURAL ONCE AS NEEDED
Status: COMPLETED | OUTPATIENT
Start: 2017-03-09 | End: 2017-03-09

## 2017-03-09 RX ORDER — LIDOCAINE HCL/PF 100 MG/5ML
SYRINGE (ML) INTRAVENOUS
Status: DISCONTINUED | OUTPATIENT
Start: 2017-03-09 | End: 2017-03-09

## 2017-03-09 RX ORDER — FENTANYL CITRATE 50 UG/ML
100 INJECTION, SOLUTION INTRAMUSCULAR; INTRAVENOUS
Status: DISCONTINUED | OUTPATIENT
Start: 2017-03-09 | End: 2017-03-09 | Stop reason: HOSPADM

## 2017-03-09 RX ORDER — MIDAZOLAM HYDROCHLORIDE 5 MG/ML
2 INJECTION INTRAMUSCULAR; INTRAVENOUS ONCE
Status: COMPLETED | OUTPATIENT
Start: 2017-03-09 | End: 2017-03-09

## 2017-03-09 RX ORDER — DEXAMETHASONE SODIUM PHOSPHATE 4 MG/ML
8 INJECTION, SOLUTION INTRA-ARTICULAR; INTRALESIONAL; INTRAMUSCULAR; INTRAVENOUS; SOFT TISSUE
Status: COMPLETED | OUTPATIENT
Start: 2017-03-09 | End: 2017-03-09

## 2017-03-09 RX ORDER — KETAMINE HYDROCHLORIDE 100 MG/ML
INJECTION, SOLUTION INTRAMUSCULAR; INTRAVENOUS
Status: DISCONTINUED | OUTPATIENT
Start: 2017-03-09 | End: 2017-03-09

## 2017-03-09 RX ORDER — SODIUM CHLORIDE, SODIUM LACTATE, POTASSIUM CHLORIDE, CALCIUM CHLORIDE 600; 310; 30; 20 MG/100ML; MG/100ML; MG/100ML; MG/100ML
INJECTION, SOLUTION INTRAVENOUS CONTINUOUS
Status: DISCONTINUED | OUTPATIENT
Start: 2017-03-09 | End: 2017-03-09 | Stop reason: HOSPADM

## 2017-03-09 RX ORDER — HYDROCODONE BITARTRATE AND ACETAMINOPHEN 5; 325 MG/1; MG/1
1 TABLET ORAL EVERY 4 HOURS PRN
Status: DISCONTINUED | OUTPATIENT
Start: 2017-03-09 | End: 2017-03-09 | Stop reason: HOSPADM

## 2017-03-09 RX ORDER — ACETAMINOPHEN 10 MG/ML
INJECTION, SOLUTION INTRAVENOUS
Status: DISCONTINUED | OUTPATIENT
Start: 2017-03-09 | End: 2017-03-09

## 2017-03-09 RX ORDER — EPINEPHRINE 1 MG/ML
INJECTION, SOLUTION INTRACARDIAC; INTRAMUSCULAR; INTRAVENOUS; SUBCUTANEOUS
Status: DISCONTINUED | OUTPATIENT
Start: 2017-03-09 | End: 2017-03-09 | Stop reason: HOSPADM

## 2017-03-09 RX ADMIN — LIDOCAINE HYDROCHLORIDE: 10 INJECTION, SOLUTION EPIDURAL; INFILTRATION; INTRACAUDAL; PERINEURAL at 06:03

## 2017-03-09 RX ADMIN — FENTANYL CITRATE 50 MCG: 50 INJECTION, SOLUTION INTRAMUSCULAR; INTRAVENOUS at 06:03

## 2017-03-09 RX ADMIN — ACETAMINOPHEN 1000 MG: 10 INJECTION, SOLUTION INTRAVENOUS at 07:03

## 2017-03-09 RX ADMIN — LIDOCAINE HYDROCHLORIDE 75 MG: 20 INJECTION PARENTERAL at 07:03

## 2017-03-09 RX ADMIN — ROCURONIUM BROMIDE 10 MG: 10 INJECTION, SOLUTION INTRAVENOUS at 09:03

## 2017-03-09 RX ADMIN — OXYCODONE HYDROCHLORIDE 5 MG: 5 TABLET ORAL at 12:03

## 2017-03-09 RX ADMIN — NEOSTIGMINE METHYLSULFATE 5 MG: 1 INJECTION INTRAVENOUS at 11:03

## 2017-03-09 RX ADMIN — PROPOFOL 150 MG: 10 INJECTION, EMULSION INTRAVENOUS at 07:03

## 2017-03-09 RX ADMIN — ROPIVACAINE HYDROCHLORIDE: 2 INJECTION, SOLUTION EPIDURAL; INFILTRATION at 12:03

## 2017-03-09 RX ADMIN — ROCURONIUM BROMIDE 20 MG: 10 INJECTION, SOLUTION INTRAVENOUS at 08:03

## 2017-03-09 RX ADMIN — ROCURONIUM BROMIDE 30 MG: 10 INJECTION, SOLUTION INTRAVENOUS at 07:03

## 2017-03-09 RX ADMIN — MIDAZOLAM HYDROCHLORIDE 2 MG: 5 INJECTION, SOLUTION INTRAMUSCULAR; INTRAVENOUS at 06:03

## 2017-03-09 RX ADMIN — DEXTROSE 900 MG: 50 INJECTION, SOLUTION INTRAVENOUS at 06:03

## 2017-03-09 RX ADMIN — SODIUM CHLORIDE, SODIUM LACTATE, POTASSIUM CHLORIDE, AND CALCIUM CHLORIDE: .6; .31; .03; .02 INJECTION, SOLUTION INTRAVENOUS at 10:03

## 2017-03-09 RX ADMIN — FENTANYL CITRATE 25 MCG: 50 INJECTION INTRAMUSCULAR; INTRAVENOUS at 12:03

## 2017-03-09 RX ADMIN — GLYCOPYRROLATE 0.4 MG: 0.2 INJECTION, SOLUTION INTRAMUSCULAR; INTRAVENOUS at 11:03

## 2017-03-09 RX ADMIN — SODIUM CHLORIDE, SODIUM LACTATE, POTASSIUM CHLORIDE, AND CALCIUM CHLORIDE: .6; .31; .03; .02 INJECTION, SOLUTION INTRAVENOUS at 06:03

## 2017-03-09 RX ADMIN — ONDANSETRON 4 MG: 2 INJECTION, SOLUTION INTRAMUSCULAR; INTRAVENOUS at 07:03

## 2017-03-09 RX ADMIN — DEXAMETHASONE SODIUM PHOSPHATE 8 MG: 4 INJECTION, SOLUTION INTRAMUSCULAR; INTRAVENOUS at 06:03

## 2017-03-09 RX ADMIN — GLYCOPYRROLATE 0.2 MG: 0.2 INJECTION, SOLUTION INTRAMUSCULAR; INTRAVENOUS at 07:03

## 2017-03-09 RX ADMIN — KETAMINE HYDROCHLORIDE 25 MG: 100 INJECTION, SOLUTION, CONCENTRATE INTRAMUSCULAR; INTRAVENOUS at 07:03

## 2017-03-09 NOTE — BRIEF OP NOTE
[facility]  Brief Operative Note     SUMMARY     Surgery Date: 3/9/2017     Surgeon(s) and Role:     * Kalen Kaufman MD - Primary    Assistant: HALEY Mohan    Pre-op Diagnosis:  Knee effusion, right [M25.461]  New ACL tear, right, subsequent encounter [S83.511D]  Acute medial meniscus tear, right, initial encounter [S83.241A]  Lateral meniscus tear  Chondromalacia of knee, right [M94.261]  Acute pain of right knee [M25.561]    Post-op Diagnosis:  Knee effusion, right [M25.461]  New ACL tear, right, subsequent encounter [S83.511D]  Acute medial meniscus tear, right, initial encounter [S83.241A]  Lateral meniscus tear (posterior horn and root)  Chondromalacia of knee, right [M94.261]  Acute pain of right knee [M25.561]    Procedure(s) (LRB):  RECONSTRUCTION, ACL WITH ALLOGRAFT & AUTOGRAFT,HAMSTRING (Right)  REPAIR-MENISCUS MEDIAL & LATERAL (Right)  Lateral and medial meniscus repair    Anesthesia: General and right lower extremity regional with catheter    Description of the findings of the procedure: Lateral posterior horn and meniscal root tear; medial posterior horn and body tear; acl rupture; patellar chondromalacia    Findings/Key Components: see operative note    TT Time: 26 mins at 300mmHg    Estimated Blood Loss: 20cc         Specimens:   Specimen     None          Discharge Note    SUMMARY     Admit Date: 3/9/2017    Discharge Date and Time: No discharge date for patient encounter.    Attending Physician: Kalen Kaufman MD     Discharge Provider: Kalen Kaufman    Final Diagnosis :Knee effusion, right [M25.461]  New ACL tear, right, subsequent encounter [S83.511D]  Acute medial meniscus tear, right, initial encounter [S83.241A]  Lateral meniscus tear  Chondromalacia of knee, right [M94.261]  Acute pain of right knee [M25.561]    Outcome of Hospitalization, Treatment, Procedure, or Surgery:  Patient was admitted for an outpatient procedure and tolerated it well without  complications.    Disposition: Home or Self care    Follow Up/Patient Instructions: The patient will be discharged home after meeting all discharge criteria. The patient will resume a diet as tolerated. Please follow post-operative instructions for activity restrictions. Keep previously planned post-operative follow -up appointment.      Medications:  Reconciled Home Medications:   Current Discharge Medication List      CONTINUE these medications which have NOT CHANGED    Details   albuterol (PROAIR HFA) 90 mcg/actuation inhaler INHALE 1 PUFF EVERY 4 HOURS PRN for Wheezing  Qty: 8.5 g, Refills: 2      albuterol (PROVENTIL) 2.5 mg /3 mL (0.083 %) nebulizer solution Take 3 mLs (2.5 mg total) by nebulization every 6 (six) hours as needed for Wheezing.  Qty: 1 Box, Refills: 3      aripiprazole (ABILIFY) 5 MG Tab Take 5 mg by mouth once daily.      !! clobetasol (TEMOVATE) 0.05 % cream Apply 1 application topically daily as needed.       !! clobetasol (TEMOVATE) 0.05 % cream APPLY EXTERNALLY TO THE AFFECTED AREA TWICE DAILY  Qty: 60 g, Refills: 0      docusate sodium (COLACE) 100 MG capsule Take 1 capsule (100 mg total) by mouth 2 (two) times daily.  Qty: 60 capsule, Refills: 0    Associated Diagnoses: Knee effusion, right; New ACL tear, right, subsequent encounter; Acute pain of right knee; Chondromalacia of knee, right; Post-op pain; Acute medial meniscus tear, right, initial encounter      FLONASE ALLERGY RELIEF 50 mcg/actuation nasal spray SHAKE WELL AND USE 2 SPRAYS IN EACH NOSTRIL EVERY DAY  Qty: 1 Bottle, Refills: 11      levothyroxine (SYNTHROID) 88 MCG tablet TAKE 1 TABLET BY MOUTH EVERY MORNING BEFORE BREAKFAST  Qty: 90 tablet, Refills: 1      lisdexamfetamine (VYVANSE) 40 MG Cap Take 40 mg by mouth once daily.      medroxyPROGESTERone (PROVERA) 10 MG tablet TAKE 1 TABLET(10 MG) BY MOUTH EVERY DAY  Qty: 10 tablet, Refills: 0    Associated Diagnoses: Irregular periods      montelukast (SINGULAIR) 10 mg tablet  Take 1 tablet (10 mg total) by mouth once daily.  Qty: 90 tablet, Refills: 3      oxycodone-acetaminophen (PERCOCET) 5-325 mg per tablet Take 1 tablet by mouth every 8 (eight) hours as needed for Pain.  Qty: 90 tablet, Refills: 0    Associated Diagnoses: Knee effusion, right; New ACL tear, right, subsequent encounter; Acute pain of right knee; Chondromalacia of knee, right; Post-op pain; Acute medial meniscus tear, right, initial encounter      sucralfate (CARAFATE) 1 gram tablet Take 1 tablet (1 g total) by mouth 4 (four) times daily.  Qty: 40 tablet, Refills: 0      trazodone (DESYREL) 100 MG tablet Take 1 tablet by mouth every evening.  Refills: 0      triamcinolone acetonide 0.1% (KENALOG) 0.1 % ointment AAA bid x 2 weeks then prn, avoid chronic use  Qty: 454 g, Refills: 1    Associated Diagnoses: Atopic dermatitis      inhalation device (BREATHERITE VALVED MDI SPACER) Use as directed for inhalation.  Qty: 1 Device, Refills: 0      pantoprazole (PROTONIX) 40 MG tablet Take 1 tablet (40 mg total) by mouth once daily. Before breakfast  Qty: 30 tablet, Refills: 6    Associated Diagnoses: Gastroesophageal reflux disease with esophagitis; Heartburn; Pharyngoesophageal dysphagia; Nonspecific chest pain      promethazine (PHENERGAN) 25 MG tablet Take 1 tablet (25 mg total) by mouth every 6 (six) hours as needed for Nausea.  Qty: 20 tablet, Refills: 0    Associated Diagnoses: Knee effusion, right; New ACL tear, right, subsequent encounter; Acute pain of right knee; Chondromalacia of knee, right; Post-op pain; Acute medial meniscus tear, right, initial encounter       !! - Potential duplicate medications found. Please discuss with provider.          Discharge Procedure Orders  Diet general     Ice to affected area     No driving, operating heavy equipment or signing legal documents while taking pain medication     Call MD for:  temperature >100.4     Call MD for:  persistent nausea and vomiting     Call MD for:  severe  uncontrolled pain     Call MD for:  difficulty breathing, headache or visual disturbances     Call MD for:  redness, tenderness, or signs of infection (pain, swelling, redness, odor or green/yellow discharge around incision site)     Call MD for:  hives     Call MD for:  persistent dizziness or light-headedness     Call MD for:  extreme fatigue     Keep surgical extremity elevated     Wound care routine (specify)   Order Comments: Wound care routine: see post-op instructions       Follow-up Information     Follow up with Kalen Kaufman MD.    Specialties:  Sports Medicine, Orthopedic Surgery    Why:  patient has f/u appointments scheduled    Contact information:    [address]  [place] [address]  [phone]

## 2017-03-09 NOTE — INTERVAL H&P NOTE
The patient has been examined and the H&P has been reviewed:    I concur with the findings and no changes have occurred since H&P was written. The patient was seen this morning. The operative extremity (right knee) was identified by the patient and initialed by an operating surgeon. There have been no changes in the patient's health since their last office visit. The patient's questions were all answered and we will proceed to the O.R.      Anesthesia/Surgery risks, benefits and alternative options discussed and understood by patient/family.          Active Hospital Problems    Diagnosis  POA    New ACL tear [S83.519A]  Yes      Resolved Hospital Problems    Diagnosis Date Resolved POA   No resolved problems to display.

## 2017-03-09 NOTE — IP AVS SNAPSHOT
[facility]  [address]  [place] [address]  Phone: [phone]           Patient Discharge Instructions     Our goal is to set you up for success. This packet includes information on your condition, medications, and your home care. It will help you to care for yourself so you don't get sicker and need to go back to the hospital.     Please ask your nurse if you have any questions.        There are many details to remember when preparing to leave the hospital. Here is what you will need to do:    1. Take your medicine. If you are prescribed medications, review your Medication List in the following pages. You may have new medications to  at the pharmacy and others that you'll need to stop taking. Review the instructions for how and when to take your medications. Talk with your doctor or nurses if you are unsure of what to do.     2. Go to your follow-up appointments. Specific follow-up information is listed in the following pages. Your may be contacted by a transition nurse or clinical provider about future appointments. Be sure we have all of the phone numbers to reach you, if needed. Please contact your provider's office if you are unable to make an appointment.     3. Watch for warning signs. Your doctor or nurse will give you detailed warning signs to watch for and when to call for assistance. These instructions may also include educational information about your condition. If you experience any of warning signs to your health, call your doctor.               [facility]  Unless otherwise directed by your provider, please contact [facility], our nurse care line that is available for 24/7 assistance.     [phone] (toll-free)    Registered nurses in the [facility] provide clinical advisement, health education, appointment booking, and other advisory services.                    ** Verify the list of medication(s) below is accurate and up  to date. Carry this with you in case of emergency. If your medications have changed, please notify your healthcare provider.             Medication List      CONTINUE taking these medications        Additional Info                      ABILIFY 5 MG Tab   Refills:  0   Dose:  5 mg   Generic drug:  aripiprazole    Instructions:  Take 5 mg by mouth once daily.     Begin Date    AM    Noon    PM    Bedtime       * albuterol 2.5 mg /3 mL (0.083 %) nebulizer solution   Commonly known as:  PROVENTIL   Quantity:  1 Box   Refills:  3   Dose:  2.5 mg    Instructions:  Take 3 mLs (2.5 mg total) by nebulization every 6 (six) hours as needed for Wheezing.     Begin Date    AM    Noon    PM    Bedtime       * albuterol 90 mcg/actuation inhaler   Commonly known as:  PROAIR HFA   Quantity:  8.5 g   Refills:  2    Instructions:  INHALE 1 PUFF EVERY 4 HOURS PRN for Wheezing     Begin Date    AM    Noon    PM    Bedtime       * clobetasol 0.05 % cream   Commonly known as:  TEMOVATE   Refills:  0   Dose:  1 application    Instructions:  Apply 1 application topically daily as needed.     Begin Date    AM    Noon    PM    Bedtime       * clobetasol 0.05 % cream   Commonly known as:  TEMOVATE   Quantity:  60 g   Refills:  0    Instructions:  APPLY EXTERNALLY TO THE AFFECTED AREA TWICE DAILY     Begin Date    AM    Noon    PM    Bedtime       docusate sodium 100 MG capsule   Commonly known as:  COLACE   Quantity:  60 capsule   Refills:  0   Dose:  100 mg    Instructions:  Take 1 capsule (100 mg total) by mouth 2 (two) times daily.     Begin Date    AM    Noon    PM    Bedtime       FLONASE ALLERGY RELIEF 50 mcg/actuation nasal spray   Quantity:  1 Bottle   Refills:  11   Generic drug:  fluticasone    Instructions:  SHAKE WELL AND USE 2 SPRAYS IN EACH NOSTRIL EVERY DAY     Begin Date    AM    Noon    PM    Bedtime       inhalation device   Commonly known as:  BREATHERITE VALVED MDI SPACER   Quantity:  1 Device   Refills:  0    Instructions:   Use as directed for inhalation.     Begin Date    AM    Noon    PM    Bedtime       levothyroxine 88 MCG tablet   Commonly known as:  SYNTHROID   Quantity:  90 tablet   Refills:  1    Instructions:  TAKE 1 TABLET BY MOUTH EVERY MORNING BEFORE BREAKFAST     Begin Date    AM    Noon    PM    Bedtime       lisdexamfetamine 40 MG Cap   Commonly known as:  VYVANSE   Refills:  0   Dose:  40 mg    Instructions:  Take 40 mg by mouth once daily.     Begin Date    AM    Noon    PM    Bedtime       medroxyPROGESTERone 10 MG tablet   Commonly known as:  PROVERA   Quantity:  10 tablet   Refills:  0    Instructions:  TAKE 1 TABLET(10 MG) BY MOUTH EVERY DAY     Begin Date    AM    Noon    PM    Bedtime       montelukast 10 mg tablet   Commonly known as:  SINGULAIR   Quantity:  90 tablet   Refills:  3   Dose:  10 mg    Instructions:  Take 1 tablet (10 mg total) by mouth once daily.     Begin Date    AM    Noon    PM    Bedtime       oxycodone-acetaminophen 5-325 mg per tablet   Commonly known as:  PERCOCET   Quantity:  90 tablet   Refills:  0   Dose:  1 tablet    Instructions:  Take 1 tablet by mouth every 8 (eight) hours as needed for Pain.     Begin Date    AM    Noon    PM    Bedtime       pantoprazole 40 MG tablet   Commonly known as:  PROTONIX   Quantity:  30 tablet   Refills:  6   Dose:  40 mg    Instructions:  Take 1 tablet (40 mg total) by mouth once daily. Before breakfast     Begin Date    AM    Noon    PM    Bedtime       promethazine 25 MG tablet   Commonly known as:  PHENERGAN   Quantity:  20 tablet   Refills:  0   Dose:  25 mg    Instructions:  Take 1 tablet (25 mg total) by mouth every 6 (six) hours as needed for Nausea.     Begin Date    AM    Noon    PM    Bedtime       sucralfate 1 gram tablet   Commonly known as:  CARAFATE   Quantity:  40 tablet   Refills:  0   Dose:  1 g    Instructions:  Take 1 tablet (1 g total) by mouth 4 (four) times daily.     Begin Date    AM    Noon    PM    Bedtime       trazodone 100  MG tablet   Commonly known as:  DESYREL   Refills:  0   Dose:  1 tablet    Instructions:  Take 1 tablet by mouth every evening.     Begin Date    AM    Noon    PM    Bedtime       triamcinolone acetonide 0.1% 0.1 % ointment   Commonly known as:  KENALOG   Quantity:  454 g   Refills:  1    Instructions:  AAA bid x 2 weeks then prn, avoid chronic use     Begin Date    AM    Noon    PM    Bedtime       * Notice:  This list has 4 medication(s) that are the same as other medications prescribed for you. Read the directions carefully, and ask your doctor or other care provider to review them with you.               Please bring to all follow up appointments:    1. A copy of your discharge instructions.  2. All medicines you are currently taking in their original bottles.  3. Identification and insurance card.    Please arrive 15 minutes ahead of scheduled appointment time.    Please call 24 hours in advance if you must reschedule your appointment and/or time.        Your Scheduled Appointments     Mar 10, 2017 10:00 AM CST   New Physical Therapy Patient with [facility]    [address]  [facility] [address]   [phone]            Mar 24, 2017  8:45 AM CDT   Post OP with Kalen Kaufman MD   [place] [facility]    [address] [place] [address]   [phone]            Apr 07, 2017  8:15 AM CDT   Post OP with Kalen Kaufman MD   [place] [facility]    [address] [place] [address]   [phone]              Follow-up Information     Follow up with Kalen Kaufman MD.    Specialties:  Sports Medicine, Orthopedic Surgery    Why:  patient has f/u appointments scheduled    Contact information:    [address] [place] [address]  [phone]          Discharge Instructions     Future Orders    Call MD for:  difficulty breathing, headache or visual disturbances     Call MD for:  extreme  fatigue     Call MD for:  hives     Call MD for:  persistent dizziness or light-headedness     Call MD for:  persistent nausea and vomiting     Call MD for:  redness, tenderness, or signs of infection (pain, swelling, redness, odor or green/yellow discharge around incision site)     Call MD for:  severe uncontrolled pain     Call MD for:  temperature >100.4     Diet general     Questions:    Total calories:      Fat restriction, if any:      Protein restriction, if any:      Na restriction, if any:      Fluid restriction:      Additional restrictions:      Keep surgical extremity elevated     No driving, operating heavy equipment or signing legal documents while taking pain medication     Wound care routine (specify)     Comments:    Wound care routine: see post-op instructions        Discharge Instructions            [address], [place]                                                                                          [phone]                   Postoperative Instructions for Knee Surgery                 Your Surgery Included:   Open               Arthroscopic   [] Ligament Repair       [x] Diagnostic           [] ACL     [] PCL     [] MCL     [] PLLC      [x] Synovectomy / Plica Removal [] Meniscal Cartilage Repair / Transplantation      [] Lysis of Adhesions / Manipulation [] Articular Cartilage Repair      [] Interval Release           [] Microfracture       [] OATS   [] ACI      [] Meniscectomy           [] Osteochondral Allograft      [x] Meniscal Cartilage Repair  [] Patellar Realignment       [x] Debridement / Chondroplasty         [] Lateral Release   [] Ligament Repair      [] Articular Cartilage Repair          [] Extensor Mechanism             []   Microfracture  []  OATS         []  Cartilage Biopsy [] Tendon Repair          [x] Ligament Reconstruction          [] Patella                  [] Quadriceps             [x]   ACL    []   PCL  [] High Tibial Osteotomy        [] PRP Arthrocentesis  [] Joint Replacement                    [] Unicompartmental   [] Patellofemoral                    [] Total Knee                  Call our office ([phone]) immediately if you experience any of the following:       Excessive bleeding or pus like drainage at the incision site       Uncontrollable pain not relieved by pain medication       Excessive swelling or redness at the incision site       Fever above 101.5 degrees not controlled with Tylenol or Motrin       Shortness of Breath       Any foul odor or blistering from the surgery site    FOR EMERGENCIES: If any unusual problems or difficulties occur, call our office at [phone], or page the  at [phone] who will direct your call appropriately    1.   Pain Management: A cold therapy cuff, pain medications, local injections, and in some cases, regional anesthesia injections are used to manage your post-operative pain. The decision to use each of these options is based on their risks and benefits.     Medications: You were given one or more of the following medication prescriptions before leaving the hospital. Have the prescriptions filled at a pharmacy on your way home and follow the instructions on the bottles. If you need a refill, please call your pharmacy.      Narcotic Medication (usually Vicodin ES, Lortab, Percocet or Nucynta): Begin taking the medication before your knee starts to hurt. Some patients do not like to take any medication, but if you wait until your pain is severe before taking, you will be very uncomfortable for several hours waiting for the narcotic to work. Always take with food.     Nausea / Vomiting: For this issue, we prescribe Phenergan, use this medication as directed.     Cold Therapy: You may have been sent home with a [facility] cold therapy unit and wrap for your knee. Fill with ice and water to the indicated fill line and use throughout the day for the first two days and then  as needed to help relieve pain and control swelling.      Regional Anesthesia Injections (Blocks): You may have been given a regional nerve block either before or after surgery. This may make your entire leg numb for 24-36 hours.                            * Proceed with caution when bearing weight on your leg.     2.   Diet: Eat a bland diet for the first day after surgery. Progress your diet as tolerated. Constipation may occur with Narcotic usage, contact our office if you are experiencing constipation.    3.   Activity: Limit your activity during the first 48 hours, keep your leg elevated with pillows under your heel. After the first 48 hours at home, increase your activity level based on your symptoms.    4.   Dressing Change: Remove the dressing on the 3rd day or your therapist may remove it at your first appointment. It is normal for some blood to be seen on the dressings. It is also normal for you to see apparent bruising on the skin around your knee when you remove the dressing. If present, leave the steri-strip tape across the incisions. If you are concerned by the drainage or the appearance of your knee, please call one of the numbers listed below.    5.   Showering: You may shower on the 3rd day after surgery if the wound is dry and clean, but do not let the wound soak in water until sutures are removed. Do not submerge in any water until after your postoperative appointment in clinic.    6.   Knee Brace: You may have been sent home in a hinged knee brace. Your brace is set at 0 to 90 degrees of motion. Wear the brace for 6 weeks, LOCKED in full extension when walking, you will need to wear this brace at all time unless instructed otherwise. You may unlock at rest or for exercises.    7.   Your procedure did not require a Continuous Passive Motion (CPM) device.    8.   Weight Bearing: You may have been sent home with crutches. If instructed (see below), use these crutches at all times unless at complete  "rest.      [] Non-weight bearing for     0 weeks (you may touch your toes to the floor)      [x] Partial TOE TOUCH weight bearing for  6 weeks    [] 25% Body Weight   [] 50% Body Weight      [x] Full weight bearing            []  NOW    [x]  after 6 weeks     9.  Knee Exercises: Begin these exercises the first day after surgery in order to help you regain your motion and strength. You may do the following marked exercises:     [x] Quad Sets - Begin activating your quadriceps muscle by driving your          knee downward into full knee extension while seated on a table or bed   with a towel rolled and propped under your heel     [x] Straight Leg Raise (SLR) - While flavia your quadriceps muscle, lift     your fully extended leg to the level of your non-operative knee (as shown)          [x] Ankle pumps - With your knee straight, move your ankle in a "pumping"    fashion to activate your calf and leg muscles      10.  Physical Therapy: Physical therapy is an essential component to your recovery from surgery. Your physical therapy will start in 1 days.  **PLEASE FOLLOW ACL HAMSTRING RECONSTRUCTION WITH MENISCUS REPAIR PROTOCOL**    FIRST POSTOPERATIVE VISIT: As scheduled.          FOLLOW PREPRINTED INSTRUCTIONS REGARDING CARE/REMOVAL OF PAIN BALL    PAIN BALL TO BE REMOVED Saturday MARCH 11 ANYTIME AFTER 2P       Discharge Instructions: After Your Surgery  Youve just had surgery. During surgery you were given medicine called anesthesia to keep you relaxed and free of pain. After surgery you may have some pain or nausea. This is common. Here are some tips for feeling better and getting well after surgery.     Stay on schedule with your medication.   Going home  Your doctor or nurse will show you how to take care of yourself when you go home. He or she will also answer your questions. Have an adult family member or friend drive you home. For the first 24 hours after your surgery:  · Do not drive or use heavy " equipment.  · Do not make important decisions or sign legal papers.  · Do not drink alcohol.  · Have someone stay with you, if needed. He or she can watch for problems and help keep you safe.  Be sure to go to all follow-up visits with your doctor. And rest after your surgery for as long as your doctor tells you to.  Coping with pain  If you have pain after surgery, pain medicine will help you feel better. Take it as told, before pain becomes severe. Also, ask your doctor or pharmacist about other ways to control pain. This might be with heat, ice, or relaxation. And follow any other instructions your surgeon or nurse gives you.  Tips for taking pain medicine  To get the best relief possible, remember these points:  · Pain medicines can upset your stomach. Taking them with a little food may help.  · Most pain relievers taken by mouth need at least 20 to 30 minutes to start to work.  · Taking medicine on a schedule can help you remember to take it. Try to time your medicine so that you can take it before starting an activity. This might be before you get dressed, go for a walk, or sit down for dinner.  · Constipation is a common side effect of pain medicines. Call your doctor before taking any medicines such as laxatives or stool softeners to help ease constipation. Also ask if you should skip any foods. Drinking lots of fluids and eating foods such as fruits and vegetables that are high in fiber can also help. Remember, do not take laxatives unless your surgeon has prescribed them.  · Drinking alcohol and taking pain medicine can cause dizziness and slow your breathing. It can even be deadly. Do not drink alcohol while taking pain medicine.  · Pain medicine can make you react more slowly to things. Do not drive or run machinery while taking pain medicine.  Your health care provider may tell you to take acetaminophen to help ease your pain. Ask him or her how much you are supposed to take each day. Acetaminophen or  other pain relievers may interact with your prescription medicines or other over-the-counter (OTC) drugs. Some prescription medicines have acetaminophen and other ingredients. Using both prescription and OTC acetaminophen for pain can cause you to overdose. Read the labels on your OTC medicines with care. This will help you to clearly know the list of ingredients, how much to take, and any warnings. It may also help you not take too much acetaminophen. If you have questions or do not understand the information, ask your pharmacist or health care provider to explain it to you before you take the OTC medicine.  Managing nausea  Some people have an upset stomach after surgery. This is often because of anesthesia, pain, or pain medicine, or the stress of surgery. These tips will help you handle nausea and eat healthy foods as you get better. If you were on a special food plan before surgery, ask your doctor if you should follow it while you get better. These tips may help:  · Do not push yourself to eat. Your body will tell you when to eat and how much.  · Start off with clear liquids and soup. They are easier to digest.  · Next try semi-solid foods, such as mashed potatoes, applesauce, and gelatin, as you feel ready.  · Slowly move to solid foods. Dont eat fatty, rich, or spicy foods at first.  · Do not force yourself to have 3 large meals a day. Instead eat smaller amounts more often.  · Take pain medicines with a small amount of solid food, such as crackers or toast, to avoid nausea.     Call your surgeon if  · You still have pain an hour after taking medicine. The medicine may not be strong enough.  · You feel too sleepy, dizzy, or groggy. The medicine may be too strong.  · You have side effects like nausea, vomiting, or skin changes, such as rash, itching, or hives.       If you have obstructive sleep apnea  You were given anesthesia medicine during surgery to keep you comfortable and free of pain. After surgery,  you may have more apnea spells because of this medicine and other medicines you were given. The spells may last longer than usual.   At home:  · Keep using the continuous positive airway pressure (CPAP) device when you sleep. Unless your health care provider tells you not to, use it when you sleep, day or night. CPAP is a common device used to treat obstructive sleep apnea.  · Talk with your provider before taking any pain medicine, muscle relaxants, or sedatives. Your provider will tell you about the possible dangers of taking these medicines.  Date Last Reviewed: 10/16/2014  © 8281-7580 [employer]. [address] [place] [address]. All rights reserved. This information is not intended as a substitute for professional medical care. Always follow your healthcare professional's instructions.            Primary Diagnosis     Your primary diagnosis was:  New Acl Tear      Admission Information     Date & Time Provider Department CSN    3/9/2017  5:39 AM Kalen Kaufman MD [facility] [id number]      Care Providers     Provider Role Specialty Primary office phone    Kalen Kaufman MD Attending Provider Sports Medicine [phone]    Kalen Kaufman MD Surgeon  Sports Medicine [phone]      Your Vitals Were     BP Pulse Temp Resp Height Weight    125/74 74 97.9 °F (36.6 °C) (Temporal) 18 5' (1.524 m) 70.3 kg (155 lb)    Last Period SpO2 BMI          02/07/2017 96% 30.27 kg/m2        Recent Lab Values     No lab values to display.      Allergies as of 3/9/2017        Reactions    Grapefruit Anaphylaxis    Pineapple Anaphylaxis    Augmentin [Amoxicillin-pot Clavulanate] Hives    Penicillins Hives    Tamiflu [Oseltamivir] Diarrhea, Nausea And Vomiting      Advance Directives     An advance directive is a document which, in the event you are no longer able to make decisions for yourself, tells your healthcare team what kind of treatment you do or do not want to receive, or  who you would like to make those decisions for you.  If you do not currently have an advance directive, [facility] encourages you to create one.  For more information call:  [phone]), [phone] ([phone]),  or log on to [email or web address].        Language Assistance Services     ATTENTION: Language assistance services are available, free of charge. Please call [phone].      ATENCIÓN: Si habla español, tiene a shea disposición servicios gratuitos de asistencia lingüística. Llame al [phone].     CHÚ Ý: N?u b?n nói Ti?ng Vi?t, có các d?ch v? h? tr? ngôn ng? mi?n phí dành cho b?n. G?i s? [phone].         [facility] complies with applicable Federal civil rights laws and does not discriminate on the basis of race, color, national origin, age, disability, or sex.

## 2017-03-09 NOTE — OP NOTE
PREOPERATIVE DIAGNOSES:   1. Right knee anterior cruciate ligament tear.   2. Right knee medial meniscus tear.   3. Right knee lateral meniscus tear     POSTOPERATIVE DIAGNOSES:   1. Right knee anterior cruciate ligament tear.   2. Right knee medial meniscus tear.   3. Right knee lateral meniscus tear.     PROCEDURES:   1. Right knee anterior cruciate ligament reconstruction with ipsilateral   hamstring autograft AND gracilis allograft.   2. Right knee arthroscopic medial and lateral meniscus repair with lateral meniscus root repair  3. Right knee arthroscopic chondroplasty     SURGEON: Kalen Kaufman MD      ASSISTANTS: HALEY Mohan     COMPLICATIONS: None.      POSITION: Supine.      ANESTHESIA: General endotracheal plus right lower extremity femoral canal block.      COMPLICATIONS: None.      EBL: 20cc    EXAMINATION UNDER ANESTHESIA: Right knee, full range of motion, grade II B   positive Lachman, 2+ pivot shift. No opening to varus and mild valgus stress.      ARTHROSCOPIC FINDINGS:   1. Complete tearing, anterior cruciate ligament.   2. Medial meniscus posterior horn and body tear  3. Lateral meniscus posterior horn and meniscus root tear  4. Patellar chondromalacia Grade II  5. Medial femoral condyle chondromalacia Grade II     IMPLANTS:  RIDGID LOOP ADJUSTABLE BUTTON (STANDARD)  6 OMNI-SPAN MENISCAL REPAIR SUTURES  BIO-INTRAFIX TIBIAL SHEATH (LARGE)  BIO-INTRAFIX TAPERED SCREW (7-9X30MM)  MSKTF: Gracillis Tendon  6.5x25 mm cancellous screw  17mm spiked washer  #2 Orthocord     INDICATIONS: The patient is a 26-year-old female with a history of right knee injury, pain and instability and complete tear of her anterior cruciate ligament with known medial meniscus tear noted on MRI. The patient sustained her injury jumping on a trampoline. She was seen and examined and after being counseled on MRI findings the decision was made to go forth with surgical intervention. The patient participated in pre-hab  prior to surgery. Consent was obtained with she and her mother and all of the risks and benefits were discussed. They agreed to proceed.         DESCRIPTION OF PROCEDURE: The patient was brought in the room after having all questions answered and ensuring she had not had any changes in her health. After undergoing general endotracheal anesthesia. The right lower extremity was examined and noted to be significant for 2B Lachman and + pivot shift. The right lower extremity femoral and adductor canal blocks were placed pre-operatively. Her right lower extremity was prepped and draped in usual sterile fashion after placed in the right lower extremity in an arthroscopic leg crow with a nonsterile tourniquet high up around the thigh. A contralateral leg was placed well padded on the OR table and secured. Her right lower extremity was prepped and draped in usual sterile fashion. A time-out was taken properly identifying the patient and the procedure to be performed and the receipt of IV antibiotics.       The right lower extremity was then exsanguinated and the ourniquet was elevated to 300 mmHg. Total tourniquet time for the case was approximately 26 minutes.    Attention was turned to the hamstring graft harvested first. A 3-cm oblique incision was made approximately 3 fingerbreadths below the medial joint line. After going through skin and subcutaneous tissues, thickness skin flaps were developed. Layer 1 was identified. The semitendinosus and gracilis were identified on the backside of layer 1. They were stripped of soft tissue attachments and prepared in the back table by the first assistant. The gracilis was noted to be insufficient in size and length. The decision was made to use the gracilis allograft that was previously made available. Once they were sized to be a sized 8.5, there were kept under tension until they were needed later in the case. The tourniquet was then deflated.    Standard inferolateral and  anteromedial portals were created after whichThe patellofemoral joint was entered. There was evidence of patellar chondromalacia but no trochlear chondromalacia. There was no evidence of loose bodies in the suprapatellar pouch. The medial and lateral gutters were clean.     Attention was turned to the intercondylar notch. The ACL had a complete tear of the femoral insertion with an empty lateral wall sign.      We then returned to the arthroscopic portion of the case.     Attention was turned to the medial compartment. There was a posterior horn and body meniscus tear in the red-red zone and unstable. There was some evidence of the subluxation of the tear into the joint. We were able to complete the meniscus repair using 4 Omni-span Meniscus sutures in a vertical mattress patter. The overlying and underlying cartilage was intact however the the anterolateral medial femoral condyle did have Grade II chondromalacia that was minimally debrided.      Attention was turned to the intercondylar notch. The ACL had a complete tear of the femoral insertion with an empty lateral wall sign. The remnant of the ACL was debrided down and the anatomic insertion point on the femur and the tibia was marked with a VAPR device. Minimal notchplasty was performed to prevent any graft impingement.      Attention was turned to the lateral compartment. The lateral meniscus had a posterior horn and meniscal root tear and lateral femoral condyle and tibial plateau were intact. The lateral meniscus was repaired with 2 vertical mattress Omnispan sutures and a transtibial meniscal root suture that was tied over a post at the end of the case.     We then returned to the notch and the accessory medial portal was used to drill our femoral tunnel. The anatomic insertion site was marked and drilled with the knee in 125 degrees or greater hyperflexion. A socket was drilled to approximately 30mm and removed for a 8.5-mm graft. The tibial tunnel was  established in the center aspect of the ACL footprint. A guidewire followed by a fluted reamer gives our appropriate tunnel.      The graft was shuttled through the tibial tunnel and into the femoral tunnel. The RigidLoop adjustable button was deployed and had excellent fixation. The knee was cycled 20 times under 20 pounds of pressure and the graft was again tensioned. There was no evidence of PCL or lateral wall impingement. There was no evidence of roof impingement on full extension.  A 7-9 mmBio-Intrafix screw plus sheath was inserted while performing a reverse lachman, no dilation was needed. The meniscus root sutures and the sutures from the ACL graft were then secured to the tibia using a 6.5mm cancellous screw with spiked washer.     All wounds were copiously irrigated, closed in layers with 0 Vicryl, 2-0 Vicryl and 4-0 Monocryl. The portal sites were closed with 3-0 Nylon. The hamstring harvest site was dressed with Mastisol, Steri-Strips, Xeroform, 4 x 4 fluffs, ABD pads and thigh high ROSA hose. The patient was extubated in the room, transferred to Recovery Room in stable condition accompanied by her physician. After having post-op films completed, the ice pack and brace were placed on the patient.    The brace will be locked in extension for ambulation and patient can flex to 90 deg only for the first several weeks.      There were no complications during the case. I prepped and scrubbed and did perform all critical portions of the case.      The case could not have been done without a first assistant.     If there are any questions regarding the procedure please feel free to contact me.     Kalen Kaufman MD

## 2017-03-09 NOTE — ANESTHESIA PROCEDURE NOTES
Peripheral right continuous femoral nerve block    Patient location during procedure: pre-op   Block not for primary anesthetic.  Reason for block: at surgeon's request and post-op pain management   Post-op Pain Location: right acl tear  Start time: 3/9/2017 6:50 AM  Timeout: 3/9/2017 6:45 AM   End time: 3/9/2017 6:57 AM  Surgery related to: right ACL tear  Staffing  Anesthesiologist: TRINITY PEDRO  Other anesthesia staff: JACE COLMENARES  Performed by: anesthesiologist   Preanesthetic Checklist  Completed: patient identified, site marked, surgical consent, pre-op evaluation, timeout performed, IV checked, risks and benefits discussed and monitors and equipment checked  Peripheral Block  Patient position: supine  Prep: ChloraPrep and site prepped and draped  Patient monitoring: heart rate, cardiac monitor, continuous pulse ox, continuous capnometry and frequent blood pressure checks  Block type: femoral  Laterality: right  Injection technique: continuous  Needle  Needle type: Stimuplex   Needle gauge: 18 G  Needle length: 4 in  Needle localization: ultrasound guidance and anatomical landmarks  Needle insertion depth: 6 cm  Catheter type: stimulating  Catheter size: 20 G  Catheter at skin depth: 6 cm  Test dose: lidocaine 1.5% with Epi 1-to-200,000 and negative   -ultrasound image captured on disc.  Assessment  Injection assessment: negative aspiration, negative parasthesia and local visualized surrounding nerve  Paresthesia pain: none  Heart rate change: no  Slow fractionated injection: yes  Medications:  Bolus administered: 30 mL of 0.5 bupivacaine  Epinephrine added: 5 mcg/mL (1/200,000)  Additional Notes  The nerve catheter procedure was delayed by the patient refusing sedation until she spoke with Dr. Kaufman this am.  Otherwise, the procedure was uneventful.

## 2017-03-09 NOTE — ANESTHESIA PREPROCEDURE EVALUATION
03/09/2017  Carlos Coulter is a 26 y.o., female.    [facility] Anesthesia Evaluation    I have reviewed the Patient Summary Reports.    I have reviewed the Nursing Notes.   I have reviewed the Medications.     Review of Systems  Anesthesia Hx:  Denies Hx of Anesthetic complications    Social:  Former Smoker Former smoker, quit 2014   EENT/Dental:   chronic allergic rhinitis   Cardiovascular:   Exercise tolerance: good    Pulmonary:   Asthma mild    Hepatic/GI:   GERD    Endocrine:   Hypothyroidism    Psych:   Psychiatric History anxiety depression Bipolar d/o, on abilify  ADHD, on vyvanse  Learning/speech disability         Physical Exam  General:  Well nourished    Airway/Jaw/Neck:  Airway Findings: Mouth Opening: Normal Tongue: Normal  General Airway Assessment: Adult, Average  Mallampati: II  Jaw/Neck Findings:  Neck ROM: Normal ROM       Chest/Lungs:  Chest/Lungs Findings: Clear to auscultation, Normal Respiratory Rate     Heart/Vascular:  Heart Findings: Rate: Normal  Rhythm: Regular Rhythm  Sounds: Normal  Heart murmur: negative       Mental Status:  Mental Status Findings:  Cooperative, Alert and Oriented         Anesthesia Plan  Type of Anesthesia, risks & benefits discussed:  Anesthesia Type:  general  Patient's Preference:   Intra-op Monitoring Plan:   Intra-op Monitoring Plan Comments:   Post Op Pain Control Plan:   Post Op Pain Control Plan Comments:   Induction:   IV  Beta Blocker:  Patient is not currently on a Beta-Blocker (No further documentation required).       Informed Consent: Patient understands risks and agrees with Anesthesia plan.  Questions answered. Anesthesia consent signed with patient.  ASA Score: 3     Day of Surgery Review of History & Physical: I have interviewed and examined the patient. I have reviewed the patient's H&P dated:  There are no significant changes.       Anesthesia Plan Notes: Propofol general.        Ready For Surgery From Anesthesia Perspective.

## 2017-03-09 NOTE — ANESTHESIA POSTPROCEDURE EVALUATION
Anesthesia Post Evaluation    Patient: Carlos Coulter    Procedure(s) Performed: Procedure(s) (LRB):  RECONSTRUCTION, ACL WITH ALLOGRAFT & AUTOGRAFT,HAMSTRING (Right)  REPAIR-MENISCUS MEDIAL & LATERAL (Right)    Final Anesthesia Type: general  Patient location during evaluation: PACU  Patient participation: Yes- Able to Participate  Level of consciousness: awake and alert and oriented  Post-procedure vital signs: reviewed and stable  Pain management: adequate  Airway patency: patent  PONV status at discharge: No PONV  Anesthetic complications: no      Cardiovascular status: hemodynamically stable  Respiratory status: unassisted, spontaneous ventilation and room air  Hydration status: euvolemic  Follow-up needed   Comments: Pain well controlled. Nerve infusion started at 10ml/hr.  APS to f/u c pt tomorrow.        Visit Vitals    /65 (BP Location: Left arm, Patient Position: Lying, BP Method: Automatic)    Pulse 72    Temp 36.6 °C (97.9 °F) (Temporal)    Resp 16    Ht 5' (1.524 m)    Wt 70.3 kg (155 lb)    LMP 02/07/2017    SpO2 97%    Breastfeeding No    BMI 30.27 kg/m2       Pain/Nae Score: Pain Assessment Performed: Yes (3/9/2017 11:20 AM)  Presence of Pain: complains of pain/discomfort (3/9/2017 11:20 AM)  Pain Rating Prior to Med Admin: 0 (3/9/2017 12:42 PM)  Nae Score: 8 (3/9/2017 11:20 AM)

## 2017-03-09 NOTE — DISCHARGE INSTRUCTIONS
[address], [place]                                                                                          [phone]                   Postoperative Instructions for Knee Surgery                 Your Surgery Included:   Open               Arthroscopic   [] Ligament Repair       [x] Diagnostic           [] ACL     [] PCL     [] MCL     [] PLLC      [x] Synovectomy / Plica Removal [] Meniscal Cartilage Repair / Transplantation      [] Lysis of Adhesions / Manipulation [] Articular Cartilage Repair      [] Interval Release           [] Microfracture       [] OATS   [] ACI      [] Meniscectomy           [] Osteochondral Allograft      [x] Meniscal Cartilage Repair  [] Patellar Realignment       [x] Debridement / Chondroplasty         [] Lateral Release   [] Ligament Repair      [] Articular Cartilage Repair          [] Extensor Mechanism             []   Microfracture  []  OATS         []  Cartilage Biopsy [] Tendon Repair          [x] Ligament Reconstruction          [] Patella                  [] Quadriceps             [x]   ACL    []   PCL  [] High Tibial Osteotomy       [] PRP Arthrocentesis  [] Joint Replacement                    [] Unicompartmental   [] Patellofemoral                    [] Total Knee                  Call our office ([phone]) immediately if you experience any of the following:       Excessive bleeding or pus like drainage at the incision site       Uncontrollable pain not relieved by pain medication       Excessive swelling or redness at the incision site       Fever above 101.5 degrees not controlled with Tylenol or Motrin       Shortness of Breath       Any foul odor or blistering from the surgery site    FOR EMERGENCIES: If any unusual problems or difficulties occur, call our office at [phone], or page the  at [phone] who will direct your call appropriately    1.   Pain Management: A cold therapy cuff, pain medications,  local injections, and in some cases, regional anesthesia injections are used to manage your post-operative pain. The decision to use each of these options is based on their risks and benefits.     Medications: You were given one or more of the following medication prescriptions before leaving the hospital. Have the prescriptions filled at a pharmacy on your way home and follow the instructions on the bottles. If you need a refill, please call your pharmacy.      Narcotic Medication (usually Vicodin ES, Lortab, Percocet or Nucynta): Begin taking the medication before your knee starts to hurt. Some patients do not like to take any medication, but if you wait until your pain is severe before taking, you will be very uncomfortable for several hours waiting for the narcotic to work. Always take with food.     Nausea / Vomiting: For this issue, we prescribe Phenergan, use this medication as directed.     Cold Therapy: You may have been sent home with a [employer]® cold therapy unit and wrap for your knee. Fill with ice and water to the indicated fill line and use throughout the day for the first two days and then as needed to help relieve pain and control swelling.      Regional Anesthesia Injections (Blocks): You may have been given a regional nerve block either before or after surgery. This may make your entire leg numb for 24-36 hours.                            * Proceed with caution when bearing weight on your leg.     2.   Diet: Eat a bland diet for the first day after surgery. Progress your diet as tolerated. Constipation may occur with Narcotic usage, contact our office if you are experiencing constipation.    3.   Activity: Limit your activity during the first 48 hours, keep your leg elevated with pillows under your heel. After the first 48 hours at home, increase your activity level based on your symptoms.    4.   Dressing Change: Remove the dressing on the 3rd day or your therapist may remove it at your first  appointment. It is normal for some blood to be seen on the dressings. It is also normal for you to see apparent bruising on the skin around your knee when you remove the dressing. If present, leave the steri-strip tape across the incisions. If you are concerned by the drainage or the appearance of your knee, please call one of the numbers listed below.    5.   Showering: You may shower on the 3rd day after surgery if the wound is dry and clean, but do not let the wound soak in water until sutures are removed. Do not submerge in any water until after your postoperative appointment in clinic.    6.   Knee Brace: You may have been sent home in a hinged knee brace. Your brace is set at 0 to 90 degrees of motion. Wear the brace for 6 weeks, LOCKED in full extension when walking, you will need to wear this brace at all time unless instructed otherwise. You may unlock at rest or for exercises.    7.   Your procedure did not require a Continuous Passive Motion (CPM) device.    8.   Weight Bearing: You may have been sent home with crutches. If instructed (see below), use these crutches at all times unless at complete rest.      [] Non-weight bearing for     0 weeks (you may touch your toes to the floor)      [x] Partial TOE TOUCH weight bearing for  6 weeks    [] 25% Body Weight   [] 50% Body Weight      [x] Full weight bearing            []  NOW    [x]  after 6 weeks     9.  Knee Exercises: Begin these exercises the first day after surgery in order to help you regain your motion and strength. You may do the following marked exercises:     [x] Quad Sets - Begin activating your quadriceps muscle by driving your          knee downward into full knee extension while seated on a table or bed   with a towel rolled and propped under your heel     [x] Straight Leg Raise (SLR) - While flavia your quadriceps muscle, lift     your fully extended leg to the level of your non-operative knee (as shown)          [x] Ankle pumps -  "With your knee straight, move your ankle in a "pumping"    fashion to activate your calf and leg muscles      10.  Physical Therapy: Physical therapy is an essential component to your recovery from surgery. Your physical therapy will start in 1 days.  **PLEASE FOLLOW ACL HAMSTRING RECONSTRUCTION WITH MENISCUS REPAIR PROTOCOL**    FIRST POSTOPERATIVE VISIT: As scheduled.          FOLLOW PREPRINTED INSTRUCTIONS REGARDING CARE/REMOVAL OF PAIN BALL    PAIN BALL TO BE REMOVED Saturday MARCH 11 ANYTIME AFTER 2P       Discharge Instructions: After Your Surgery  Youve just had surgery. During surgery you were given medicine called anesthesia to keep you relaxed and free of pain. After surgery you may have some pain or nausea. This is common. Here are some tips for feeling better and getting well after surgery.     Stay on schedule with your medication.   Going home  Your doctor or nurse will show you how to take care of yourself when you go home. He or she will also answer your questions. Have an adult family member or friend drive you home. For the first 24 hours after your surgery:  · Do not drive or use heavy equipment.  · Do not make important decisions or sign legal papers.  · Do not drink alcohol.  · Have someone stay with you, if needed. He or she can watch for problems and help keep you safe.  Be sure to go to all follow-up visits with your doctor. And rest after your surgery for as long as your doctor tells you to.  Coping with pain  If you have pain after surgery, pain medicine will help you feel better. Take it as told, before pain becomes severe. Also, ask your doctor or pharmacist about other ways to control pain. This might be with heat, ice, or relaxation. And follow any other instructions your surgeon or nurse gives you.  Tips for taking pain medicine  To get the best relief possible, remember these points:  · Pain medicines can upset your stomach. Taking them with a little food may help.  · Most pain " relievers taken by mouth need at least 20 to 30 minutes to start to work.  · Taking medicine on a schedule can help you remember to take it. Try to time your medicine so that you can take it before starting an activity. This might be before you get dressed, go for a walk, or sit down for dinner.  · Constipation is a common side effect of pain medicines. Call your doctor before taking any medicines such as laxatives or stool softeners to help ease constipation. Also ask if you should skip any foods. Drinking lots of fluids and eating foods such as fruits and vegetables that are high in fiber can also help. Remember, do not take laxatives unless your surgeon has prescribed them.  · Drinking alcohol and taking pain medicine can cause dizziness and slow your breathing. It can even be deadly. Do not drink alcohol while taking pain medicine.  · Pain medicine can make you react more slowly to things. Do not drive or run machinery while taking pain medicine.  Your health care provider may tell you to take acetaminophen to help ease your pain. Ask him or her how much you are supposed to take each day. Acetaminophen or other pain relievers may interact with your prescription medicines or other over-the-counter (OTC) drugs. Some prescription medicines have acetaminophen and other ingredients. Using both prescription and OTC acetaminophen for pain can cause you to overdose. Read the labels on your OTC medicines with care. This will help you to clearly know the list of ingredients, how much to take, and any warnings. It may also help you not take too much acetaminophen. If you have questions or do not understand the information, ask your pharmacist or health care provider to explain it to you before you take the OTC medicine.  Managing nausea  Some people have an upset stomach after surgery. This is often because of anesthesia, pain, or pain medicine, or the stress of surgery. These tips will help you handle nausea and eat  healthy foods as you get better. If you were on a special food plan before surgery, ask your doctor if you should follow it while you get better. These tips may help:  · Do not push yourself to eat. Your body will tell you when to eat and how much.  · Start off with clear liquids and soup. They are easier to digest.  · Next try semi-solid foods, such as mashed potatoes, applesauce, and gelatin, as you feel ready.  · Slowly move to solid foods. Dont eat fatty, rich, or spicy foods at first.  · Do not force yourself to have 3 large meals a day. Instead eat smaller amounts more often.  · Take pain medicines with a small amount of solid food, such as crackers or toast, to avoid nausea.     Call your surgeon if  · You still have pain an hour after taking medicine. The medicine may not be strong enough.  · You feel too sleepy, dizzy, or groggy. The medicine may be too strong.  · You have side effects like nausea, vomiting, or skin changes, such as rash, itching, or hives.       If you have obstructive sleep apnea  You were given anesthesia medicine during surgery to keep you comfortable and free of pain. After surgery, you may have more apnea spells because of this medicine and other medicines you were given. The spells may last longer than usual.   At home:  · Keep using the continuous positive airway pressure (CPAP) device when you sleep. Unless your health care provider tells you not to, use it when you sleep, day or night. CPAP is a common device used to treat obstructive sleep apnea.  · Talk with your provider before taking any pain medicine, muscle relaxants, or sedatives. Your provider will tell you about the possible dangers of taking these medicines.  Date Last Reviewed: 10/16/2014  © 7702-1999 [employer]. [address] [place] [address], [place] [address]. All rights reserved. This information is not intended as a substitute for professional medical care. Always follow your healthcare professional's  instructions.

## 2017-03-09 NOTE — TRANSFER OF CARE
Anesthesia Transfer of Care Note    Patient: Carlos Coulter    Procedure(s) Performed: Procedure(s) (LRB):  RECONSTRUCTION, ACL WITH ALLOGRAFT & AUTOGRAFT,HAMSTRING (Right)  REPAIR-MENISCUS MEDIAL & LATERAL (Right)    Patient location: PACU    Anesthesia Type: general    Transport from OR: Transported from OR on room air with adequate spontaneous ventilation    Post pain: adequate analgesia    Post assessment: no apparent anesthetic complications and tolerated procedure well    Post vital signs: stable    Level of consciousness: sedated    Nausea/Vomiting: no nausea/vomiting    Complications: none          Last vitals:   Visit Vitals    BP (!) 104/55    Pulse 68    Temp 36.2 °C (97.2 °F) (Skin)    Resp 18    Ht 5' (1.524 m)    Wt 70.3 kg (155 lb)    LMP 02/07/2017    SpO2 99%    Breastfeeding No    BMI 30.27 kg/m2

## 2017-03-10 ENCOUNTER — CLINICAL SUPPORT (OUTPATIENT)
Dept: REHABILITATION | Facility: HOSPITAL | Age: 27
End: 2017-03-10
Attending: ORTHOPAEDIC SURGERY
Payer: MEDICAID

## 2017-03-10 DIAGNOSIS — S83.511A NEW ACL TEAR, RIGHT, INITIAL ENCOUNTER: ICD-10-CM

## 2017-03-10 PROCEDURE — G8979 MOBILITY GOAL STATUS: HCPCS | Mod: CK,PN | Performed by: PHYSICAL THERAPIST

## 2017-03-10 PROCEDURE — G8978 MOBILITY CURRENT STATUS: HCPCS | Mod: CM,PN | Performed by: PHYSICAL THERAPIST

## 2017-03-10 PROCEDURE — 97163 PT EVAL HIGH COMPLEX 45 MIN: CPT | Mod: PN | Performed by: PHYSICAL THERAPIST

## 2017-03-10 NOTE — ANESTHESIA POST-OP PAIN MANAGEMENT
Acute Pain Service Progress Note    Carlos Coulter is a 26 y.o., female, [id number].    Surgery:  RECONSTRUCTION, ACL WITH ALLOGRAFT & AUTOGRAFT,HAMSTRING (Right )    Post Op Day #: 1    Catheter type: perineural  femoral    Infusion type: Ropivacaine 0.2%  10 basal    Problem List:    Active Hospital Problems    Diagnosis  POA    *New ACL tear [S83.519A]  Yes    Acute medial meniscus tear [S83.249A]  Yes    Complex tear of lateral meniscus of right knee as current injury [S83.271A]  Yes    Chondromalacia of right knee [M94.261]  Yes    Acute pain of right knee [M25.561]  Yes      Resolved Hospital Problems    Diagnosis Date Resolved POA   No resolved problems to display.       Subjective:     General appearance of alert, oriented, no complaints   Pain with rest: 1    Numbers   Pain with movement: 1    Numbers   Side Effects   None  Objective:     Catheter level unchanged   Catheter site clean, dry, intact         Vitals   Vitals:    03/09/17 1303   BP: 111/65   Pulse: 72   Resp: 16   Temp:         Labs    Admission on 03/09/2017, Discharged on 03/09/2017   Component Date Value Ref Range Status    POC Preg Test, Ur 03/09/2017 Negative  Negative Final     Acceptable 03/09/2017 Yes   Final        Meds   No current facility-administered medications for this encounter.      Current Outpatient Prescriptions   Medication Sig Dispense Refill    albuterol (PROAIR HFA) 90 mcg/actuation inhaler INHALE 1 PUFF EVERY 4 HOURS PRN for Wheezing 8.5 g 2    albuterol (PROVENTIL) 2.5 mg /3 mL (0.083 %) nebulizer solution Take 3 mLs (2.5 mg total) by nebulization every 6 (six) hours as needed for Wheezing. 1 Box 3    aripiprazole (ABILIFY) 5 MG Tab Take 5 mg by mouth once daily.      clobetasol (TEMOVATE) 0.05 % cream Apply 1 application topically daily as needed.       clobetasol (TEMOVATE) 0.05 % cream APPLY EXTERNALLY TO THE AFFECTED AREA TWICE DAILY 60 g 0    docusate sodium (COLACE) 100 MG  capsule Take 1 capsule (100 mg total) by mouth 2 (two) times daily. 60 capsule 0    FLONASE ALLERGY RELIEF 50 mcg/actuation nasal spray SHAKE WELL AND USE 2 SPRAYS IN EACH NOSTRIL EVERY DAY 1 Bottle 11    levothyroxine (SYNTHROID) 88 MCG tablet TAKE 1 TABLET BY MOUTH EVERY MORNING BEFORE BREAKFAST 90 tablet 1    lisdexamfetamine (VYVANSE) 40 MG Cap Take 40 mg by mouth once daily.      medroxyPROGESTERone (PROVERA) 10 MG tablet TAKE 1 TABLET(10 MG) BY MOUTH EVERY DAY 10 tablet 0    montelukast (SINGULAIR) 10 mg tablet Take 1 tablet (10 mg total) by mouth once daily. 90 tablet 3    oxycodone-acetaminophen (PERCOCET) 5-325 mg per tablet Take 1 tablet by mouth every 8 (eight) hours as needed for Pain. 90 tablet 0    sucralfate (CARAFATE) 1 gram tablet Take 1 tablet (1 g total) by mouth 4 (four) times daily. 40 tablet 0    trazodone (DESYREL) 100 MG tablet Take 1 tablet by mouth every evening.  0    triamcinolone acetonide 0.1% (KENALOG) 0.1 % ointment AAA bid x 2 weeks then prn, avoid chronic use 454 g 1    inhalation device (BREATHERITE VALVED MDI SPACER) Use as directed for inhalation. 1 Device 0    pantoprazole (PROTONIX) 40 MG tablet Take 1 tablet (40 mg total) by mouth once daily. Before breakfast 30 tablet 6    promethazine (PHENERGAN) 25 MG tablet Take 1 tablet (25 mg total) by mouth every 6 (six) hours as needed for Nausea. 20 tablet 0          Assessment:     Pain control adequate    Plan:     Patient doing well, continue present treatment.  Nerve cath to be d/c'd on 3/11/17.  Instructions given.      Evaluator Poli Pereyra

## 2017-03-10 NOTE — PLAN OF CARE
OUTPATIENT PHYSICAL THERAPY  PHYSICAL THERAPY EVALUATION    Name: Carlos Coulter  Clinic Number: [id number]    Diagnosis: New ACL tear, right, subsequent encounter     1. Right knee anterior cruciate ligament reconstruction with ipsilateral   hamstring autograft AND gracilis allograft.   2. Right knee arthroscopic medial and lateral meniscus repair with lateral meniscus root repair  3. 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; casted B    Bipolar disorder     sees psych    Chronic constipation     Constipation     Depression     sees psych    Eczema     GERD (gastroesophageal reflux disease)     Growth hormone deficiency     tx with growth hormone ages 12 - 15 by Dr Brewer    Heart murmur     saw cardiology 9/2014, intermittent, asymptomatic    Hypothyroidism     sees Dr Brewer (endo)    Insomnia     Learning disability     NOLAN (obstructive sleep apnea)     does not use CPap    Speech abnormality      Current Outpatient Prescriptions   Medication Sig    albuterol (PROAIR HFA) 90 mcg/actuation inhaler INHALE 1 PUFF EVERY 4 HOURS PRN for Wheezing    albuterol (PROVENTIL) 2.5 mg /3 mL (0.083 %) nebulizer solution Take 3 mLs (2.5 mg total) by nebulization every 6 (six) hours as needed for Wheezing.    aripiprazole (ABILIFY) 5 MG Tab Take 5 mg by mouth once daily.    clobetasol (TEMOVATE) 0.05 % cream Apply 1 application topically daily as needed.     clobetasol (TEMOVATE) 0.05 % cream APPLY EXTERNALLY TO THE AFFECTED AREA TWICE DAILY    docusate sodium (COLACE) 100 MG capsule  Take 1 capsule (100 mg total) by mouth 2 (two) times daily.    FLONASE ALLERGY RELIEF 50 mcg/actuation nasal spray SHAKE WELL AND USE 2 SPRAYS IN EACH NOSTRIL EVERY DAY    inhalation device (BREATHERITE VALVED MDI SPACER) Use as directed for inhalation.    levothyroxine (SYNTHROID) 88 MCG tablet TAKE 1 TABLET BY MOUTH EVERY MORNING BEFORE BREAKFAST    lisdexamfetamine (VYVANSE) 40 MG Cap Take 40 mg by mouth once daily.    medroxyPROGESTERone (PROVERA) 10 MG tablet TAKE 1 TABLET(10 MG) BY MOUTH EVERY DAY    montelukast (SINGULAIR) 10 mg tablet Take 1 tablet (10 mg total) by mouth once daily.    oxycodone-acetaminophen (PERCOCET) 5-325 mg per tablet Take 1 tablet by mouth every 8 (eight) hours as needed for Pain.    pantoprazole (PROTONIX) 40 MG tablet Take 1 tablet (40 mg total) by mouth once daily. Before breakfast    promethazine (PHENERGAN) 25 MG tablet Take 1 tablet (25 mg total) by mouth every 6 (six) hours as needed for Nausea.    sucralfate (CARAFATE) 1 gram tablet Take 1 tablet (1 g total) by mouth 4 (four) times daily.    trazodone (DESYREL) 100 MG tablet Take 1 tablet by mouth every evening.    triamcinolone acetonide 0.1% (KENALOG) 0.1 % ointment AAA bid x 2 weeks then prn, avoid chronic use     No current facility-administered medications for this visit.      Review of patient's allergies indicates:   Allergen Reactions    Grapefruit Anaphylaxis    Pineapple Anaphylaxis    Augmentin [amoxicillin-pot clavulanate] Hives    Penicillins Hives    Tamiflu [oseltamivir] Diarrhea and Nausea And Vomiting       History   Precautions: Follow protocol: TTWB per MD orders  The brace will be locked in extension for ambulation and patient can flex to 90 deg only for the first several weeks.   Prior Therapy: none noted  Nutrition:  Normal  History of Present Illness:   Patient reported jumping down from trampoline involving right knee and the ligaments prior to S/P right knee surgery 3/9/2017:  1. Right  knee anterior cruciate ligament reconstruction with ipsilateral   hamstring autograft AND gracilis allograft.   2. Right knee arthroscopic medial and lateral meniscus repair with lateral meniscus root repair  3. Right knee arthroscopic chondroplasty  Pain, numbness of the knee.  Chief complaint: right knee pain  Social History: live with family  Place of Residence (Steps/Adaptations): 4 steps entering house with one rail. Awaiting ramp to be build.  Functional Deficits Leading to Referral/Nature of Injury: S/P right knee scope involving right ACL, meniscal repair 3/9/2017, gait abnormality, impaired transfer skills, right knee motion deficits, RLE strength deficits  Previous functional status includes: independent  Exercise routine prior to onset : none noted  DME owned: bilateral crutches, right knee brace locked in extension     Subjective     Pts goals: To decrease pain. Walk independently. Increase strength to legs  Pain:  8/10  Onset of pain /Mechanism of Onset:  03/09/2017  Chief complaint:  Right knee pain  Radicular symptoms:  none  Aggravating factors:   Any movement  Easing factors:  Cold pack, medication      Objective     Mental status :alert  Posture Alignment :slouched posture  Sensation: Light Touch: Intact          ROM:  AROM: left knee: 0-120+ degrees    PROM:  Right knee:  Extension -8 degrees  Flexion: unable to assess due to muscle guarding at this time, S/P right knee surgery 3/9/2017.    Right quad atrophy noted with swelling as well.    MMT: right lower extremity  Right ankle DF/PF 4/5  Patient demonstrated muscle guarding through out assessment.    Special test:  Homans-    GAIT: Carlos ambulates 100 feet with bilateral crutches, right knee brace locked in extension TTWB per MD orders with supervision.    GAIT DEVIATIONS: decreased abdi, TTWB RLE with brace locked in extension at this time.    Transfers:  SBA with sit-to-stand transfers with education as well on body mechanics using  crutches. Patient verbalized understanding with demonstration.    Pt/family was provided educational information, including: role of PT, goals for PT, scheduling - pt verbalized understanding. Discussed insurance limitations with pt.     Exercises were reviewed and pt was able to demonstrate them prior to the end of the session. Pt received a written copy of exercises to perform at home.  Pt has no cultural, educational or language barriers to learning provided.    Treatment today also included:   Proper instruction for right knee brace locked in extension for bed, and walking purposes.  Patient needed assistance with donning/doffing RLE brace.  Seated: towel roll under ankle with strap for right knee extension purposes. HEP addressing seated: right gastroc stretch, glut sets as well. Patient tolerated therapy without increase in s/s.  Parent at side with instruction on HEP as well. Patient and parent verbalized understanding.    CMS Impairment/Limitation/Restriction for FOTO Knee Survey  Status Limitation G-Code CMS Severity Modifier  Intake 18% 82% Current Status CM - At least 80 percent but less than 100 percent  Predicted 48% 52% Goal Status+ CK - At least 40 percent but less than 60 percent    Assessment   This is a 26 y.o. female referred to outpatient physical therapy and presents with a medical diagnosis of S/P right knee arthroscopy, acute ACL tear: procedures  S/P: right knee scope: 03/09/2017  1. Right knee anterior cruciate ligament reconstruction with ipsilateral   hamstring autograft AND gracilis allograft.   2. Right knee arthroscopic medial and lateral meniscus repair with lateral meniscus root repair  3. Right knee arthroscopic chondroplasty     and demonstrates limitations as described in the problem list. Pt will benefit from physical therapy services in order to maximize pain free and/or functional use of right knee. The following goals were discussed with the patient and patient is in agreement  with them as to be addressed in the treatment plan.     Problem List: pain, decreased ROM, decreased flexibility, decreased strength, decreased balance and stability, decreased motor control, antalgic gait, inability to participate fully in vocation pursuits, decreased ability to fully participate in recreational/sports related activities and impaired transfer skills, impaired balance, gait abnormality. Impaired ADL. Unable to negotiate stairs as well.    History  Co-morbidities and personal factors that may impact the plan of care Examination  Body Structures and Functions, activity limitations and participation restrictions that may impact the plan of care Clinical Presentation   Decision Making/ Complexity Score   Co-morbidities:   -BMI              Personal Factors:   -anxiety, panic disorder Body Regions:  -right knee    Body Systems:   -postural imbalance  -right knee ROM deficits  -RLE: strength deficits  -gait abnormality  -swelling  -right knee pain  -right quad atrophy  -decreased  RLE motor control        Activity limitations:   -standing  -gait  -transfer skills    Participation Restrictions:   -community ambulation  -walking two blocks  -home management  -walking between rooms  -french/doffing LE clothing  -ascending/descending 4 steps       Stable/evolving FOTO:  CMS Impairment/Limitation/Restriction for FOTO Knee Survey  Status Limitation G-Code CMS Severity Modifier  Intake 18% 82% Current Status CM - At least 80 percent but less than 100 percent      -High Complexity       Short Term Goals:  6 weeks  1. Pt will initiate HEP addressing right knee extension to 0 degrees for ambulatory purposes..   2. Pt will present with increased right quad MMT by one half grade for increased ease with functional ADLs.  3. Pt will report decreased pain to </= 5/10  4. Pt will improve right knee passive flexion to 90 degrees in pain-free range for mobility purposes.  5. Patient able to negotiate 4 steps modified  independently without painful limitations.    Long Term Goals: 24 weeks  1. Patient will return to community ambulation independently with right knee active motion: 0-120+ degrees with < 3/10 pain.  2. Patient will demonstrate right lower extremity strength 4+ to 5/5 for standing/mobility purposes.  3. Patient will demonstrate right single leg squat from 0-60 degrees for stability/dynamis purposes.  4. Pt will be independent with HEP and self management of symptoms.       Plan     Certification Period:  03/10/2017 to 08/25/2017    Outpatient physical therapy 2-3 times week for 24 weeks to include: Electrical Stimulation NMES, IFS/TENS, Gait Training, Group Therapy, Manual Therapy, Moist Heat/ Ice, Neuromuscular Re-ed, Patient Education, Therapeutic Activites and Therapeutic Exercise Cont PT for  24 weeks. Pt may be seen by PTA as part of the rehabilitation team.     Michele Trujillo, PT, DPT      I certify the need for these services furnished under this plan of treatment and while under my care.  ____________________________________ Physician/Referring Practitioner   Date of Signature

## 2017-03-11 NOTE — ANESTHESIA POST-OP PAIN MANAGEMENT
Acute Pain Service Progress Note    Carlos Coulter is a 26 y.o., female, [id number].    Surgery:  ACL repair    Post Op Day #: 2    Catheter type: perineural  femoral    Infusion type: Ropivacaine 0.2%  8 basal    Sleepy with pain medication, but pain well controlled. To remove catheter today at 2 pm.    Problem List:    Active Hospital Problems    Diagnosis  POA    *New ACL tear [S83.519A]  Yes    Acute medial meniscus tear [S83.249A]  Yes    Complex tear of lateral meniscus of right knee as current injury [S83.271A]  Yes    Chondromalacia of right knee [M94.261]  Yes    Acute pain of right knee [M25.561]  Yes      Resolved Hospital Problems    Diagnosis Date Resolved POA   No resolved problems to display.          Plan:     Discontinue present therapy. Analgesia to be provided by the primary team, Orthopedics.    Evaluator Court Warren

## 2017-03-11 NOTE — ADDENDUM NOTE
Addendum  created 03/11/17 1113 by Court Warren MD    Anesthesia Event edited, Procedure Event Log accessed, Sign clinical note

## 2017-03-12 ENCOUNTER — NURSE TRIAGE (OUTPATIENT)
Dept: ADMINISTRATIVE | Facility: CLINIC | Age: 27
End: 2017-03-12

## 2017-03-13 ENCOUNTER — CLINICAL SUPPORT (OUTPATIENT)
Dept: REHABILITATION | Facility: HOSPITAL | Age: 27
End: 2017-03-13
Attending: ORTHOPAEDIC SURGERY
Payer: MEDICAID

## 2017-03-13 DIAGNOSIS — S83.511D NEW ACL TEAR, RIGHT, SUBSEQUENT ENCOUNTER: ICD-10-CM

## 2017-03-13 PROCEDURE — 97110 THERAPEUTIC EXERCISES: CPT | Mod: PN | Performed by: PHYSICAL THERAPIST

## 2017-03-13 NOTE — PROGRESS NOTES
[id number]  Date of Treatment: 03/13/2017   Diagnosis:   Encounter Diagnosis   Name Primary?    New ACL tear, right, subsequent encounter        Time in: 0900  Time Out: 1000  Total Treatment Time: 55  Group Time: 0      Subjective:    Carlos reports improvement of symptoms, muscle soreness to right knee. No falls. Getting assistance from family members. Patient reports their pain to be 6/10 on a 0-10 scale with 0 being no pain and 10 being the worst pain imaginable.    Objective    Patient received individual therapy to increase strength, endurance, ROM, flexibility, posture and core stabilization with 0 patients with activities as follows:     Carlos received therapeutic exercises to develop strength, endurance, ROM, flexibility, posture and core stabilization for 55 minutes including:     -NMES: right quad( VMO, RF) x 20 minutes, 10/30 cycle for quad strengthening purposes/ neuromuscular education.  -quad sets 2/10  -glut sets 2/10  -patella mobility  -supine: hip adduction/abduction 2/10 on sliding board    -PROM: right knee extension, flexion    -cold pack x 10 minutes to right knee for swelling,muscle soreness purposes.    Gait: with bilateral crutches, TTWB/NWB to RLE, brace locked in extension. SBA  Transfers: SBA/CGA with sit-to-stand with bilateral crutches, knee locked in extension.    -Negotiating stairs:  Patient declined due to muscle soreness and would like to attempt at a later time.    Written Home Exercises Provided: Continuation  Pt demo good understanding of the education provided. Carlos demonstrated good return demonstration of activities.     Assessment:   -right knee extension improvement to 0 degrees passively  -right quad atrophy  -decreased motor control  -swelling of right knee  -gait abnormality  -muscle guarding noted with PROM    Pt will continue to benefit from skilled PT intervention. Medical Necessity is demonstrated by:  Unable to  participate in daily activities, Continued inability to participate in vocational pursuits, Pain limits function of effected part for some activities, Unable to participate fully in daily activities, Requires skilled supervision to complete and progress HEP, Weakness and Edema.    Patient is making good progress towards established goals.    New/Revised goals:   Short Term Goals: 6 weeks  1. Pt will initiate HEP addressing right knee extension to 0 degrees for ambulatory purposes..   2. Pt will present with increased right quad MMT by one half grade for increased ease with functional ADLs.  3. Pt will report decreased pain to </= 5/10  4. Pt will improve right knee passive flexion to 90 degrees in pain-free range for mobility purposes.  5. Patient able to negotiate 4 steps modified independently without painful limitations.       Plan:  -Will start seated PROM: knee flexion next couple sessions.    Continue with established Plan of Care towards PT goals.

## 2017-03-15 ENCOUNTER — CLINICAL SUPPORT (OUTPATIENT)
Dept: REHABILITATION | Facility: HOSPITAL | Age: 27
End: 2017-03-15
Attending: ORTHOPAEDIC SURGERY
Payer: MEDICAID

## 2017-03-15 DIAGNOSIS — S83.511D NEW ACL TEAR, RIGHT, SUBSEQUENT ENCOUNTER: ICD-10-CM

## 2017-03-15 PROCEDURE — 97110 THERAPEUTIC EXERCISES: CPT | Mod: PN | Performed by: PHYSICAL THERAPIST

## 2017-03-15 NOTE — PLAN OF CARE
CMS Impairment/Limitation/Restriction for FOTO Knee Survey  Status Limitation G-Code CMS Severity Modifier  Intake 18% 82% Current Status CM - At least 80 percent but less than 100 percent  Predicted 48% 52% Goal Status+ CK - At least 40 percent but less than 60 percent

## 2017-03-15 NOTE — PROGRESS NOTES
"[id number]  Date of Treatment: 03/15/2017   Diagnosis:   Encounter Diagnosis   Name Primary?    New ACL tear, right, subsequent encounter    Precautions: Follow protocol: TTWB per MD orders  The brace will be locked in extension for ambulation and patient can flex to 90 deg only for the first several weeks.     Time in: 0900  Time Out: 1000  Total Treatment Time: 55  Group Time: 0      Subjective:    Carlos reports improvement of symptoms. Getting assistance from family members and spouse. No falls. Patient reports their pain to be 5/10 on a 0-10 scale with 0 being no pain and 10 being the worst pain imaginable.    Objective    Patient received individual therapy to increase strength, endurance, ROM, flexibility, posture and core stabilization with 0 patients with activities as follows:     Carlos received therapeutic exercises to develop strength, endurance, ROM, flexibility, posture and core stabilization for 55 minutes including:   -Stairs: unable to demonstrate due to weight-bearing status, unsafe at this time.  -seated: right knee flexion, gravity assisted x 5 minutes    -NMES: right quad( VMO, RF) x 20 minutes, 10/30 cycle for quad strengthening purposes/ neuromuscular education.  -quad sets 5 x 30" with strap, towel under ankle    -glut sets 2/10  -patella mobility(4-way)  -heel slides AAROM: with strap with therapist assistance in pain-free range    -supine: hip adduction/abduction 2/10 on sliding board     -PROM: right knee extension, flexion     -cold pack x 10 minutes to right knee for swelling,muscle soreness purposes.     Gait: with bilateral crutches, TTWB/NWB to RLE, brace locked in extension. SBA    Transfers: SBA/CGA with sit-to-stand with bilateral crutches, knee locked in extension.     -Negotiating stairs:  Patient declined due to muscle soreness and would like to attempt at a later time.    Written Home Exercises Provided: Continuation  Pt demo " good understanding of the education provided. Carlos demonstrated good return demonstration of activities.     Assessment:   -right knee extension improvement to 0 degrees passively  -right quad atrophy  -gait abnormality with bilateral crutches  -muscle guarding noted with PROM  -PROM: flexion 55 degrees noted with muscle guarding    Pt will continue to benefit from skilled PT intervention. Medical Necessity is demonstrated by:  Weakness.    Patient is making good progress towards established goals.    Plan:  Continue with established Plan of Care towards PT goals.

## 2017-03-21 ENCOUNTER — CLINICAL SUPPORT (OUTPATIENT)
Dept: REHABILITATION | Facility: HOSPITAL | Age: 27
End: 2017-03-21
Attending: ORTHOPAEDIC SURGERY
Payer: MEDICAID

## 2017-03-21 DIAGNOSIS — S83.511D NEW ACL TEAR, RIGHT, SUBSEQUENT ENCOUNTER: ICD-10-CM

## 2017-03-21 PROCEDURE — 97110 THERAPEUTIC EXERCISES: CPT | Mod: PN

## 2017-03-21 NOTE — PROGRESS NOTES
"[id number]  Date of Treatment: 03/21/2017   Diagnosis:   Encounter Diagnosis   Name Primary?    New ACL tear, right, subsequent encounter    Precautions: Follow protocol: TTWB per MD orders  The brace will be locked in extension for ambulation and patient can flex to 90 deg only for the first several weeks.     Time in: 8:07  Time Out: 9:13  Total Treatment Time: 55  Group Time: 30      Subjective:    Carlos reports improvement of symptoms, and getting out of the house more. Easier getting in and out of the house as well. Continued assistance from family members and spouse. No falls. Patient reports their pain to be 5/10 on a 0-10 scale with 0 being no pain and 10 being the worst pain imaginable.    Objective    Patient received individual therapy to increase strength, endurance, ROM, flexibility, posture and core stabilization with 0 patients with activities as follows:     Carlos received therapeutic exercises to develop strength, endurance, ROM, flexibility, posture and core stabilization for 50 minutes (30 min 1:1) including:   -Stairs: unable to demonstrate due to weight-bearing status, unsafe at this time.  -seated: right knee flexion, gravity assisted x 5 minutes    -NMES: right quad( VMO, RF) x 15 minutes, 10/30 cycle for quad strengthening purposes/ neuromuscular education.  -quad sets 5 x 30" with strap, towel under ankle    -glut sets 2/10  -patella mobility(4-way)  -heel slides AAROM: with strap with therapist assistance in pain-free range    -supine: hip adduction/abduction 2/10 on sliding board     -PROM: right knee extension, flexion     -cold pack x 10 minutes to right knee for swelling,muscle soreness purposes.     Gait: with bilateral crutches, TTWB/NWB to RLE, brace locked in extension. SBA    Transfers: SBA/CGA with sit-to-stand with bilateral crutches, knee locked in extension.     -Negotiating stairs:  Patient declined due to muscle soreness and " would like to attempt at a later time.    Written Home Exercises Provided: Continuation of current written HEP. Educated pt on increased compliance to quad sets and passive knee flexion with use of belt or dog leash for assistance.  Pt demo good understanding of the education provided. Lavidia demonstrated good return demonstration of activities.     Assessment:   Pt dem overall good tolerance to treatment. Right knee extension improvement to 0 degrees active following NMES yet continues with R quad weakness and atrophy. Guarding of R knee motion and pt hesitant for knee mobility. Demonstrates gait abnormality with bilateral crutches with avoidance of TTWB on R LE. Passive mobility is improving with verbal and tactile cues to relax.     Pt will continue to benefit from skilled PT intervention. Medical Necessity is demonstrated by:  Weakness.    Patient is making good progress towards established goals.    Plan:  Continue with established Plan of Care towards PT goals.

## 2017-03-23 ENCOUNTER — CLINICAL SUPPORT (OUTPATIENT)
Dept: REHABILITATION | Facility: HOSPITAL | Age: 27
End: 2017-03-23
Attending: ORTHOPAEDIC SURGERY
Payer: MEDICAID

## 2017-03-23 DIAGNOSIS — S83.511D NEW ACL TEAR, RIGHT, SUBSEQUENT ENCOUNTER: ICD-10-CM

## 2017-03-23 PROCEDURE — 97110 THERAPEUTIC EXERCISES: CPT | Mod: PN

## 2017-03-23 NOTE — PROGRESS NOTES
"[facility] [id number]  Date of Treatment: 03/23/2017   Diagnosis:   Encounter Diagnosis   Name Primary?    New ACL tear, right, subsequent encounter    Precautions: Follow protocol: TTWB per MD orders  The brace will be locked in extension for ambulation and patient can flex to 90 deg only for the first several weeks.     Time in: 8:05  Time Out: 9:10  Total Treatment Time: 55  Group Time: 30      Subjective:    Carlos reports improvement of symptoms, and getting out of the house more. Easier getting in and out of the house as well. Continued assistance from family members and spouse. No falls. Patient reports their pain to be 5/10 on a 0-10 scale with 0 being no pain and 10 being the worst pain imaginable.    Objective    Patient received individual therapy to increase strength, endurance, ROM, flexibility, posture and core stabilization with 0 patients with activities as follows:     Carlos received therapeutic exercises to develop strength, endurance, ROM, flexibility, posture and core stabilization for 55 minutes (30 min 1:1) including:   -Stairs: unable to demonstrate due to weight-bearing status, unsafe at this time.  -seated: right knee flexion, gravity assisted x 5 minutes    -NMES: right quad( VMO, RF) x 15 minutes, 10/20 cycle for quad strengthening purposes/ neuromuscular education.  -quad sets 5 x 30" with strap, towel under ankle    -glut sets 2/10  -patella mobility(4-way)  -heel slides AAROM: with strap with therapist assistance in pain-free range    -supine: hip adduction/abduction 2/10 on sliding board     -PROM: right knee extension, flexion     -cold pack x 10 minutes to right knee for swelling,muscle soreness purposes.     Gait training: with bilateral crutches, emphasis on TTWB of RLE and WB through hands on crutches. Knee brace remains locked in extension. SBA    Transfers: SBA with sit-to-stand with bilateral crutches, knee locked in " extension.     -Negotiating stairs:  Patient declined due to muscle soreness and would like to attempt at a later time.    Written Home Exercises Provided: Continuation of current written HEP. Educated pt on increased compliance to quad sets and passive knee flexion with use of belt or dog leash for assistance.  Pt demo good understanding of the education provided. Lavidia demonstrated good return demonstration of activities.     Assessment:   Pt dem overall good tolerance to treatment session this date. She continues with R quad weakness and decreased muscle recruitment however knee extension is improving with mild increase of comfort. Improved R knee flexion to 70 deg with heel slides and Brian of PTA. Demonstrates gait abnormality with bilateral crutches, yet improved slightly with verbal cues for TTWB of R LE. Pt gait improved with attempts of TTWB yet slow pace continues. Passive mobility and quad tone is slowly improving.      Pt will continue to benefit from skilled PT intervention. Medical Necessity is demonstrated by:  Weakness.    Patient is making good progress towards established goals.    Plan:  Continue with established Plan of Care towards PT goals.

## 2017-03-24 ENCOUNTER — OFFICE VISIT (OUTPATIENT)
Dept: ORTHOPEDICS | Facility: CLINIC | Age: 27
End: 2017-03-24
Payer: MEDICAID

## 2017-03-24 VITALS
BODY MASS INDEX: 30.43 KG/M2 | HEART RATE: 77 BPM | DIASTOLIC BLOOD PRESSURE: 80 MMHG | HEIGHT: 60 IN | SYSTOLIC BLOOD PRESSURE: 116 MMHG | WEIGHT: 155 LBS

## 2017-03-24 DIAGNOSIS — Z98.890 STATUS POST LATERAL MENISCUS REPAIR: ICD-10-CM

## 2017-03-24 DIAGNOSIS — Z98.890 STATUS POST MEDIAL MENISCUS REPAIR: ICD-10-CM

## 2017-03-24 DIAGNOSIS — Z98.890 S/P ACL RECONSTRUCTION: Primary | ICD-10-CM

## 2017-03-24 DIAGNOSIS — G89.18 POST-OP PAIN: ICD-10-CM

## 2017-03-24 PROCEDURE — 99024 POSTOP FOLLOW-UP VISIT: CPT | Mod: ,,, | Performed by: ORTHOPAEDIC SURGERY

## 2017-03-24 PROCEDURE — 99213 OFFICE O/P EST LOW 20 MIN: CPT | Mod: PBBFAC,PO | Performed by: ORTHOPAEDIC SURGERY

## 2017-03-24 PROCEDURE — 99999 PR PBB SHADOW E&M-EST. PATIENT-LVL III: CPT | Mod: PBBFAC,,, | Performed by: ORTHOPAEDIC SURGERY

## 2017-03-26 PROBLEM — S83.519A NEW ACL TEAR: Status: RESOLVED | Noted: 2017-03-09 | Resolved: 2017-03-26

## 2017-03-26 PROBLEM — S83.271A COMPLEX TEAR OF LATERAL MENISCUS OF RIGHT KNEE AS CURRENT INJURY: Status: RESOLVED | Noted: 2017-03-09 | Resolved: 2017-03-26

## 2017-03-26 PROBLEM — S83.249A ACUTE MEDIAL MENISCUS TEAR: Status: RESOLVED | Noted: 2017-03-09 | Resolved: 2017-03-26

## 2017-03-27 NOTE — PROGRESS NOTES
Subjective:          Chief Complaint: Carlos Coulter is a 26 y.o. female who had concerns including Pain of the Right Knee.    HPI Comments: Ms. Coulter is here for f/u after her right knee surgery. She is using her brace and crutches as directed and attending therapy. Pain: 5/10.    PREOPERATIVE DIAGNOSES:   1. Right knee anterior cruciate ligament tear.   2. Right knee medial meniscus tear.   3. Right knee lateral meniscus tear      POSTOPERATIVE DIAGNOSES:   1. Right knee anterior cruciate ligament tear.   2. Right knee medial meniscus tear.   3. Right knee lateral meniscus tear.      PROCEDURES:   1. Right knee anterior cruciate ligament reconstruction with ipsilateral   hamstring autograft AND gracilis allograft.   2. Right knee arthroscopic medial and lateral meniscus repair with lateral meniscus root repair  3. Right knee arthroscopic chondroplasty      SURGEON: Kalen Kaufman MD      ASSISTANTS: HALEY Mohan      COMPLICATIONS: None.       POSITION: Supine.       ANESTHESIA: General endotracheal plus right lower extremity femoral canal block.       COMPLICATIONS: None.       EBL: 20cc     EXAMINATION UNDER ANESTHESIA: Right knee, full range of motion, grade II B   positive Lachman, 2+ pivot shift. No opening to varus and mild valgus stress.       ARTHROSCOPIC FINDINGS:   1. Complete tearing, anterior cruciate ligament.   2. Medial meniscus posterior horn and body tear  3. Lateral meniscus posterior horn and meniscus root tear  4. Patellar chondromalacia Grade II  5. Medial femoral condyle chondromalacia Grade II      IMPLANTS:  RIDGID LOOP ADJUSTABLE BUTTON (STANDARD)  6 OMNI-SPAN MENISCAL REPAIR SUTURES  BIO-INTRAFIX TIBIAL SHEATH (LARGE)  BIO-INTRAFIX TAPERED SCREW (7-9X30MM)  MSKTF: Gracillis Tendon  6.5x25 mm cancellous screw  17mm spiked washer  #2 Orthocord    Pain   Associated symptoms include joint swelling. Pertinent negatives include no chills or fever.       Review of  Systems   Constitution: Negative for chills and fever.   Musculoskeletal: Positive for joint pain and joint swelling.   All other systems reviewed and are negative.                  Objective:        General: Carlos is well-developed, well-nourished, appears stated age, in no acute distress, alert and oriented to time, place and person.     General    Vitals reviewed.  Constitutional: She is oriented to person, place, and time. She appears well-developed and well-nourished. No distress.   HENT:   Head: Normocephalic and atraumatic.   Nose: Nose normal.   Eyes: Pupils are equal, round, and reactive to light.   Cardiovascular: Normal rate.    Pulmonary/Chest: Effort normal.   Neurological: She is alert and oriented to person, place, and time.   Psychiatric: She has a normal mood and affect. Her behavior is normal. Judgment and thought content normal.     General Musculoskeletal Exam   Gait: antalgic       Right Knee Exam     Inspection   Erythema: absent  Scars: present  Swelling: absent  Effusion: present  Deformity: deformity  Bruising: present    Tenderness   The patient is tender to palpation of the medial joint line, lateral joint line and pes anserinus.    Range of Motion   Extension:  0 normal   Flexion: abnormal     Tests   Patella   Patellar Apprehension: negative  Passive Patellar Tilt: neutral  Patellar Tracking: normal  Patellar Glide (quadrants): Lateral - 1   Medial - 1  Q-Angle at 90 degrees: normal    Other   Sensation: normal    Comments:  Sutures removed; incisions healing well; no signs of infection    Vascular Exam     Right Pulses  Dorsalis Pedis:      2+  Posterior Tibial:      2+        Edema  Right Lower Leg: absent              Assessment:       Encounter Diagnoses   Name Primary?    S/P ACL reconstruction Yes    Status post lateral meniscus repair     Status post medial meniscus repair     Post-op pain           Plan:         Continue with brace locked in extension and TTWB with  crutches  Continue with therapy  F/U in 2 weeks or sooner if needed  Can shower

## 2017-03-28 ENCOUNTER — CLINICAL SUPPORT (OUTPATIENT)
Dept: REHABILITATION | Facility: HOSPITAL | Age: 27
End: 2017-03-28
Attending: ORTHOPAEDIC SURGERY
Payer: MEDICAID

## 2017-03-28 DIAGNOSIS — M25.561 ACUTE PAIN OF RIGHT KNEE: Primary | ICD-10-CM

## 2017-03-28 PROCEDURE — 97110 THERAPEUTIC EXERCISES: CPT | Mod: PN

## 2017-03-28 NOTE — PROGRESS NOTES
"Name: Carlos Coulter  [facility] Number: [id number]  Date of Treatment: 03/28/2017   Diagnosis:   No diagnosis found.Precautions: Follow protocol: Continued TTWB per MD orders  The brace will be locked in extension for ambulation and patient can flex to 90 deg only for the first several weeks.     Time in: 9:06  Time Out: 10:10  Total Treatment Time: 55  Group Time: 30      Subjective:    Carlos reports improvement of symptoms, is able to put more weight on the R LE per MD. Was able to climb stairs over the weekend. She reports pain to be 3/10 on a 0-10 scale with 0 being no pain and 10 being the worst pain imaginable.    Objective    Patient received individual therapy to increase strength, endurance, ROM, flexibility, posture and core stabilization with 0 patients with activities as follows:     Carlos received therapeutic exercises to develop strength, endurance, ROM, flexibility, posture and core stabilization for 55 minutes (30 min 1:1) including:   -Stairs: unable to demonstrate due to weight-bearing status, unsafe at this time.  -seated: right knee flexion, gravity assisted x 5 minutes    -NMES: right quad( VMO, RF) x 15 minutes, 10/20 cycle for quad strengthening purposes/ neuromuscular education.  -quad sets 5 x 30" with strap, towel under ankle  -glut sets 2/10 (progress to HEP)  -patella mobility(4-way)  -heel slides AAROM: with strap with therapist assistance in pain-free range    -supine: hip adduction/abduction 2/10 on sliding board     -PROM: right knee extension, flexion      Gait training: with bilateral crutches, emphasis on TTWB of RLE and WB through hands on crutches. Knee brace remains locked in extension.  Stair training: with use of L handrail and crutches on contralateral side, step-to gait, x 2 with verbal cues for "up with the good, down with the bad"    Pt received cold pack x 10 minutes to right knee for swelling,muscle soreness purposes.    Written Home Exercises Provided: " Continuation of current written HEP. Educated pt on increased compliance to quad sets and passive knee flexion with use of belt or dog leash for assistance. Pt also instructed to avoid ladders at this time for safety concerns and protocol precautions.  Pt demo good understanding of the education provided. Carlos demonstrated good return demonstration of activities.     Assessment:   Carlos tolerated treatment session well without increase of soreness or pain. Mild tightness reported peripatellar as well as posterior knee with quad set and passive extension. Active extension to 0 deg at end of session. She continues with R quad weakness and decreased muscle recruitment with mild compensation of gluteals. Mild improvement following NMES and verbal/manual cues for quad recruitment. She demonstrates gait abnormality with bilateral crutches with ambulation and negotiating stairs, yet improved slightly with verbal cues; improved at end of session. Passive mobility and quad tone is slowly improving.      Pt will continue to benefit from skilled PT intervention. Medical Necessity is demonstrated by:  Weakness.    Patient is making good progress towards established goals.    Plan:  Continue with established Plan of Care towards PT goals.

## 2017-03-30 ENCOUNTER — CLINICAL SUPPORT (OUTPATIENT)
Dept: REHABILITATION | Facility: HOSPITAL | Age: 27
End: 2017-03-30
Attending: ORTHOPAEDIC SURGERY
Payer: MEDICAID

## 2017-03-30 DIAGNOSIS — M25.561 ACUTE PAIN OF RIGHT KNEE: Primary | ICD-10-CM

## 2017-03-30 PROCEDURE — 97110 THERAPEUTIC EXERCISES: CPT | Mod: PN | Performed by: PHYSICAL THERAPIST

## 2017-03-30 NOTE — PROGRESS NOTES
"[id number]  Date of Treatment: 03/30/2017   Diagnosis:   Encounter Diagnosis   Name Primary?    Acute pain of right knee Yes      S/P ACL reconstruction, 3/9/2017     Status post lateral meniscus repair      Status post medial meniscus repair      Precautions: Follow protocol: Continued TTWB per MD orders  The brace will be locked in extension for ambulation and patient can flex to 90 deg only for the first several weeks  Per MD:Continue with brace locked in extension and TTWB with crutches    Time in: 0900  Time Out: 1000  Total Treatment Time: 55  Group Time: 0      Subjective:    Carlos reports improvement of symptoms.  Patient reports their pain to be 0/10 on a 0-10 scale with 0 being no pain and 10 being the worst pain imaginable.    Objective    Patient received individual therapy to increase strength, endurance, ROM, flexibility and posture with 0 patients with activities as follows:     Carlos received therapeutic exercises to develop strength, endurance, ROM, flexibility and posture for 55 minutes including:     -seated: right knee flexion, gravity assisted x 5 minutes     -NMES: right quad( VMO, RF) x 15 minutes, 10/30 cycle for quad strengthening purposes/ neuromuscular education.  -quad sets 5 x 30" with strap, towel under ankle  -SLR (R) 3/10  -glut sets 2/10 (progress to HEP)    -patella mobility(4-way)    -heel slides with board/strap AAROM: x 30  -S/L: hip abduction/adduction 3/10  -prone hip extension 3/10 (R)     -PROM: right knee extension, flexion    -prone knee hangs x 10 minutes, 3#      Gait training: with bilateral crutches, emphasis on TTWB of RLE and WB through hands on crutches. Knee brace remains locked in extension.    Stair training: with use of L handrail and crutches on contralateral side, step-to gait, x 2 with verbal cues for "up with the good, down with the bad" (Previous)     Pt received cold pack x 10 minutes to right knee for " swelling,muscle soreness purposes.    Written Home Exercises Provided: Continuation  Pt demo good understanding of the education provided. Carlos demonstrated good return demonstration of activities.     Assessment:   - PROM: right knee flexion 90 degrees  - ambulating with RLE locked in extension per MD orders, TTWB  - quad atrophy, deficits noted  - Minimal extension lag to RLE     Pt will continue to benefit from skilled PT intervention. Medical Necessity is demonstrated by:  Fall Risk, Unable to participate in daily activities, Continued inability to participate in vocational pursuits, Pain limits function of effected part for some activities, Unable to participate fully in daily activities, Requires skilled supervision to complete and progress HEP, Weakness and Edema.    Patient is making good progress towards established goals.    New/Revised goals:  1. Pt will initiate HEP addressing right knee extension to 0 degrees for ambulatory purposes.  2. Pt will present with increased right quad MMT by one half grade for increased ease with functional ADLs.  3. Pt will report decreased pain to </= 5/10  4. Pt will improve right knee passive flexion to 90 degrees in pain-free range for mobility purposes.  5. Patient able to negotiate 4 steps modified independently without painful limitations. Not Met due to TTWB     Plan:  Continue with established Plan of Care towards PT goals.

## 2017-04-04 ENCOUNTER — CLINICAL SUPPORT (OUTPATIENT)
Dept: REHABILITATION | Facility: HOSPITAL | Age: 27
End: 2017-04-04
Attending: ORTHOPAEDIC SURGERY
Payer: MEDICAID

## 2017-04-04 DIAGNOSIS — M25.561 ACUTE PAIN OF RIGHT KNEE: Primary | ICD-10-CM

## 2017-04-04 PROCEDURE — 97110 THERAPEUTIC EXERCISES: CPT | Mod: PN

## 2017-04-04 NOTE — PROGRESS NOTES
"Name: Carlos Lopez [facility] Number: [id number]  Date of Treatment: 04/04/2017   Diagnosis:   Encounter Diagnosis   Name Primary?    Acute pain of right knee Yes      S/P ACL reconstruction, 3/9/2017     Status post lateral meniscus repair      Status post medial meniscus repair      Precautions: Follow protocol: Continued TTWB per MD orders  The brace will be locked in extension for ambulation and patient can flex to 90 deg only for the first several weeks  Per MD:Continue with brace locked in extension and TTWB with crutches    Time in: 12:45  Time Out: 1:55  Total Treatment Time: 65  Group Time: 30      Subjective:    Carlos reports improvement of symptoms.  Patient reports their pain to be 0/10 on a 0-10 scale with 0 being no pain and 10 being the worst pain imaginable.    Objective    Patient received individual therapy to increase strength, endurance, ROM, flexibility and posture with 0 patients with activities as follows:     Carlos received therapeutic exercises to develop strength, endurance, ROM, flexibility and posture for 65 minutes (35 min 1:1) including:     -seated: right knee flexion, gravity assisted x 5 minutes     -NMES: right quad( VMO, RF) x 15 minutes, 10/30 cycle for quad strengthening purposes/ neuromuscular education.  -quad sets 5 x 30" with strap, towel under ankle  -SLR (R) 3/10    -patella mobility(4-way)    -heel slides with board/strap AAROM: x 30  -S/L: hip abduction/adduction 3/10  -prone hip extension 3/10 (R)     -PROM: right knee extension, flexion    -prone knee hangs x 5 minutes, 3#      Gait training: with bilateral crutches, emphasis on TTWB of RLE and WB through hands on crutches. Knee brace remains locked in extension. (Previous)    Stair training: with use of L handrail and crutches on contralateral side, step-to gait, x 2 with verbal cues for "up with the good, down with the bad" (Previous)     Pt received cold pack x 10 minutes to right knee for " swelling,muscle soreness purposes.    Written Home Exercises Provided: Continuation of current HEP, emphasis on quad sets and heel slides.  Pt demo good understanding of the education provided. Carlos demonstrated good return demonstration of activities.     Assessment:   Carlos demonstrated good tolerance to treatment session this date. She continues with tightness of R knee flexion and limited quad recruitment. PROM: right knee flexion 93 degrees. She continues with ambulation with RLE locked in extension per MD orders, TTWB, emphasis on heel strike. Will benefit from continued strengthening.     Pt will continue to benefit from skilled PT intervention. Medical Necessity is demonstrated by:  Fall Risk, Unable to participate in daily activities, Continued inability to participate in vocational pursuits, Pain limits function of effected part for some activities, Unable to participate fully in daily activities, Requires skilled supervision to complete and progress HEP, Weakness and Edema.    Patient is making good progress towards established goals.    New/Revised goals:  1. Pt will initiate HEP addressing right knee extension to 0 degrees for ambulatory purposes.  2. Pt will present with increased right quad MMT by one half grade for increased ease with functional ADLs.  3. Pt will report decreased pain to </= 5/10  4. Pt will improve right knee passive flexion to 90 degrees in pain-free range for mobility purposes.  5. Patient able to negotiate 4 steps modified independently without painful limitations. Not Met due to TTWB     Plan:  Continue with established Plan of Care towards PT goals.

## 2017-04-06 ENCOUNTER — CLINICAL SUPPORT (OUTPATIENT)
Dept: REHABILITATION | Facility: HOSPITAL | Age: 27
End: 2017-04-06
Attending: ORTHOPAEDIC SURGERY
Payer: MEDICAID

## 2017-04-06 DIAGNOSIS — M25.561 ACUTE PAIN OF RIGHT KNEE: Primary | ICD-10-CM

## 2017-04-06 PROCEDURE — 97110 THERAPEUTIC EXERCISES: CPT | Mod: PN

## 2017-04-06 NOTE — PROGRESS NOTES
"Name: Carlos Coulter  [facility] Number: [id number]  Date of Treatment: 04/06/2017   Diagnosis:   Encounter Diagnosis   Name Primary?    Acute pain of right knee Yes      S/P ACL reconstruction, 3/9/2017     Status post lateral meniscus repair      Status post medial meniscus repair      Precautions: Follow protocol: Continued TTWB per MD orders  The brace will be locked in extension for ambulation and patient can flex to 90 deg only for the first several weeks  Per MD:Continue with brace locked in extension and TTWB with crutches    Time in: 10:18  Time Out: 11:13  Total Treatment Time: 40   Group Time: 20      Subjective:    Carlos reports increased pain in the knee from being busy at home and visiting family. States she has not been compliant with HEP due to having no free time this week. Patient reports their pain to be 5/10 on a 0-10 scale with 0 being no pain and 10 being the worst pain imaginable.    Objective    Patient received individual therapy to increase strength, endurance, ROM, flexibility and posture with 0 patients with activities as follows:     Carlos received therapeutic exercises to develop strength, endurance, ROM, flexibility and posture for 40 minutes (20 min 1:1) including:     -seated: right knee flexion, gravity assisted x 5 minutes     -NMES: right quad( VMO, RF) x 10 minutes, 10/30 cycle for quad strengthening purposes/ neuromuscular education.  -quad sets 5 x 30" with strap, towel under ankle  -SLR (R) 3/10    -patella mobility(4-way)    -heel slides with board/strap AAROM: x 30 (NP)  -S/L: hip abduction/adduction 3/10  -prone hip extension 3/10 (R)     -PROM: right knee extension, flexion    -prone knee hangs x 5 minutes, 3# (NP)      Gait training: with bilateral crutches, emphasis on TTWB of RLE and WB through hands on crutches. Knee brace remains locked in extension. (Previous)    Stair training: with use of L handrail and crutches on contralateral side, step-to gait, x 2 " "with verbal cues for "up with the good, down with the bad" (Previous)     Pt received cold pack x 10 minutes to right knee for swelling,muscle soreness purposes.    Written Home Exercises Provided: Continuation of current HEP, emphasis on increased frequency of quad sets and heel slides to improve functional ROM.  Pt demo good understanding of the education provided. Carlos demonstrated good return demonstration of activities.     Assessment:   Carlos tolerated completed exercises well this date without increased pain. She continues with limitation of R knee ROM and limited scar mobility. Slight improvement of R quad muscle recruitment with quad sets and NMES. She continues with ambulation with RLE locked in extension per MD orders, TTWB, emphasis on heel strike. Will benefit from continued strengthening and increased compliance with HEP.     Pt will continue to benefit from skilled PT intervention. Medical Necessity is demonstrated by:  Fall Risk, Unable to participate in daily activities, Continued inability to participate in vocational pursuits, Pain limits function of effected part for some activities, Unable to participate fully in daily activities, Requires skilled supervision to complete and progress HEP, Weakness and Edema.    Patient is making good progress towards established goals.    New/Revised goals:  1. Pt will initiate HEP addressing right knee extension to 0 degrees for ambulatory purposes.  2. Pt will present with increased right quad MMT by one half grade for increased ease with functional ADLs.  3. Pt will report decreased pain to </= 5/10  4. Pt will improve right knee passive flexion to 90 degrees in pain-free range for mobility purposes.  5. Patient able to negotiate 4 steps modified independently without painful limitations. Not Met due to TTWB     Plan:  Continue with established Plan of Care towards PT goals.   "

## 2017-04-07 ENCOUNTER — OFFICE VISIT (OUTPATIENT)
Dept: ORTHOPEDICS | Facility: CLINIC | Age: 27
End: 2017-04-07
Payer: MEDICAID

## 2017-04-07 VITALS
WEIGHT: 155 LBS | SYSTOLIC BLOOD PRESSURE: 106 MMHG | HEART RATE: 67 BPM | BODY MASS INDEX: 30.43 KG/M2 | HEIGHT: 60 IN | DIASTOLIC BLOOD PRESSURE: 72 MMHG

## 2017-04-07 DIAGNOSIS — Z98.890 STATUS POST LATERAL MENISCUS REPAIR: ICD-10-CM

## 2017-04-07 DIAGNOSIS — Z98.890 S/P ACL RECONSTRUCTION: Primary | ICD-10-CM

## 2017-04-07 DIAGNOSIS — M25.561 RIGHT KNEE PAIN, UNSPECIFIED CHRONICITY: ICD-10-CM

## 2017-04-07 PROCEDURE — 99213 OFFICE O/P EST LOW 20 MIN: CPT | Mod: PBBFAC,PO | Performed by: ORTHOPAEDIC SURGERY

## 2017-04-07 PROCEDURE — 99024 POSTOP FOLLOW-UP VISIT: CPT | Mod: ,,, | Performed by: ORTHOPAEDIC SURGERY

## 2017-04-07 PROCEDURE — 99999 PR PBB SHADOW E&M-EST. PATIENT-LVL III: CPT | Mod: PBBFAC,,, | Performed by: ORTHOPAEDIC SURGERY

## 2017-04-07 NOTE — MR AVS SNAPSHOT
[facility]  [address] [facility] [address]  [facility] [address]  Phone: [phone]                  Carlos Coulter   2017 8:15 AM   Office Visit    Description:  Female : 1990   Provider:  Kalen Kaufman MD   Department:  [facility]           Reason for Visit     Right Knee - Pain           Diagnoses this Visit        Comments    S/P ACL reconstruction    -  Primary     Status post lateral meniscus repair         Right knee pain, unspecified chronicity                To Do List           Future Appointments        Provider Department Dept Phone    2017 12:00 PM Michele Trujillo, PT [place] [facility] [phone]    2017 2:00 PM Michele Trujillo, PT [place] [facility] [phone]    2017 2:00 PM Libra Malhotra PTA [place] [facility] [phone]    2017 2:00 PM Libra Malhotra PTA [place] [facility] [phone]    2017 11:15 AM Kalen Kaufman MD [facility] [phone]      Goals (5 Years of Data)              3/31/16    11/30/15    11/18/15    BMI is less than 25   Not on track  Not on track  Not on track      [facility]     [facility] Nurse Care Line -  Assistance  Unless otherwise directed by your provider, please contact [facility], our nurse care line that is available for  assistance.     Registered nurses in the [facility] provide: appointment scheduling, clinical advisement, health education, and other advisory services.  Call: [phone] (toll free)               Medications           Message regarding Medications     Verify the changes and/or additions to your medication regime listed below are the same as discussed with your clinician today.  If any of these changes or additions are incorrect, please notify your healthcare provider.             Verify that the below list of medications is an accurate representation of the medications you  are currently taking.  If none reported, the list may be blank. If incorrect, please contact your healthcare provider. Carry this list with you in case of emergency.           Current Medications     albuterol (PROAIR HFA) 90 mcg/actuation inhaler INHALE 1 PUFF EVERY 4 HOURS PRN for Wheezing    albuterol (PROVENTIL) 2.5 mg /3 mL (0.083 %) nebulizer solution Take 3 mLs (2.5 mg total) by nebulization every 6 (six) hours as needed for Wheezing.    aripiprazole (ABILIFY) 5 MG Tab Take 5 mg by mouth once daily.    clobetasol (TEMOVATE) 0.05 % cream Apply 1 application topically daily as needed.     clobetasol (TEMOVATE) 0.05 % cream APPLY EXTERNALLY TO THE AFFECTED AREA TWICE DAILY    docusate sodium (COLACE) 100 MG capsule Take 1 capsule (100 mg total) by mouth 2 (two) times daily.    FLONASE ALLERGY RELIEF 50 mcg/actuation nasal spray SHAKE WELL AND USE 2 SPRAYS IN EACH NOSTRIL EVERY DAY    inhalation device (BREATHERITE VALVED MDI SPACER) Use as directed for inhalation.    levothyroxine (SYNTHROID) 88 MCG tablet TAKE 1 TABLET BY MOUTH EVERY MORNING BEFORE BREAKFAST    lisdexamfetamine (VYVANSE) 40 MG Cap Take 40 mg by mouth once daily.    medroxyPROGESTERone (PROVERA) 10 MG tablet TAKE 1 TABLET(10 MG) BY MOUTH EVERY DAY    montelukast (SINGULAIR) 10 mg tablet Take 1 tablet (10 mg total) by mouth once daily.    oxycodone-acetaminophen (PERCOCET) 5-325 mg per tablet Take 1 tablet by mouth every 8 (eight) hours as needed for Pain.    promethazine (PHENERGAN) 25 MG tablet Take 1 tablet (25 mg total) by mouth every 6 (six) hours as needed for Nausea.    sucralfate (CARAFATE) 1 gram tablet Take 1 tablet (1 g total) by mouth 4 (four) times daily.    trazodone (DESYREL) 100 MG tablet Take 1 tablet by mouth every evening.    triamcinolone acetonide 0.1% (KENALOG) 0.1 % ointment AAA bid x 2 weeks then prn, avoid chronic use    pantoprazole (PROTONIX) 40 MG tablet Take 1 tablet (40 mg total) by mouth once daily. Before breakfast            Clinical Reference Information           Your Vitals Were     BP Pulse Height Weight Last Period BMI    106/72 67 5' (1.524 m) 70.3 kg (154 lb 15.7 oz) 02/07/2017 30.27 kg/m2      Blood Pressure          Most Recent Value    BP  106/72      Allergies as of 4/7/2017     Grapefruit    Pineapple    Augmentin [Amoxicillin-pot Clavulanate]    Penicillins    Tamiflu [Oseltamivir]      Immunizations Administered on Date of Encounter - 4/7/2017     None      Language Assistance Services     ATTENTION: Language assistance services are available, free of charge. Please call [phone].      ATENCIÓN: Si habla español, tiene a shea disposición servicios gratuitos de asistencia lingüística. Llame al [phone].     CHÚ Ý: N?u b?n nói Ti?ng Vi?t, có các d?ch v? h? tr? ngôn ng? mi?n phí dành cho b?n. G?i s? [phone].         [facility] complies with applicable Federal civil rights laws and does not discriminate on the basis of race, color, national origin, age, disability, or sex.

## 2017-04-08 NOTE — PROGRESS NOTES
Subjective:          Chief Complaint: Carlos Coulter is a 26 y.o. female who had concerns including Pain of the Right Knee.    HPI Comments: Ms. Coulter is here for f/u after her right knee surgery. She is using her brace and crutches as directed and attending therapy. Pain: 0/10.    PREOPERATIVE DIAGNOSES:   1. Right knee anterior cruciate ligament tear.   2. Right knee medial meniscus tear.   3. Right knee lateral meniscus tear      POSTOPERATIVE DIAGNOSES:   1. Right knee anterior cruciate ligament tear.   2. Right knee medial meniscus tear.   3. Right knee lateral meniscus tear.      PROCEDURES:   1. Right knee anterior cruciate ligament reconstruction with ipsilateral   hamstring autograft AND gracilis allograft.   2. Right knee arthroscopic medial and lateral meniscus repair with lateral meniscus root repair  3. Right knee arthroscopic chondroplasty      SURGEON: Kalen Kaufman MD      ASSISTANTS: HALEY Mohan      COMPLICATIONS: None.       POSITION: Supine.       ANESTHESIA: General endotracheal plus right lower extremity femoral canal block.       COMPLICATIONS: None.       EBL: 20cc     EXAMINATION UNDER ANESTHESIA: Right knee, full range of motion, grade II B   positive Lachman, 2+ pivot shift. No opening to varus and mild valgus stress.       ARTHROSCOPIC FINDINGS:   1. Complete tearing, anterior cruciate ligament.   2. Medial meniscus posterior horn and body tear  3. Lateral meniscus posterior horn and meniscus root tear  4. Patellar chondromalacia Grade II  5. Medial femoral condyle chondromalacia Grade II      IMPLANTS:  RIDGID LOOP ADJUSTABLE BUTTON (STANDARD)  6 OMNI-SPAN MENISCAL REPAIR SUTURES  BIO-INTRAFIX TIBIAL SHEATH (LARGE)  BIO-INTRAFIX TAPERED SCREW (7-9X30MM)  MSKTF: Gracillis Tendon  6.5x25 mm cancellous screw  17mm spiked washer  #2 Orthocord    Pain   Associated symptoms include joint swelling. Pertinent negatives include no chills or fever.       Review of  Systems   Constitution: Negative for chills and fever.   Musculoskeletal: Positive for joint swelling and stiffness. Negative for joint pain.   All other systems reviewed and are negative.                  Objective:        General: Carlos is well-developed, well-nourished, appears stated age, in no acute distress, alert and oriented to time, place and person.     General    Vitals reviewed.  Constitutional: She is oriented to person, place, and time. She appears well-developed and well-nourished. No distress.   HENT:   Head: Normocephalic and atraumatic.   Nose: Nose normal.   Eyes: Pupils are equal, round, and reactive to light.   Cardiovascular: Normal rate.    Pulmonary/Chest: Effort normal.   Neurological: She is alert and oriented to person, place, and time.   Psychiatric: She has a normal mood and affect. Her behavior is normal. Judgment and thought content normal.     General Musculoskeletal Exam   Gait: antalgic       Right Knee Exam     Inspection   Erythema: absent  Scars: present  Swelling: absent  Effusion: present  Deformity: deformity  Bruising: absent    Tenderness   The patient is experiencing no tenderness.         Range of Motion   Extension:  0 normal   Flexion:  90 abnormal     Tests   Ligament Examination Lachman: normal (-1 to 2mm)   Patella   Patellar Apprehension: negative  Passive Patellar Tilt: neutral  Patellar Tracking: normal  Patellar Glide (quadrants): Lateral - 1   Medial - 1  Q-Angle at 90 degrees: normal    Other   Sensation: normal    Comments:  Incisions healed; no signs of infection    Vascular Exam     Right Pulses  Dorsalis Pedis:      2+  Posterior Tibial:      2+        Edema  Right Lower Leg: absent              Assessment:       Encounter Diagnoses   Name Primary?    S/P ACL reconstruction Yes    Status post lateral meniscus repair     Right knee pain, unspecified chronicity           Plan:         Continue with brace locked in extension for ambulation and TTWB with  crutches  Continue with therapy per protocol  F/U in 2 weeks or sooner if needed

## 2017-04-10 ENCOUNTER — OFFICE VISIT (OUTPATIENT)
Dept: FAMILY MEDICINE | Facility: CLINIC | Age: 27
End: 2017-04-10
Payer: MEDICAID

## 2017-04-10 VITALS
SYSTOLIC BLOOD PRESSURE: 122 MMHG | RESPIRATION RATE: 16 BRPM | WEIGHT: 167.31 LBS | HEART RATE: 74 BPM | BODY MASS INDEX: 32.85 KG/M2 | DIASTOLIC BLOOD PRESSURE: 76 MMHG | HEIGHT: 60 IN

## 2017-04-10 DIAGNOSIS — R68.89 COLD INTOLERANCE: Primary | ICD-10-CM

## 2017-04-10 LAB
ALBUMIN SERPL BCP-MCNC: 3.3 G/DL
ALP SERPL-CCNC: 122 U/L
ALT SERPL W/O P-5'-P-CCNC: 15 U/L
ANION GAP SERPL CALC-SCNC: 12 MMOL/L
AST SERPL-CCNC: 18 U/L
BASOPHILS # BLD AUTO: 0.02 K/UL
BASOPHILS NFR BLD: 0.3 %
BILIRUB SERPL-MCNC: 0.4 MG/DL
BUN SERPL-MCNC: 10 MG/DL
CALCIUM SERPL-MCNC: 9.6 MG/DL
CHLORIDE SERPL-SCNC: 102 MMOL/L
CO2 SERPL-SCNC: 21 MMOL/L
CREAT SERPL-MCNC: 0.8 MG/DL
DIFFERENTIAL METHOD: NORMAL
EOSINOPHIL # BLD AUTO: 0.1 K/UL
EOSINOPHIL NFR BLD: 1.8 %
ERYTHROCYTE [DISTWIDTH] IN BLOOD BY AUTOMATED COUNT: 13.5 %
EST. GFR  (AFRICAN AMERICAN): >60 ML/MIN/1.73 M^2
EST. GFR  (NON AFRICAN AMERICAN): >60 ML/MIN/1.73 M^2
GLUCOSE SERPL-MCNC: 86 MG/DL
HCT VFR BLD AUTO: 38.3 %
HGB BLD-MCNC: 13.1 G/DL
LYMPHOCYTES # BLD AUTO: 2.1 K/UL
LYMPHOCYTES NFR BLD: 34.4 %
MCH RBC QN AUTO: 29.1 PG
MCHC RBC AUTO-ENTMCNC: 34.2 %
MCV RBC AUTO: 85 FL
MONOCYTES # BLD AUTO: 0.5 K/UL
MONOCYTES NFR BLD: 8.1 %
NEUTROPHILS # BLD AUTO: 3.4 K/UL
NEUTROPHILS NFR BLD: 55.1 %
PLATELET # BLD AUTO: 265 K/UL
PMV BLD AUTO: 10.6 FL
POTASSIUM SERPL-SCNC: 4.1 MMOL/L
PROT SERPL-MCNC: 7.1 G/DL
RBC # BLD AUTO: 4.5 M/UL
SODIUM SERPL-SCNC: 135 MMOL/L
T4 FREE SERPL-MCNC: 1.26 NG/DL
TSH SERPL DL<=0.005 MIU/L-ACNC: 4.67 UIU/ML
WBC # BLD AUTO: 6.2 K/UL

## 2017-04-10 PROCEDURE — 83036 HEMOGLOBIN GLYCOSYLATED A1C: CPT

## 2017-04-10 PROCEDURE — 80053 COMPREHEN METABOLIC PANEL: CPT

## 2017-04-10 PROCEDURE — 84443 ASSAY THYROID STIM HORMONE: CPT

## 2017-04-10 PROCEDURE — 84439 ASSAY OF FREE THYROXINE: CPT

## 2017-04-10 PROCEDURE — 99213 OFFICE O/P EST LOW 20 MIN: CPT | Mod: S$GLB,,, | Performed by: FAMILY MEDICINE

## 2017-04-10 PROCEDURE — 85025 COMPLETE CBC W/AUTO DIFF WBC: CPT

## 2017-04-10 PROCEDURE — 36415 COLL VENOUS BLD VENIPUNCTURE: CPT | Mod: S$GLB,,, | Performed by: FAMILY MEDICINE

## 2017-04-10 NOTE — MR AVS SNAPSHOT
[facility]  [address] [address] [address]  [facility] [address]  Phone: [phone]  Fax: [phone]                  Carlos Coulter   4/10/2017 11:10 AM   Office Visit    Description:  Female : 1990   Provider:  Fanta Schuster MD   Department:  [facility]           Diagnoses this Visit        Comments    Cold intolerance    -  Primary            To Do List           Future Appointments        Provider Department Dept Phone    2017 12:00 PM Michele Trujillo, PT [facility] [phone]    2017 2:00 PM Michele Trujillo, PT [facility] [phone]    2017 2:00 PM Libra Malhotra, PTA [facility] [phone]    2017 2:00 PM Libra Malhotra PTA [facility] [phone]    2017 11:15 AM Kalen Kaufman MD [facility] [phone]      Goals (5 Years of Data)              3/31/16    11/30/15    11/18/15    BMI is less than 25   Not on track  Not on track  Not on track      [facility]     [facility] Nurse Care Line - 24 Assistance  Unless otherwise directed by your provider, please contact [facility], our nurse care line that is available for  assistance.     Registered nurses in the [facility] provide: appointment scheduling, clinical advisement, health education, and other advisory services.  Call: [phone] (toll free)               Medications           Message regarding Medications     Verify the changes and/or additions to your medication regime listed below are the same as discussed with your clinician today.  If any of these changes or additions are incorrect, please notify your healthcare provider.             Verify that the below list of medications is an accurate representation of the medications you are currently taking.  If none reported, the list may be blank. If incorrect, please contact your  healthcare provider. Carry this list with you in case of emergency.           Current Medications     albuterol (PROAIR HFA) 90 mcg/actuation inhaler INHALE 1 PUFF EVERY 4 HOURS PRN for Wheezing    albuterol (PROVENTIL) 2.5 mg /3 mL (0.083 %) nebulizer solution Take 3 mLs (2.5 mg total) by nebulization every 6 (six) hours as needed for Wheezing.    aripiprazole (ABILIFY) 5 MG Tab Take 5 mg by mouth once daily.    clobetasol (TEMOVATE) 0.05 % cream Apply 1 application topically daily as needed.     docusate sodium (COLACE) 100 MG capsule Take 1 capsule (100 mg total) by mouth 2 (two) times daily.    FLONASE ALLERGY RELIEF 50 mcg/actuation nasal spray SHAKE WELL AND USE 2 SPRAYS IN EACH NOSTRIL EVERY DAY    inhalation device (BREATHERITE VALVED MDI SPACER) Use as directed for inhalation.    levothyroxine (SYNTHROID) 88 MCG tablet TAKE 1 TABLET BY MOUTH EVERY MORNING BEFORE BREAKFAST    lisdexamfetamine (VYVANSE) 40 MG Cap Take 40 mg by mouth once daily.    medroxyPROGESTERone (PROVERA) 10 MG tablet TAKE 1 TABLET(10 MG) BY MOUTH EVERY DAY    montelukast (SINGULAIR) 10 mg tablet Take 1 tablet (10 mg total) by mouth once daily.    oxycodone-acetaminophen (PERCOCET) 5-325 mg per tablet Take 1 tablet by mouth every 8 (eight) hours as needed for Pain.    promethazine (PHENERGAN) 25 MG tablet Take 1 tablet (25 mg total) by mouth every 6 (six) hours as needed for Nausea.    sucralfate (CARAFATE) 1 gram tablet Take 1 tablet (1 g total) by mouth 4 (four) times daily.    trazodone (DESYREL) 100 MG tablet Take 1 tablet by mouth every evening.    triamcinolone acetonide 0.1% (KENALOG) 0.1 % ointment AAA bid x 2 weeks then prn, avoid chronic use    pantoprazole (PROTONIX) 40 MG tablet Take 1 tablet (40 mg total) by mouth once daily. Before breakfast           Clinical Reference Information           Your Vitals Were     BP Pulse Resp Height Weight Last Period    122/76 74 16 5' (1.524 m) 75.9 kg (167 lb 5.3 oz) 04/03/2017    BMI                 32.68 kg/m2          Blood Pressure          Most Recent Value    BP  122/76      Allergies as of 4/10/2017     Grapefruit    Pineapple    Augmentin [Amoxicillin-pot Clavulanate]    Penicillins    Tamiflu [Oseltamivir]      Immunizations Administered on Date of Encounter - 4/10/2017     None      Orders Placed During Today's Visit      Normal Orders This Visit    CBC auto differential     Comprehensive metabolic panel     Hemoglobin A1c     TSH       Language Assistance Services     ATTENTION: Language assistance services are available, free of charge. Please call [phone].      ATENCIÓN: Si josemanuel taylorthierno, tiene a shea disposición servicios gratuitos de asistencia lingüística. Llame al [phone].     MARIA G Ý: N?u b?n nói Ti?ng Vi?t, có các d?ch v? h? tr? ngôn ng? mi?n phí dành cho b?n. G?i s? [phone].         [facility] complies with applicable Federal civil rights laws and does not discriminate on the basis of race, color, national origin, age, disability, or sex.

## 2017-04-11 ENCOUNTER — CLINICAL SUPPORT (OUTPATIENT)
Dept: REHABILITATION | Facility: HOSPITAL | Age: 27
End: 2017-04-11
Attending: ORTHOPAEDIC SURGERY
Payer: MEDICAID

## 2017-04-11 ENCOUNTER — TELEPHONE (OUTPATIENT)
Dept: FAMILY MEDICINE | Facility: CLINIC | Age: 27
End: 2017-04-11

## 2017-04-11 DIAGNOSIS — E03.4 HYPOTHYROIDISM DUE TO ACQUIRED ATROPHY OF THYROID: Primary | ICD-10-CM

## 2017-04-11 DIAGNOSIS — M25.561 ACUTE PAIN OF RIGHT KNEE: Primary | ICD-10-CM

## 2017-04-11 LAB
ESTIMATED AVG GLUCOSE: 108 MG/DL
HBA1C MFR BLD HPLC: 5.4 %

## 2017-04-11 PROCEDURE — 97110 THERAPEUTIC EXERCISES: CPT | Mod: PN

## 2017-04-11 RX ORDER — LEVOTHYROXINE SODIUM 100 UG/1
100 TABLET ORAL DAILY
Qty: 30 TABLET | Refills: 1 | Status: SHIPPED | OUTPATIENT
Start: 2017-04-11 | End: 2017-06-07 | Stop reason: SDUPTHER

## 2017-04-11 NOTE — TELEPHONE ENCOUNTER
Pt called inquiring about new RX Levothyroxine. Instructed pt to take daily as written on RX. Pt voiced understanding. CLC

## 2017-04-11 NOTE — TELEPHONE ENCOUNTER
Pls notify pt:    Overall labs look good except for borderline thyroid levels.  Given your sx, let's increase the synthroid and see if this helps.  Recheck TSH in 6 weeks.

## 2017-04-11 NOTE — TELEPHONE ENCOUNTER
Called pt regarding labs. Pt verbalized understanding and will  synthroid 100mcg at [facility] today and we scheduled labs for 6 weeks out on 05/23 @ 10am. Pt verbalized understanding.

## 2017-04-11 NOTE — PROGRESS NOTES
"[id number]  Date of Treatment: 04/11/2017   Diagnosis:   Encounter Diagnosis   Name Primary?    Acute pain of right knee Yes      S/P ACL reconstruction, 3/9/2017     Status post lateral meniscus repair      Status post medial meniscus repair      Precautions: Follow protocol: Continued TTWB per MD orders  The brace will be locked in extension for ambulation and patient can flex to 90 deg only for the first several weeks  Per MD:Continue with brace locked in extension and TTWB with crutches    Time in: 10:00  Time Out: 11:04  Total Treatment Time: 55   Group Time: 0      Subjective:    Carlos reports increased pain in the knee this morning. Received good report from Dr. Kaufman, two more weeks in the crutches. States she has tried to be more compliant with her home exercises, but she is often too busy. Patient reports their pain to be 5/10 on a 0-10 scale with 0 being no pain and 10 being the worst pain imaginable.    Objective    Patient received individual therapy to increase strength, endurance, ROM, flexibility and posture with 0 patients with activities as follows:     Carlos received therapeutic exercises to develop strength, endurance, ROM, flexibility and posture for 55 minutes including:     -NMES: right quad( VMO, RF) x 10 minutes, 10/30 cycle for quad strengthening purposes/ neuromuscular education.  -quad sets 5 x 30" with strap, towel under ankle  -SLR (R) 3/10  -S/L: hip abduction/adduction 3/10    -patella mobility(4-way)    -heel slides with board/strap AAROM: x 30  -prone hip extension 3/10 (R)  -prone knee hangs x 5 minutes, 3#     -PROM: right knee extension, flexion  -seated: right knee flexion, gravity assisted x 5 minutes    Pt received cold pack x 10 minutes to right knee for swelling,muscle soreness purposes.    Written Home Exercises Provided: Continuation of current HEP, pt educated again on importance of quad strength and knee functional " ROM. Additional SLR in all 4 directions and prone hang.  Pt demo good understanding of the education provided. Carlos demonstrated good return demonstration of activities.     Assessment:   Carlos demonstrated overall good tolerance to treatment session however she continues with pain, weakness, and decreased ROM of R knee. Quad weakness, mild extensor lag, and guarding of R knee continues due to non-compliance with HEP. She continues with ambulation with RLE locked in extension per MD orders, TTWB, emphasis on heel strike. Will benefit from continued strengthening and increased compliance with HEP.     Pt will continue to benefit from skilled PT intervention. Medical Necessity is demonstrated by:  Fall Risk, Unable to participate in daily activities, Continued inability to participate in vocational pursuits, Pain limits function of effected part for some activities, Unable to participate fully in daily activities, Requires skilled supervision to complete and progress HEP, Weakness and Edema.    Patient is making good progress towards established goals.    New/Revised goals:  1. Pt will initiate HEP addressing right knee extension to 0 degrees for ambulatory purposes.  2. Pt will present with increased right quad MMT by one half grade for increased ease with functional ADLs.  3. Pt will report decreased pain to </= 5/10  4. Pt will improve right knee passive flexion to 90 degrees in pain-free range for mobility purposes.  5. Patient able to negotiate 4 steps modified independently without painful limitations. Not Met due to TTWB     Plan:  Continue with established Plan of Care towards PT goals.

## 2017-04-12 NOTE — PROGRESS NOTES
Subjective:       Patient ID: Carlos Coulter is a 26 y.o. female.    Chief Complaint: Chills    HPI   The patient is a 26-year-old who is here today because she feels cold all the time.  Her whole body feels cold.  She complains of feeling as if she is freezing all the time.  At times she has cold chills and sometimes even shivers out in the sun.  She feels cold and shivers during the day and the night.  This has been present for several days.  She denies any fevers or chills.  She denies any recent illness.  She wonders if this could be a sign of B12 deficiency or if her thyroid dose needs to be adjusted.  She denies any recent change in any of her medications or in her environment .  She denies any fevers.  She denies any change in her weight or her appetite.    Review of Systems   Constitutional: Negative for appetite change, chills, diaphoresis, fatigue, fever and unexpected weight change.   HENT: Negative for congestion, ear pain, postnasal drip, rhinorrhea, sinus pressure, sneezing, sore throat and trouble swallowing.    Eyes: Negative for pain, discharge and visual disturbance.   Respiratory: Negative for cough, chest tightness, shortness of breath and wheezing.    Cardiovascular: Negative for chest pain, palpitations and leg swelling.   Gastrointestinal: Negative for abdominal distention, abdominal pain, blood in stool, constipation, diarrhea, nausea and vomiting.   Endocrine: Positive for cold intolerance. Negative for heat intolerance, polydipsia, polyphagia and polyuria.   Genitourinary: Negative for difficulty urinating, dysuria and flank pain.   Musculoskeletal: Negative for arthralgias, joint swelling and myalgias.   Skin: Negative for rash and wound.       Objective:      Physical Exam   Constitutional: She is oriented to person, place, and time. She appears well-developed and well-nourished. No distress.   HENT:   Head: Normocephalic and atraumatic.   Right Ear: Hearing, tympanic  membrane, external ear and ear canal normal.   Left Ear: Hearing, tympanic membrane, external ear and ear canal normal.   Nose: Nose normal.   Mouth/Throat: Oropharynx is clear and moist and mucous membranes are normal. No oral lesions. No oropharyngeal exudate, posterior oropharyngeal edema or posterior oropharyngeal erythema.   Eyes: Conjunctivae, EOM and lids are normal. Pupils are equal, round, and reactive to light. No scleral icterus.   Neck: Normal range of motion. Neck supple. Carotid bruit is not present. No thyroid mass and no thyromegaly present.   Cardiovascular: Normal rate, regular rhythm and normal heart sounds.   No extrasystoles are present. PMI is not displaced.  Exam reveals no gallop.    No murmur heard.  Pulmonary/Chest: Effort normal and breath sounds normal. No accessory muscle usage. No respiratory distress.   Clear to auscultation bilaterally.   Abdominal: Soft. Normal appearance and bowel sounds are normal. She exhibits no abdominal bruit. There is no hepatosplenomegaly. There is no tenderness. There is no rebound.   Lymphadenopathy:        Head (right side): No submental and no submandibular adenopathy present.        Head (left side): No submental and no submandibular adenopathy present.        Right cervical: No superficial cervical, no deep cervical and no posterior cervical adenopathy present.       Left cervical: No superficial cervical, no deep cervical and no posterior cervical adenopathy present.        Right: No supraclavicular adenopathy present.        Left: No supraclavicular adenopathy present.   Neurological: She is alert and oriented to person, place, and time.   Skin: Skin is warm, dry and intact.   Psychiatric: She has a normal mood and affect.     Blood pressure 122/76, pulse 74, resp. rate 16, height 5' (1.524 m), weight 75.9 kg (167 lb 5.3 oz), last menstrual period 04/03/2017.Body mass index is 32.68 kg/(m^2).        A/P:  1)  cold intolerance.  New to me.  We will  check a TSH and CBC.  We will contact her with these results.  If she develops any new (fevers, night sweats, weight loss etc.) or worsening symptoms, she will let us know

## 2017-04-18 ENCOUNTER — CLINICAL SUPPORT (OUTPATIENT)
Dept: REHABILITATION | Facility: HOSPITAL | Age: 27
End: 2017-04-18
Attending: ORTHOPAEDIC SURGERY
Payer: MEDICAID

## 2017-04-18 DIAGNOSIS — M25.561 ACUTE PAIN OF RIGHT KNEE: Primary | ICD-10-CM

## 2017-04-18 PROCEDURE — 97110 THERAPEUTIC EXERCISES: CPT | Mod: PN

## 2017-04-18 NOTE — PROGRESS NOTES
"[id number]  Date of Treatment: 04/18/2017   Diagnosis:   Encounter Diagnosis   Name Primary?    Acute pain of right knee Yes      S/P ACL reconstruction, 3/9/2017     Status post lateral meniscus repair      Status post medial meniscus repair      Precautions: Follow protocol: Continued TTWB per MD orders  The brace will be locked in extension for ambulation and patient can flex to 90 deg only for the first several weeks  Per MD:Continue with brace locked in extension and TTWB with crutches    Time in: 2:05  Time Out: 3:08  Total Treatment Time: 55   Group Time: 25      Subjective:    Carlos reports increased pain in the knee this morning. Received good report from Dr. Kaufman, two more weeks in the crutches. States she has tried to be more compliant with her home exercises, but she is often too busy. Patient reports their pain to be 5/10 on a 0-10 scale with 0 being no pain and 10 being the worst pain imaginable.    Objective    Patient received individual therapy to increase strength, endurance, ROM, flexibility and posture with 0 patients with activities as follows:     Carlos received therapeutic exercises to develop strength, endurance, ROM, flexibility and posture for 55 minutes including:     -NMES: right quad( VMO, RF) x 10 minutes, 10/30 cycle for quad strengthening purposes/ neuromuscular education.  -quad sets 5 x 30" with strap, towel under ankle  -SLR (R) 3/10  -S/L: hip abduction/adduction 3/10    -patella mobility(4-way)    -heel slides with board/strap AAROM: x 30  -prone hip extension 3/10 (R)  -prone knee hangs x 5 minutes, 3#     -PROM: right knee extension, flexion  -seated: right knee flexion, gravity assisted x 5 minutes    Pt received cold pack x 10 minutes to right knee for swelling,muscle soreness purposes.    Written Home Exercises Provided: Continuation of current HEP, pt educated again on importance of quad strength and knee functional " ROM. Additional SLR in all 4 directions and prone hang.  Pt demo good understanding of the education provided. Carlos demonstrated good return demonstration of activities.     Assessment:   Carlos tolerated treatment session well this date. R quad remains weak with quad sets for active knee extension and continues with extensor lag, quad muscle recruitment slowly improving. Limited knee flexion as well with reports of pain and stiffness. She remains limited with progress due to decreased compliance with HEP. She continues with ambulation with RLE locked in extension per MD orders, TTWB, emphasis on heel strike. Will benefit from continued strengthening and increased compliance with HEP.     Pt will continue to benefit from skilled PT intervention. Medical Necessity is demonstrated by:  Fall Risk, Unable to participate in daily activities, Continued inability to participate in vocational pursuits, Pain limits function of effected part for some activities, Unable to participate fully in daily activities, Requires skilled supervision to complete and progress HEP, Weakness and Edema.    Patient is making good progress towards established goals.    New/Revised goals:  1. Pt will initiate HEP addressing right knee extension to 0 degrees for ambulatory purposes.  2. Pt will present with increased right quad MMT by one half grade for increased ease with functional ADLs.  3. Pt will report decreased pain to </= 5/10  4. Pt will improve right knee passive flexion to 90 degrees in pain-free range for mobility purposes.  5. Patient able to negotiate 4 steps modified independently without painful limitations. Not Met due to TTWB     Plan:  Continue with established Plan of Care towards PT goals.

## 2017-04-20 ENCOUNTER — CLINICAL SUPPORT (OUTPATIENT)
Dept: REHABILITATION | Facility: HOSPITAL | Age: 27
End: 2017-04-20
Attending: ORTHOPAEDIC SURGERY
Payer: MEDICAID

## 2017-04-20 DIAGNOSIS — M25.561 ACUTE PAIN OF RIGHT KNEE: Primary | ICD-10-CM

## 2017-04-20 PROCEDURE — 97110 THERAPEUTIC EXERCISES: CPT | Mod: PN

## 2017-04-20 NOTE — PROGRESS NOTES
"[id number]  Date of Treatment: 04/20/2017   Diagnosis:   Encounter Diagnosis   Name Primary?    Acute pain of right knee Yes      S/P ACL reconstruction, 3/9/2017     Status post lateral meniscus repair      Status post medial meniscus repair      Precautions: Follow protocol: Continued TTWB per MD orders  The brace will be locked in extension for ambulation and patient can flex to 90 deg only for the first several weeks  Per MD:Continue with brace locked in extension and TTWB with crutches    Time in: 2:02  Time Out: 3:15  Total Treatment Time: 55   Group Time: 0      Subjective:    Carlos reports decreased pain in the R knee this date. Admits she is not as compliant with her home exercises as she should be. Has been sleeping a lot at home.  Patient reports their pain to be 3/10 on a 0-10 scale with 0 being no pain and 10 being the worst pain imaginable. Pt to see Dr. Kaufman tomorrow morning.    Objective    Patient received individual therapy to increase strength, endurance, ROM, flexibility and posture with 0 patients with activities as follows:     Carlos received therapeutic exercises to develop strength, endurance, ROM, flexibility and posture for 55 minutes including:     -NMES: right quad( VMO, RF) x 10 minutes, 10/30 cycle for quad strengthening purposes/ neuromuscular education.  -quad sets 5 x 30" with strap, towel under ankle  -SLR (R) 2/10  -S/L: hip abduction/adduction 3/10 (NP)    -patella mobility(4-way)    -heel slides with board/strap AAROM: x 30  -prone hip extension 3/10 (R)  -prone knee hangs x 5 minutes, 3#     -PROM: right knee extension to +1, flexion to 97 (AROM R knee extension -2, flexion 90)  -seated: right knee flexion, gravity assisted x 5 minutes    Pt received cold pack x 10 minutes to right knee for swelling,muscle soreness purposes.    Written Home Exercises Provided: Continuation of current HEP, pt educated again on importance of " quad strength and knee functional ROM. Additional SLR in all 4 directions and prone hang.  Pt demo good understanding of the education provided. Carlos demonstrated good return demonstration of activities.     Assessment:   Carlos tolerated treatment session well this date overall. She continues with mild R quad extensor lag due to quad weakness and limited muscle recruitment. Tightness with active and passive knee flexion as well, causing limitations with ROM. She remains limited with progress due to non-compliance with HEP. She continues with ambulation with RLE locked in extension per MD orders, TTWB, emphasis on heel strike. Will benefit from continued strengthening and increased compliance with HEP.     Pt will continue to benefit from skilled PT intervention. Medical Necessity is demonstrated by:  Fall Risk, Unable to participate in daily activities, Continued inability to participate in vocational pursuits, Pain limits function of effected part for some activities, Unable to participate fully in daily activities, Requires skilled supervision to complete and progress HEP, Weakness and Edema.    Patient is making good progress towards established goals.    New/Revised goals:  1. Pt will initiate HEP addressing right knee extension to 0 degrees for ambulatory purposes.  2. Pt will present with increased right quad MMT by one half grade for increased ease with functional ADLs.  3. Pt will report decreased pain to </= 5/10  4. Pt will improve right knee passive flexion to 90 degrees in pain-free range for mobility purposes.  5. Patient able to negotiate 4 steps modified independently without painful limitations. Not Met due to TTWB     Plan:  Continue with established Plan of Care towards PT goals.

## 2017-04-21 ENCOUNTER — OFFICE VISIT (OUTPATIENT)
Dept: ORTHOPEDICS | Facility: CLINIC | Age: 27
End: 2017-04-21
Payer: MEDICAID

## 2017-04-21 VITALS
HEART RATE: 74 BPM | SYSTOLIC BLOOD PRESSURE: 116 MMHG | DIASTOLIC BLOOD PRESSURE: 64 MMHG | HEIGHT: 60 IN | WEIGHT: 167.31 LBS | BODY MASS INDEX: 32.85 KG/M2

## 2017-04-21 DIAGNOSIS — Z98.890 STATUS POST MEDIAL MENISCUS REPAIR: ICD-10-CM

## 2017-04-21 DIAGNOSIS — Z98.890 S/P ACL RECONSTRUCTION: ICD-10-CM

## 2017-04-21 DIAGNOSIS — Z98.890 STATUS POST LATERAL MENISCUS REPAIR: ICD-10-CM

## 2017-04-21 DIAGNOSIS — Z98.890 S/P RIGHT KNEE ARTHROSCOPY: Primary | ICD-10-CM

## 2017-04-21 PROCEDURE — 99999 PR PBB SHADOW E&M-EST. PATIENT-LVL II: CPT | Mod: PBBFAC,,, | Performed by: ORTHOPAEDIC SURGERY

## 2017-04-21 PROCEDURE — 99212 OFFICE O/P EST SF 10 MIN: CPT | Mod: PBBFAC,PO | Performed by: ORTHOPAEDIC SURGERY

## 2017-04-21 PROCEDURE — 99024 POSTOP FOLLOW-UP VISIT: CPT | Mod: ,,, | Performed by: ORTHOPAEDIC SURGERY

## 2017-04-21 NOTE — MR AVS SNAPSHOT
[facility]  [address] [facility] [address]  [facility] [address]  Phone: [phone]                  Carlos Coulter   2017 11:15 AM   Office Visit    Description:  Female : 1990   Provider:  Kalen Kaufman MD   Department:  [facility]           Reason for Visit     Right Knee - Pain           Diagnoses this Visit        Comments    S/P right knee arthroscopy    -  Primary            To Do List           Future Appointments        Provider Department Dept Phone    2017 11:15 AM Kalen Kaufman MD [facility] [phone]    2017 2:00 PM Libra Malhotra PTA [facility] [phone]    2017 2:00 PM Michele Trujillo, PT [facility] [phone]    2017 10:00 AM [facility] [place] [facility] [phone]    2017 10:15 AM Kalen Kaufman MD [facility] [phone]      Goals (5 Years of Data)              3/31/16    11/30/15    11/18/15    BMI is less than 25   Not on track  Not on track  Not on track      [facility]     [facility] Care [facility] -  Assistance  Unless otherwise directed by your provider, please contact [facility], our nurse care line that is available for  assistance.     Registered nurses in the [facility] provide: appointment scheduling, clinical advisement, health education, and other advisory services.  Call: [phone] (toll free)               Medications           Message regarding Medications     Verify the changes and/or additions to your medication regime listed below are the same as discussed with your clinician today.  If any of these changes or additions are incorrect, please notify your healthcare provider.             Verify that the below list of medications is an accurate representation of the medications you are currently taking.  If none reported, the list may be blank. If incorrect, please  contact your healthcare provider. Carry this list with you in case of emergency.           Current Medications     albuterol (PROAIR HFA) 90 mcg/actuation inhaler INHALE 1 PUFF EVERY 4 HOURS PRN for Wheezing    albuterol (PROVENTIL) 2.5 mg /3 mL (0.083 %) nebulizer solution Take 3 mLs (2.5 mg total) by nebulization every 6 (six) hours as needed for Wheezing.    aripiprazole (ABILIFY) 5 MG Tab Take 5 mg by mouth once daily.    clobetasol (TEMOVATE) 0.05 % cream Apply 1 application topically daily as needed.     docusate sodium (COLACE) 100 MG capsule Take 1 capsule (100 mg total) by mouth 2 (two) times daily.    FLONASE ALLERGY RELIEF 50 mcg/actuation nasal spray SHAKE WELL AND USE 2 SPRAYS IN EACH NOSTRIL EVERY DAY    inhalation device (BREATHERITE VALVED MDI SPACER) Use as directed for inhalation.    levothyroxine (SYNTHROID) 100 MCG tablet Take 1 tablet (100 mcg total) by mouth once daily.    lisdexamfetamine (VYVANSE) 40 MG Cap Take 40 mg by mouth once daily.    medroxyPROGESTERone (PROVERA) 10 MG tablet TAKE 1 TABLET(10 MG) BY MOUTH EVERY DAY    montelukast (SINGULAIR) 10 mg tablet Take 1 tablet (10 mg total) by mouth once daily.    oxycodone-acetaminophen (PERCOCET) 5-325 mg per tablet Take 1 tablet by mouth every 8 (eight) hours as needed for Pain.    promethazine (PHENERGAN) 25 MG tablet Take 1 tablet (25 mg total) by mouth every 6 (six) hours as needed for Nausea.    sucralfate (CARAFATE) 1 gram tablet Take 1 tablet (1 g total) by mouth 4 (four) times daily.    trazodone (DESYREL) 100 MG tablet Take 1 tablet by mouth every evening.    triamcinolone acetonide 0.1% (KENALOG) 0.1 % ointment AAA bid x 2 weeks then prn, avoid chronic use    pantoprazole (PROTONIX) 40 MG tablet Take 1 tablet (40 mg total) by mouth once daily. Before breakfast           Clinical Reference Information           Your Vitals Were     BP Pulse Height Weight Last Period BMI    116/64 74 5' (1.524 m) 75.9 kg (167 lb 5.3 oz) 04/03/2017  32.68 kg/m2      Blood Pressure          Most Recent Value    BP  116/64      Allergies as of 4/21/2017     Grapefruit    Pineapple    Augmentin [Amoxicillin-pot Clavulanate]    Penicillins    Tamiflu [Oseltamivir]      Immunizations Administered on Date of Encounter - 4/21/2017     None      Language Assistance Services     ATTENTION: Language assistance services are available, free of charge. Please call [phone].      ATENCIÓN: Si habla marah, tiene a shea disposición servicios gratuitos de asistencia lingüística. Llame al [phone].     CHÚ Ý: N?u b?n nói Ti?ng Vi?t, có các d?ch v? h? tr? ngôn ng? mi?n phí dành cho b?n. G?i s? [phone].         [facility] complies with applicable Federal civil rights laws and does not discriminate on the basis of race, color, national origin, age, disability, or sex.

## 2017-04-23 PROBLEM — Z98.890 S/P ACL RECONSTRUCTION: Status: ACTIVE | Noted: 2017-04-23

## 2017-04-23 PROBLEM — Z98.890 S/P RIGHT KNEE ARTHROSCOPY: Status: ACTIVE | Noted: 2017-04-23

## 2017-04-23 PROBLEM — Z98.890 STATUS POST LATERAL MENISCUS REPAIR: Status: ACTIVE | Noted: 2017-04-23

## 2017-04-23 PROBLEM — Z98.890 STATUS POST MEDIAL MENISCUS REPAIR: Status: ACTIVE | Noted: 2017-04-23

## 2017-04-23 NOTE — PROGRESS NOTES
"Subjective:          Chief Complaint: Carlos Coulter is a 26 y.o. female who had concerns including Pain of the Right Knee.    HPI Comments: Ms. Coulter is here for f/u after her right knee surgery. She is using her brace and crutches as directed and attending therapy. Pain: 0/10. She was complaining over the weekend about her "kneecap moving" however this was reassured to be okay by her therapist.    PREOPERATIVE DIAGNOSES:   1. Right knee anterior cruciate ligament tear.   2. Right knee medial meniscus tear.   3. Right knee lateral meniscus tear      POSTOPERATIVE DIAGNOSES:   1. Right knee anterior cruciate ligament tear.   2. Right knee medial meniscus tear.   3. Right knee lateral meniscus tear.      PROCEDURES:   1. Right knee anterior cruciate ligament reconstruction with ipsilateral   hamstring autograft AND gracilis allograft.   2. Right knee arthroscopic medial and lateral meniscus repair with lateral meniscus root repair  3. Right knee arthroscopic chondroplasty      SURGEON: Kalen Kaufman MD      ASSISTANTS: HALEY Mohan      COMPLICATIONS: None.       POSITION: Supine.       ANESTHESIA: General endotracheal plus right lower extremity femoral canal block.       COMPLICATIONS: None.       EBL: 20cc     EXAMINATION UNDER ANESTHESIA: Right knee, full range of motion, grade II B   positive Lachman, 2+ pivot shift. No opening to varus and mild valgus stress.       ARTHROSCOPIC FINDINGS:   1. Complete tearing, anterior cruciate ligament.   2. Medial meniscus posterior horn and body tear  3. Lateral meniscus posterior horn and meniscus root tear  4. Patellar chondromalacia Grade II  5. Medial femoral condyle chondromalacia Grade II      IMPLANTS:  RIDGID LOOP ADJUSTABLE BUTTON (STANDARD)  6 OMNI-SPAN MENISCAL REPAIR SUTURES  BIO-INTRAFIX TIBIAL SHEATH (LARGE)  BIO-INTRAFIX TAPERED SCREW (7-9X30MM)  MSKTF: Gracillis Tendon  6.5x25 mm cancellous screw  17mm spiked washer  #2 " Orthocord    Pain   Associated symptoms include joint swelling. Pertinent negatives include no chills or fever.       Review of Systems   Constitution: Negative for chills and fever.   Musculoskeletal: Positive for joint swelling and stiffness. Negative for joint pain.   All other systems reviewed and are negative.                  Objective:        General: Carlos is well-developed, well-nourished, appears stated age, in no acute distress, alert and oriented to time, place and person.     General    Vitals reviewed.  Constitutional: She is oriented to person, place, and time. She appears well-developed and well-nourished. No distress.   HENT:   Head: Normocephalic and atraumatic.   Nose: Nose normal.   Eyes: Pupils are equal, round, and reactive to light.   Cardiovascular: Normal rate.    Pulmonary/Chest: Effort normal.   Neurological: She is alert and oriented to person, place, and time.   Psychiatric: She has a normal mood and affect. Her behavior is normal. Judgment and thought content normal.     General Musculoskeletal Exam   Gait: antalgic       Right Knee Exam     Inspection   Erythema: absent  Scars: present  Swelling: absent  Effusion: present  Deformity: deformity  Bruising: absent    Tenderness   The patient is experiencing no tenderness.         Range of Motion   Extension:  0 normal   Flexion:  100 abnormal     Tests   Ligament Examination Lachman: normal (-1 to 2mm) PCL-Posterior Drawer: normal (0 to 2mm)     MCL - Valgus: normal (0 to 2mm)  LCL - Varus: normal  Patella   Patellar Apprehension: negative  Passive Patellar Tilt: neutral  Patellar Tracking: normal  Patellar Glide (quadrants): Lateral - 1   Medial - 1  Q-Angle at 90 degrees: normal    Other   Sensation: normal    Comments:  Incisions healed; no signs of infection    Muscle Strength   Right Lower Extremity   Hip Abduction: 5/5   Quadriceps:  5/5   Hamstrin/5     Vascular Exam     Right Pulses  Dorsalis Pedis:      2+  Posterior  Tibial:      2+        Edema  Right Lower Leg: absent              Assessment:       Encounter Diagnoses   Name Primary?    S/P right knee arthroscopy Yes    S/P ACL reconstruction     Status post lateral meniscus repair     Status post medial meniscus repair           Plan:         Continue with brace can wean from crutches  Continue with therapy per protocol  F/U in 4 weeks or sooner if needed

## 2017-04-25 ENCOUNTER — CLINICAL SUPPORT (OUTPATIENT)
Dept: REHABILITATION | Facility: HOSPITAL | Age: 27
End: 2017-04-25
Attending: ORTHOPAEDIC SURGERY
Payer: MEDICAID

## 2017-04-25 DIAGNOSIS — M25.561 ACUTE PAIN OF RIGHT KNEE: Primary | ICD-10-CM

## 2017-04-25 PROCEDURE — 97110 THERAPEUTIC EXERCISES: CPT | Mod: PN | Performed by: PHYSICAL THERAPIST

## 2017-04-25 NOTE — PLAN OF CARE
"TIME RECORD    Date: 04/25/2017    Start Time:  1300  Stop Time:  1400    PROCEDURES:    TIMED  Procedure Time Min.   TE Start:1300  Stop:1330 30    Start:  Stop:     Start:  Stop:     Start:  Stop:          UNTIMED  Procedure Time Min.    Start:  Stop:     Start:  Stop:      Total Timed Minutes:  30  Total Timed Units:  2  Total Untimed Units:  0  Charges Billed/# of units:  2    PHYSICAL THERAPY UPDATED PLAN OF TREATMENT    Patient name: Carlos Coulter  Onset Date: 03/09/2017   SOC Date:  03/10/2017  Primary Diagnosis:    1. Acute pain of right knee        S/P ACL reconstruction, 3/9/2017      Status post lateral meniscus repair       Status post medial meniscus repair      Treatment Diagnosis:  Impaired functional mobility with right knee pain, gait abnormality  Precautions:    Continue with brace, can wean from crutches  Continue with therapy per protocol  Visits from SOC:  13    Functional Level Prior to SOC:    1. Right knee anterior cruciate ligament reconstruction with ipsilateral   hamstring autograft AND gracilis allograft.   2. Right knee arthroscopic medial and lateral meniscus repair with lateral meniscus root repair  3. Right knee arthroscopic chondroplasty    Updated Assessment:    - ambulatory tasks:cone walking: x 8 with emphasis on quad contraction (R)  -NMES: right quad( VMO, RF) x 10 minutes, 10/30 cycle for quad strengthening purposes/ neuromuscular education.  -quad sets 5 x 30" with strap, towel under ankle  -SLR (R) 3/10  -S/L: hip abduction/adduction 3/10   -patella mobility(4-way)  -heel slides with board/strap AAROM: x 30  -prone hip extension 3/10 (R)  -prone knee hangs x 10 minutes, 3#      -PROM:right knee  Flexion: 105 degrees  Extension: 0 degrees after prone knee hangs, passive extension stretch    Gait: Patient ambulated with one crutch modified independently with brace unlocked 100 ft.  Gait deviations: decreased abdi, decreased step length to RLE, decreased " hip/knee flexion with swing phase of gait., minimal extension lag noted.     Previous Short Term Goals Status:   Short Term Goals: 6 weeks  1. Pt will initiate HEP addressing right knee extension to 0 degrees for ambulatory purposes.. Ongoing  2. Pt will present with increased right quad MMT by one half grade for increased ease with functional ADLs. Met  3. Pt will report decreased pain to </= 5/10 Met  4. Pt will improve right knee passive flexion to 90 degrees in pain-free range for mobility purposes. Met  5. Patient able to negotiate 4 steps modified independently without painful limitations.Ongoing    New Short Term Goals Status:     Patient will increase hip abductor strength 1/2 grade for ambulatory purposes.  Patient will ambulate independently 150 ft without extension lag for ADL purposes.  Patient will demonstrate right knee flexion passively to 120 degrees for mobility purposes.    Long Term Goal Status:   continue per initial plan of care.  1. Patient will return to community ambulation independently with right knee active motion: 0-120+ degrees with < 3/10 pain.  2. Patient will demonstrate right lower extremity strength 4+ to 5/5 for standing/mobility purposes.  3. Patient will demonstrate right single leg squat from 0-60 degrees for stability/dynamis purposes.  4. Pt will be independent with HEP and self management of symptoms    Reasons for Recertification of Therapy:    Patient S/P: 3/9/2017  1. Right knee anterior cruciate ligament reconstruction with ipsilateral   hamstring autograft AND gracilis allograft.   2. Right knee arthroscopic medial and lateral meniscus repair with lateral meniscus root repair  3. Right knee arthroscopic chondroplasty    Patient starting to progress right knee/quad strength. Gait abnormality noted with decreased hip/core strength. Decreased neuromuscular control in standing.   Gait: modified independent with one crutch at this time with brace on. Patient slowly weaning  away from crutches.  Continues to work on mobility impairments.  Patient may benefit from therapy to address impairments stated above for ADL purposes.  Certification Period: 03/10/2017 to 08/24/2017 continues...  Recommended Treatment Plan: 2-3 times per week for 24 weeks: Electrical Stimulation NMES/TENS, Gait Training, Group Therapy, Manual Therapy, Moist Heat/ Ice, Neuromuscular Re-ed, Patient Education, Self Care, Therapeutic Activites and Therapeutic Exercise  Other Recommendations: Thank you or consult.      Therapist's Name: Michele Trujillo, PT  Date: 04/25/2017    I CERTIFY THE NEED FOR THESE SERVICES FURNISHED UNDER THIS PLAN OF TREATMENT AND WHILE UNDER MY CARE    Physician's comments: ________________________________________________________________________________________________________________________________________________      Physician's Name: ___________________________________

## 2017-04-26 ENCOUNTER — TELEPHONE (OUTPATIENT)
Dept: OTOLARYNGOLOGY | Facility: CLINIC | Age: 27
End: 2017-04-26

## 2017-04-26 NOTE — TELEPHONE ENCOUNTER
Spoke with pt and explained that Earlene is the only ENT and I will put her on a wait list for a sooner appt. Pt verbalized understanding.

## 2017-04-26 NOTE — TELEPHONE ENCOUNTER
----- Message from Almita Martinez sent at 4/26/2017  8:51 AM CDT -----  Contact: Patient  Patient states that she needs her ears cleaned out and the next available appointment is not until 05/16/2017.  Patient would like to be seen today.  Please call [phone].  Thank you

## 2017-04-27 ENCOUNTER — CLINICAL SUPPORT (OUTPATIENT)
Dept: REHABILITATION | Facility: HOSPITAL | Age: 27
End: 2017-04-27
Attending: ORTHOPAEDIC SURGERY
Payer: MEDICAID

## 2017-04-27 DIAGNOSIS — M25.561 ACUTE PAIN OF RIGHT KNEE: Primary | ICD-10-CM

## 2017-04-27 PROCEDURE — 97110 THERAPEUTIC EXERCISES: CPT | Mod: PN | Performed by: PHYSICAL THERAPIST

## 2017-04-27 NOTE — PROGRESS NOTES
"[id number]  Date of Treatment: 04/27/2017   Diagnosis:   Encounter Diagnosis   Name Primary?    Acute pain of right knee Yes       Time in: 1400  Time Out: 1500  Total Treatment Time: 55  Group Time: 0      Subjective:    Carlos reports improvement of symptoms. Has performed HEP once since last session. Patient admitted not being compliant with HEP.  Patient reports their pain to be 1/10 on a 0-10 scale with 0 being no pain and 10 being the worst pain imaginable.    Objective    Patient received individual therapy to increase strength, endurance, ROM, flexibility, posture and core stabilization with 0 patients with activities as follows:     Carlos received therapeutic exercises to develop strength, endurance, ROM, flexibility, posture and core stabilization for 55 minutes including:   -bike: half revolution x 10 minutes    Ambulatory tasks:cone walking: x 8 with emphasis on quad contraction (R)  NDT: on step sequence, proprioception purposes 5 minutes    -NMES: right quad( VMO, RF) x 10 minutes, 10/30 cycle for quad strengthening purposes/ neuromuscular education.(previous)    -quad sets 5 x 30" with strap, towel under ankle  -SLR (R) 3/10  -S/L: hip abduction/adduction 3/10   -patella mobility(4-way)  -heel slides with board/strap AAROM: x 30  -prone hip extension 3/10 (R)    -PROM: right knee flexion/extension, patella mobility    -Prone knee hangs x 10 minutes, 3# (pillow under knee)      Gait: Patient ambulated with one crutch modified independently with brace unlocked 100 ft.(continues)  Gait deviations: decreased abdi, decreased step length to RLE, decreased hip/knee flexion with swing phase of gait., minimal extension lag noted.    Written Home Exercises Provided: Continuation  Pt demo good understanding of the education provided. Carlos demonstrated good return demonstration of activities.     Assessment:   -gait abnormality  -quad weakness  -right hip " flexor muscle shortening  -extension lag (minimal)    Pt will continue to benefit from skilled PT intervention. Medical Necessity is demonstrated by:  Unable to participate in daily activities, Continued inability to participate in vocational pursuits, Unable to participate fully in daily activities, Requires skilled supervision to complete and progress HEP and Weakness.    Patient is making good progress towards established goals.    Short Term Goals: 6 weeks  1. Pt will initiate HEP addressing right knee extension to 0 degrees for ambulatory purposes.. Ongoing  2. Pt will present with increased right quad MMT by one half grade for increased ease with functional ADLs. Met  3. Pt will report decreased pain to </= 5/10 Met  4. Pt will improve right knee passive flexion to 90 degrees in pain-free range for mobility purposes. Met  5. Patient able to negotiate 4 steps modified independently without painful limitations.Ongoing     New Short Term Goals Status:    Patient will increase hip abductor strength 1/2 grade for ambulatory purposes.  Patient will ambulate independently 150 ft without extension lag for ADL purposes.  Patient will demonstrate right knee flexion passively to 120 degrees for mobility purposes      Plan:  Continue with established Plan of Care towards PT goals.

## 2017-05-02 ENCOUNTER — CLINICAL SUPPORT (OUTPATIENT)
Dept: REHABILITATION | Facility: HOSPITAL | Age: 27
End: 2017-05-02
Attending: ORTHOPAEDIC SURGERY
Payer: MEDICAID

## 2017-05-02 DIAGNOSIS — M25.561 ACUTE PAIN OF RIGHT KNEE: Primary | ICD-10-CM

## 2017-05-02 PROCEDURE — 97110 THERAPEUTIC EXERCISES: CPT | Mod: PN

## 2017-05-02 NOTE — PROGRESS NOTES
"[id number]  Date of Treatment: 05/02/2017   Diagnosis:   Encounter Diagnosis   Name Primary?    Acute pain of right knee Yes       Time in: 1:54  Time Out: 3:08  Total Treatment Time: 55  Group Time: 25      Subjective:    Carlos reports improvement of symptoms. Admits non-compliance with HEP. Patient reports their pain to be 0/10 on a 0-10 scale with 0 being no pain and 10 being the worst pain imaginable. States she has not been using the crutches since last week.     Objective    Patient received individual therapy to increase strength, endurance, ROM, flexibility, posture and core stabilization with 0 patients with activities as follows:     Carlos received therapeutic exercises to develop strength, endurance, ROM, flexibility, posture and core stabilization for 55 minutes including:   -bike: half revolution x 10 minutes    Ambulatory tasks:cone/eboni walking: x 8 with emphasis on quad contraction (R) x 5 min  NDT: on step sequence, proprioception purposes 5 minutes    -NMES: right quad( VMO, RF) x 10 minutes, 10/30 cycle for quad strengthening purposes/ neuromuscular education.  -patella mobilizations (4-way) and MFR to distal quad for decreased tightness and improved quad recruitment x 10 min  -Prone knee hangs x 10 minutes, 3# (towel under knee)  -quad sets 5 x 30" with strap, towel under ankle    -PROM: right knee flexion/extension, patella mobility    ~Not performed~  -SLR (R) 3/10  -S/L: hip abduction/adduction 3/10   -heel slides with board/strap AAROM: x 30  -prone hip extension 3/10 (R)      Gait: Patient ambulated without crutch and with brace unlocked 100 ft.(continues)  Gait deviations: decreased abdi, decreased step length to RLE, decreased R hip/knee flexion with swing phase of gait., mild-mod extension lag noted in R LE.    Written Home Exercises Provided: Continuation of current HEP, pt educated again on importance of quad sets, prone hangs, and " maintaining full knee extension.  Pt demo good understanding of the education provided. Carlos demonstrated good return demonstration of activities.     Assessment:   Pt with overall good tolerance to treatment this date. She arrived with increased extensor lag of R LE, mild decrease of quad mm recruitment, and decreased patellar mobility. All improved toward end of session yet pt ambulated out of clinic with returned extensor lag. She required moderate verbal and tactile cues during gait activities for quad set during stance phase of gait as well as hip/knee flexion to prevent circumduction during swing through. She continues with quad weakness and decreased compliance with HEP.     Pt will continue to benefit from skilled PT intervention. Medical Necessity is demonstrated by:  Unable to participate in daily activities, Continued inability to participate in vocational pursuits, Unable to participate fully in daily activities, Requires skilled supervision to complete and progress HEP and Weakness.    Patient is making good progress towards established goals.    Short Term Goals: 6 weeks  1. Pt will initiate HEP addressing right knee extension to 0 degrees for ambulatory purposes.. Ongoing  2. Pt will present with increased right quad MMT by one half grade for increased ease with functional ADLs. Met  3. Pt will report decreased pain to </= 5/10 Met  4. Pt will improve right knee passive flexion to 90 degrees in pain-free range for mobility purposes. Met  5. Patient able to negotiate 4 steps modified independently without painful limitations.Ongoing     New Short Term Goals Status:    Patient will increase hip abductor strength 1/2 grade for ambulatory purposes.  Patient will ambulate independently 150 ft without extension lag for ADL purposes.  Patient will demonstrate right knee flexion passively to 120 degrees for mobility purposes      Plan:  Continue with established Plan of Care towards PT goals.

## 2017-05-04 ENCOUNTER — CLINICAL SUPPORT (OUTPATIENT)
Dept: REHABILITATION | Facility: HOSPITAL | Age: 27
End: 2017-05-04
Attending: ORTHOPAEDIC SURGERY
Payer: MEDICAID

## 2017-05-04 DIAGNOSIS — M25.561 ACUTE PAIN OF RIGHT KNEE: Primary | ICD-10-CM

## 2017-05-04 PROCEDURE — 97110 THERAPEUTIC EXERCISES: CPT | Mod: PN

## 2017-05-04 NOTE — PROGRESS NOTES
"Name: Carlos Coulter  [facility] Number: [id number]  Date of Treatment: 05/04/2017   Diagnosis:   Encounter Diagnosis   Name Primary?    Acute pain of right knee Yes       Time in: 3:00  Time Out: 3:08  Total Treatment Time: 55  Group Time: 0      Subjective:    Carlos reports stiffness in the R knee this date. Continued lack of compliance with HEP but is "trying". Patient reports their pain to be 1/10 on a 0-10 scale with 0 being no pain and 10 being the worst pain imaginable.     Objective    Patient received individual therapy to increase strength, endurance, ROM, flexibility, posture and core stabilization with 0 patients with activities as follows:     Carlos received therapeutic exercises to develop strength, endurance, ROM, flexibility, posture and core stabilization for 55 minutes including:   -bike: half revolution x 10 minutes (able to make full revolutions for lat 3 minutes)    Ambulatory tasks:cone/eboni walking: x 8 with emphasis on quad contraction (R) x 5 min  NDT: on step sequence, proprioception purposes 5 minutes    -NMES: right quad( VMO, RF) x 10 minutes, 10/30 cycle for quad strengthening purposes/ neuromuscular education.  -patella mobilizations (4-way) and MFR to distal quad for decreased tightness and improved quad recruitment x 10 min  -Prone knee hangs x 10 minutes, 3# (towel under knee)  -quad sets 5 x 30" with strap, towel under ankle  -SLR (R) 3/10  -heel slides with board/strap AAROM: x 30  -PROM: right knee flexion/extension, patella mobility    R KNEE AROM Upon arrival Post tx   extension -4 0   flexion 103 108     ~Not performed~  -S/L: hip abduction/adduction 3/10   -prone hip extension 3/10 (R)      Gait: Patient ambulated without crutch and with brace unlocked 100 ft.(continues)  Gait deviations: decreased abdi, decreased step length to RLE, decreased R hip/knee flexion with swing phase of gait., mild-mod extension lag noted in R LE.    Written Home Exercises Provided: " Continuation of current HEP, pt educated again on importance of quad sets, prone hangs, and maintaining full knee extension.  Pt demo good understanding of the education provided. Carlos demonstrated good return demonstration of activities.     Assessment:   Pt with overall improvement of knee ROM this date however she continues with extensor lag during ambulation with and without brace. Guarding of rectus femoris against full knee extension with quad set due to report of pain. Verbal cues for decreased step length and increased quad set during gait training. Patellar mobility improved slightly as well as quad muscle recruitment. Pt reports good understanding of importance for improved knee ROM however remains non-compliant with HEP. Will benefit from continued strengthening and stretching.     Pt will continue to benefit from skilled PT intervention. Medical Necessity is demonstrated by:  Unable to participate in daily activities, Continued inability to participate in vocational pursuits, Unable to participate fully in daily activities, Requires skilled supervision to complete and progress HEP and Weakness.    Patient is making good progress towards established goals.    Short Term Goals: 6 weeks  1. Pt will initiate HEP addressing right knee extension to 0 degrees for ambulatory purposes.. Ongoing  2. Pt will present with increased right quad MMT by one half grade for increased ease with functional ADLs. Met  3. Pt will report decreased pain to </= 5/10 Met  4. Pt will improve right knee passive flexion to 90 degrees in pain-free range for mobility purposes. Met  5. Patient able to negotiate 4 steps modified independently without painful limitations.Ongoing     New Short Term Goals Status:    Patient will increase hip abductor strength 1/2 grade for ambulatory purposes.  Patient will ambulate independently 150 ft without extension lag for ADL purposes.  Patient will demonstrate right knee flexion passively to 120  degrees for mobility purposes      Plan:  Continue with established Plan of Care towards PT goals.

## 2017-05-07 ENCOUNTER — NURSE TRIAGE (OUTPATIENT)
Dept: ADMINISTRATIVE | Facility: CLINIC | Age: 27
End: 2017-05-07

## 2017-05-07 NOTE — TELEPHONE ENCOUNTER
Reason for Disposition   MODERATE rectal bleeding (small blood clots, passing blood without stool, or toilet water turns red)    Protocols used: ST RECTAL BLEEDING-A-    Pt calling with concerns of bloody stools for 4 days.  Care Advice given.  Appointment scheduled for tomorrow with MD.

## 2017-05-08 ENCOUNTER — LAB VISIT (OUTPATIENT)
Dept: LAB | Facility: HOSPITAL | Age: 27
End: 2017-05-08
Attending: FAMILY MEDICINE
Payer: MEDICAID

## 2017-05-08 ENCOUNTER — OFFICE VISIT (OUTPATIENT)
Dept: FAMILY MEDICINE | Facility: CLINIC | Age: 27
End: 2017-05-08
Payer: MEDICAID

## 2017-05-08 VITALS
HEART RATE: 61 BPM | DIASTOLIC BLOOD PRESSURE: 82 MMHG | RESPIRATION RATE: 16 BRPM | HEIGHT: 60 IN | BODY MASS INDEX: 33.29 KG/M2 | TEMPERATURE: 98 F | WEIGHT: 169.56 LBS | SYSTOLIC BLOOD PRESSURE: 110 MMHG

## 2017-05-08 DIAGNOSIS — K62.5 RECTAL BLEEDING: ICD-10-CM

## 2017-05-08 DIAGNOSIS — K59.00 CONSTIPATION, UNSPECIFIED CONSTIPATION TYPE: Primary | ICD-10-CM

## 2017-05-08 DIAGNOSIS — K60.2 FISSURE, ANAL: ICD-10-CM

## 2017-05-08 LAB
BASOPHILS # BLD AUTO: 0.02 K/UL
BASOPHILS NFR BLD: 0.4 %
DIFFERENTIAL METHOD: NORMAL
EOSINOPHIL # BLD AUTO: 0.1 K/UL
EOSINOPHIL NFR BLD: 1.6 %
ERYTHROCYTE [DISTWIDTH] IN BLOOD BY AUTOMATED COUNT: 13.5 %
HCT VFR BLD AUTO: 40 %
HGB BLD-MCNC: 13.3 G/DL
LYMPHOCYTES # BLD AUTO: 2 K/UL
LYMPHOCYTES NFR BLD: 38.8 %
MCH RBC QN AUTO: 28.5 PG
MCHC RBC AUTO-ENTMCNC: 33.3 %
MCV RBC AUTO: 86 FL
MONOCYTES # BLD AUTO: 0.3 K/UL
MONOCYTES NFR BLD: 6.8 %
NEUTROPHILS # BLD AUTO: 2.6 K/UL
NEUTROPHILS NFR BLD: 52.2 %
PLATELET # BLD AUTO: 235 K/UL
PMV BLD AUTO: 10.6 FL
RBC # BLD AUTO: 4.67 M/UL
WBC # BLD AUTO: 5.03 K/UL

## 2017-05-08 PROCEDURE — 85025 COMPLETE CBC W/AUTO DIFF WBC: CPT

## 2017-05-08 PROCEDURE — 36415 COLL VENOUS BLD VENIPUNCTURE: CPT | Mod: PO

## 2017-05-08 PROCEDURE — 99213 OFFICE O/P EST LOW 20 MIN: CPT | Mod: S$GLB,,, | Performed by: FAMILY MEDICINE

## 2017-05-08 RX ORDER — TRAZODONE HYDROCHLORIDE 100 MG/1
100 TABLET ORAL NIGHTLY PRN
COMMUNITY

## 2017-05-08 NOTE — MR AVS SNAPSHOT
[facility]  [address] [address] [address]  [facility] [address]  Phone: [phone]  Fax: [phone]                  Carlos Coulter   2017 8:30 AM   Office Visit    Description:  Female : 1990   Provider:  Fanta Schuster MD   Department:  [facility]           Reason for Visit     Rectal Bleeding           Diagnoses this Visit        Comments    Constipation, unspecified constipation type    -  Primary     Rectal bleeding         Fissure, anal                To Do List           Future Appointments        Provider Department Dept Phone    2017 11:10 AM LAB, [place] [facility] [phone]    2017 2:00 PM Libra Malhotra PTA [facility] [phone]    2017 2:00 PM Michele Trujillo, PT [facility] [phone]    2017 2:00 PM Libra Malhotra PTA [facility] [phone]    2017 2:00 PM Libra Malhotra PTA [facility] [phone]      Goals (5 Years of Data)              3/31/16    11/30/15    11/18/15    BMI is less than 25   Not on track  Not on track  Not on track      [facility]     [facility] Nurse Care Line - 24/7 Assistance  Unless otherwise directed by your provider, please contact [facility], our nurse care line that is available for 24/7 assistance.     Registered nurses in the [facility] provide: appointment scheduling, clinical advisement, health education, and other advisory services.  Call: [phone] (toll free)               Medications           Message regarding Medications     Verify the changes and/or additions to your medication regime listed below are the same as discussed with your clinician today.  If any of these changes or additions are incorrect, please notify your healthcare provider.        STOP taking these medications     medroxyPROGESTERone (PROVERA) 10 MG tablet TAKE 1  TABLET(10 MG) BY MOUTH EVERY DAY    sucralfate (CARAFATE) 1 gram tablet Take 1 tablet (1 g total) by mouth 4 (four) times daily.    pantoprazole (PROTONIX) 40 MG tablet Take 1 tablet (40 mg total) by mouth once daily. Before breakfast           Verify that the below list of medications is an accurate representation of the medications you are currently taking.  If none reported, the list may be blank. If incorrect, please contact your healthcare provider. Carry this list with you in case of emergency.           Current Medications     albuterol (PROAIR HFA) 90 mcg/actuation inhaler INHALE 1 PUFF EVERY 4 HOURS PRN for Wheezing    albuterol (PROVENTIL) 2.5 mg /3 mL (0.083 %) nebulizer solution Take 3 mLs (2.5 mg total) by nebulization every 6 (six) hours as needed for Wheezing.    aripiprazole (ABILIFY) 5 MG Tab Take 5 mg by mouth once daily.    clobetasol (TEMOVATE) 0.05 % cream Apply 1 application topically daily as needed.     docusate sodium (COLACE) 100 MG capsule Take 1 capsule (100 mg total) by mouth 2 (two) times daily.    FLONASE ALLERGY RELIEF 50 mcg/actuation nasal spray SHAKE WELL AND USE 2 SPRAYS IN EACH NOSTRIL EVERY DAY    inhalation device (BREATHERITE VALVED MDI SPACER) Use as directed for inhalation.    levothyroxine (SYNTHROID) 100 MCG tablet Take 1 tablet (100 mcg total) by mouth once daily.    lisdexamfetamine (VYVANSE) 40 MG Cap Take 40 mg by mouth once daily.    montelukast (SINGULAIR) 10 mg tablet Take 1 tablet (10 mg total) by mouth once daily.    oxycodone-acetaminophen (PERCOCET) 5-325 mg per tablet Take 1 tablet by mouth every 8 (eight) hours as needed for Pain.    promethazine (PHENERGAN) 25 MG tablet Take 1 tablet (25 mg total) by mouth every 6 (six) hours as needed for Nausea.    trazodone (DESYREL) 100 MG tablet Take 100 mg by mouth nightly as needed for Insomnia.    triamcinolone acetonide 0.1% (KENALOG) 0.1 % ointment AAA bid x 2 weeks then prn, avoid chronic use           Clinical  Reference Information           Your Vitals Were     BP Pulse Temp Resp Height Weight    110/82 61 97.7 °F (36.5 °C) (Oral) 16 5' (1.524 m) 76.9 kg (169 lb 8.5 oz)    Last Period BMI             04/03/2017 33.11 kg/m2         Blood Pressure          Most Recent Value    BP  110/82      Allergies as of 5/8/2017     Grapefruit    Pineapple    Augmentin [Amoxicillin-pot Clavulanate]    Penicillins    Tamiflu [Oseltamivir]      Immunizations Administered on Date of Encounter - 5/8/2017     None      Orders Placed During Today's Visit     Future Labs/Procedures Expected by Expires    CBC auto differential  5/8/2017 7/7/2018      Language Assistance Services     ATTENTION: Language assistance services are available, free of charge. Please call [phone].      ATENCIÓN: Si habla marah, tiene a shea disposición servicios gratuitos de asistencia lingüística. Llame al [phone].     CHÚ Ý: N?u b?n nói Ti?ng Vi?t, có các d?ch v? h? tr? ngôn ng? mi?n phí dành cho b?n. G?i s? [phone].         [facility] complies with applicable Federal civil rights laws and does not discriminate on the basis of race, color, national origin, age, disability, or sex.

## 2017-05-09 ENCOUNTER — TELEPHONE (OUTPATIENT)
Dept: FAMILY MEDICINE | Facility: CLINIC | Age: 27
End: 2017-05-09

## 2017-05-09 ENCOUNTER — CLINICAL SUPPORT (OUTPATIENT)
Dept: REHABILITATION | Facility: HOSPITAL | Age: 27
End: 2017-05-09
Attending: ORTHOPAEDIC SURGERY
Payer: MEDICAID

## 2017-05-09 DIAGNOSIS — M25.561 ACUTE PAIN OF RIGHT KNEE: Primary | ICD-10-CM

## 2017-05-09 DIAGNOSIS — K62.5 RECTAL BLEED: Primary | ICD-10-CM

## 2017-05-09 PROCEDURE — 97110 THERAPEUTIC EXERCISES: CPT | Mod: PN

## 2017-05-09 NOTE — TELEPHONE ENCOUNTER
----- Message from Vasilechino Michelle sent at 5/9/2017 10:43 AM CDT -----  Contact: Patient  Patient states that she would like to speak to Dr Schuster or the nurse about what happened to her yesterday and wouldn't give any other information.  Can you please call [phone].  Thank you

## 2017-05-09 NOTE — PROGRESS NOTES
"Name: Carlos Coulter  [facility] Number: [id number]  Date of Treatment: 05/09/2017   Diagnosis:   Encounter Diagnosis   Name Primary?    Acute pain of right knee Yes       Time in: 1:50  Time Out: 3:08  Total Treatment Time: 55  Group Time: 0      Subjective:    Carlos reports improved compliance to HEP over last couple of days, especially for extension. States the brace is somewhat difficult to wear. Patient reports their pain to be 1/10 on a 0-10 scale with 0 being no pain and 10 being the worst pain imaginable.     Objective    Patient received individual therapy to increase strength, endurance, ROM, flexibility, posture and core stabilization with 0 patients with activities as follows:     Carlos received therapeutic exercises to develop strength, endurance, ROM, flexibility, posture and core stabilization for 55 minutes including:     -bike: half revolution x 10 minutes (able to make full revolutions for last 5 minutes)    Ambulatory tasks:cone/eboni walking: x 8 with emphasis on quad contraction (R) x 5 min  NDT: on step sequence, proprioception purposes 5 minutes    -NMES: right quad( VMO, RF) x 10 minutes, 10/30 cycle for quad strengthening purposes/ neuromuscular education.  -patella mobilizations (4-way) and MFR to distal quad for decreased tightness and improved quad recruitment x 10 min  -Prone knee hangs x 10 minutes, 3# (towel under knee)  -quad sets 20 x 5" with strap, towel under ankle  -SLR (R) 3/10  -heel slides with board/strap AAROM: x 30  -PROM: right knee flexion/extension, patella mobility    R KNEE AROM Upon arrival Post tx   extension -2 0 (+1 passive)   flexion 105 112     ~Not performed~  -S/L: hip abduction/adduction 3/10   -prone hip extension 3/10 (R)      Gait: Patient ambulated without crutch and with brace unlocked 100 ft.(continues)  Gait deviations: decreased abdi, decreased step length to RLE, decreased R hip/knee flexion with swing phase of gait., mild-mod extension " lag noted in R LE.    Written Home Exercises Provided: Continuation of current HEP, pt educated again on importance of quad sets, prone hangs, and maintaining full knee extension.  Pt demo good understanding of the education provided. Lavidia demonstrated good return demonstration of activities.     Assessment:   Pt with improved tolerance to treatment session this date. Greater AROM of R knee in flexion and extension however continues with quad weakness and extensor lag, especially with ambulation. Mild guarding with rectus femoris and hamstring against full knee extension with quad set due to report of pain/tightness. Slow improvement of patellar mobility with knee extension. Pt with good understanding of importance for continued compliance with HEP to improve knee ROM and quad strength. Pt will benefit from continued progression of strengthening and stretching.     Pt will continue to benefit from skilled PT intervention. Medical Necessity is demonstrated by:  Unable to participate in daily activities, Continued inability to participate in vocational pursuits, Unable to participate fully in daily activities, Requires skilled supervision to complete and progress HEP and Weakness.    Patient is making good progress towards established goals.    Short Term Goals: 6 weeks  1. Pt will initiate HEP addressing right knee extension to 0 degrees for ambulatory purposes.. Ongoing  2. Pt will present with increased right quad MMT by one half grade for increased ease with functional ADLs. Met  3. Pt will report decreased pain to </= 5/10 Met  4. Pt will improve right knee passive flexion to 90 degrees in pain-free range for mobility purposes. Met  5. Patient able to negotiate 4 steps modified independently without painful limitations.Ongoing     New Short Term Goals Status:    Patient will increase hip abductor strength 1/2 grade for ambulatory purposes.  Patient will ambulate independently 150 ft without extension lag for  ADL purposes.  Patient will demonstrate right knee flexion passively to 120 degrees for mobility purposes      Plan:  Continue with established Plan of Care towards PT goals.

## 2017-05-09 NOTE — TELEPHONE ENCOUNTER
Spoke with pt  Still with rectal bleeding  Discussed normal CBC results  Will ref to surgeon  Pt will call if sx persistent or worsen

## 2017-05-09 NOTE — TELEPHONE ENCOUNTER
Pt saw blood in her underwear this am. Pt took warm bath this am. Pt says her rectum hurts now. Pt did have bowel movement this am. Please advise.--lp

## 2017-05-10 ENCOUNTER — OFFICE VISIT (OUTPATIENT)
Dept: SURGERY | Facility: CLINIC | Age: 27
End: 2017-05-10
Payer: MEDICAID

## 2017-05-10 VITALS
BODY MASS INDEX: 32.76 KG/M2 | TEMPERATURE: 97 F | HEIGHT: 60 IN | SYSTOLIC BLOOD PRESSURE: 109 MMHG | HEART RATE: 57 BPM | WEIGHT: 166.88 LBS | DIASTOLIC BLOOD PRESSURE: 71 MMHG

## 2017-05-10 DIAGNOSIS — K60.2 FISSURE IN ANO: Primary | ICD-10-CM

## 2017-05-10 PROCEDURE — 99203 OFFICE O/P NEW LOW 30 MIN: CPT | Mod: S$PBB,,, | Performed by: SURGERY

## 2017-05-10 PROCEDURE — 99999 PR PBB SHADOW E&M-EST. PATIENT-LVL III: CPT | Mod: PBBFAC,,, | Performed by: SURGERY

## 2017-05-10 PROCEDURE — 99213 OFFICE O/P EST LOW 20 MIN: CPT | Mod: PBBFAC,PO | Performed by: SURGERY

## 2017-05-10 NOTE — PROGRESS NOTES
Subjective:       Patient ID: Carlos Coulter is a 26 y.o. female.    Chief Complaint: Consult (Rectal Bleed)    HPI  Pt referred to me for evaluation of rectal bleeding.  Pt notes that bleeding is only with wiping. N otes pain with BM.  No fever/chills.  Denies changes in bowel habits.  No abdomainl pain.    Review of Systems   Constitutional: Negative for activity change, appetite change, fever and unexpected weight change.   Respiratory: Negative for chest tightness, shortness of breath and wheezing.    Cardiovascular: Negative for chest pain.   Gastrointestinal: Positive for anal bleeding, blood in stool and rectal pain. Negative for abdominal distention, abdominal pain, constipation, diarrhea, nausea and vomiting.   Genitourinary: Negative for difficulty urinating, dysuria and frequency.   Skin: Negative for color change and wound.   Neurological: Negative for dizziness.   Hematological: Negative for adenopathy. Does not bruise/bleed easily.   Psychiatric/Behavioral: Negative for agitation and decreased concentration.       Objective:      Physical Exam   Constitutional: She is oriented to person, place, and time. She appears well-developed and well-nourished.   HENT:   Head: Normocephalic and atraumatic.   Eyes: Pupils are equal, round, and reactive to light. Right eye exhibits no discharge. Left eye exhibits no discharge. No scleral icterus.   Neck: Normal range of motion. Neck supple. No tracheal deviation present. No thyromegaly present.   Cardiovascular: Normal rate, regular rhythm and normal heart sounds.    No murmur heard.  Pulmonary/Chest: Effort normal and breath sounds normal. She exhibits no tenderness.   Abdominal: Soft. Bowel sounds are normal. She exhibits no distension, no abdominal bruit, no pulsatile midline mass and no mass. There is no hepatosplenomegaly. There is no tenderness. There is no rigidity, no rebound, no guarding, no tenderness at McBurney's point and negative Venegas's  sign. No hernia. Hernia confirmed negative in the ventral area.   Genitourinary: Rectum normal.   Genitourinary Comments: POst midline fissure in ano   Musculoskeletal: Normal range of motion.   Lymphadenopathy:     She has no cervical adenopathy.   Neurological: She is alert and oriented to person, place, and time.   Skin: Skin is warm. No rash noted. No erythema.   Psychiatric: She has a normal mood and affect. Her behavior is normal.   Vitals reviewed.      Assessment:     POst midline fissure in ano  No diagnosis found.    Plan:       Lengthy d/w pt regarding fissures, and there natural progression.  First recommend conservative mgmt of the fissure.  I have recommended increasing fiber in diet, fiber supplement, and stool softener.  In addition will begin topical diltiazem.  IF no improvement over the next 6 weeks would consider surgical sphincterotomy.

## 2017-05-10 NOTE — MR AVS SNAPSHOT
[facility]  [address] [facility] [address]  [facility] [address]  Phone: [phone]                  Carlos Coulter   5/10/2017 1:15 PM   Office Visit    Description:  Female : 1990   Provider:  Wilbur Nieto MD   Department:  [facility]           Reason for Visit     Consult           Diagnoses this Visit        Comments    Fissure in ano    -  Primary            To Do List           Future Appointments        Provider Department Dept Phone    2017 2:00 PM Michele Trujillo, PT [facility] [phone]    2017 2:00 PM Libra Malhotra, PTA [facility] [phone]    2017 2:00 PM Libra Malhotra PTA [facility] [phone]    2017 10:00 AM [facility] [place] [facility] [phone]    2017 10:15 AM Kalen Kaufman MD [facility] [phone]      Goals (5 Years of Data)              3/31/16    11/30/15    11/18/15    BMI is less than 25   Not on track  Not on track  Not on track      [facility]     [facility] Nurse Care Line -  Assistance  Unless otherwise directed by your provider, please contact [facility], our nurse care line that is available for  assistance.     Registered nurses in the [facility] provide: appointment scheduling, clinical advisement, health education, and other advisory services.  Call: [phone] (toll free)               Medications           Message regarding Medications     Verify the changes and/or additions to your medication regime listed below are the same as discussed with your clinician today.  If any of these changes or additions are incorrect, please notify your healthcare provider.             Verify that the below list of medications is an accurate representation of the medications you are currently taking.  If none reported, the list may be blank. If incorrect, please contact your  healthcare provider. Carry this list with you in case of emergency.           Current Medications     aripiprazole (ABILIFY) 5 MG Tab Take 5 mg by mouth once daily.    clobetasol (TEMOVATE) 0.05 % cream Apply 1 application topically daily as needed.     docusate sodium (COLACE) 100 MG capsule Take 1 capsule (100 mg total) by mouth 2 (two) times daily.    inhalation device (BREATHERITE VALVED MDI SPACER) Use as directed for inhalation.    lisdexamfetamine (VYVANSE) 40 MG Cap Take 40 mg by mouth once daily.    montelukast (SINGULAIR) 10 mg tablet Take 1 tablet (10 mg total) by mouth once daily.    oxycodone-acetaminophen (PERCOCET) 5-325 mg per tablet Take 1 tablet by mouth every 8 (eight) hours as needed for Pain.    trazodone (DESYREL) 100 MG tablet Take 100 mg by mouth nightly as needed for Insomnia.    triamcinolone acetonide 0.1% (KENALOG) 0.1 % ointment AAA bid x 2 weeks then prn, avoid chronic use    albuterol (PROAIR HFA) 90 mcg/actuation inhaler INHALE 1 PUFF EVERY 4 HOURS PRN for Wheezing    albuterol (PROVENTIL) 2.5 mg /3 mL (0.083 %) nebulizer solution Take 3 mLs (2.5 mg total) by nebulization every 6 (six) hours as needed for Wheezing.    FLONASE ALLERGY RELIEF 50 mcg/actuation nasal spray SHAKE WELL AND USE 2 SPRAYS IN EACH NOSTRIL EVERY DAY    levothyroxine (SYNTHROID) 100 MCG tablet Take 1 tablet (100 mcg total) by mouth once daily.    promethazine (PHENERGAN) 25 MG tablet Take 1 tablet (25 mg total) by mouth every 6 (six) hours as needed for Nausea.           Clinical Reference Information           Your Vitals Were     BP Pulse Temp Height Weight Last Period    109/71 57 97.4 °F (36.3 °C) (Oral) 5' (1.524 m) 75.7 kg (166 lb 14.2 oz) 04/03/2017    BMI                32.59 kg/m2          Blood Pressure          Most Recent Value    BP  109/71      Allergies as of 5/10/2017     Grapefruit    Pineapple    Augmentin [Amoxicillin-pot Clavulanate]    Penicillins    Tamiflu [Oseltamivir]      Immunizations  Administered on Date of Encounter - 5/10/2017     None      Language Assistance Services     ATTENTION: Language assistance services are available, free of charge. Please call [phone].      ATENCIÓN: Si habkaecy crystal, tiene a shea disposición servicios gratuitos de asistencia lingüística. Llame al [phone].     CHÚ Ý: N?u b?n nói Ti?ng Vi?t, có các d?ch v? h? tr? ngôn ng? mi?n phí dành cho b?n. G?i s? [phone].         [facility] complies with applicable Federal civil rights laws and does not discriminate on the basis of race, color, national origin, age, disability, or sex.

## 2017-05-10 NOTE — LETTER
May 10, 2017      Fanta Schuster MD  [id number] [address] [address]           [facility]  [address] [facility] [address] [place] [address]  Phone: [phone]          Patient: Carlos Coulter   MR Number: [id number]   YOB: 1990   Date of Visit: 5/10/2017       Dear Dr. Fanta Schuster:    Thank you for referring Carlos Coulter to me for evaluation. Attached you will find relevant portions of my assessment and plan of care.    If you have questions, please do not hesitate to call me. I look forward to following Carlos Coulter along with you.    Sincerely,    Wilbur Nieto MD    Enclosure  CC:  No Recipients    If you would like to receive this communication electronically, please contact [email or web address] or [phone] to request more information on [employer] access.    For providers and/or their staff who would like to refer a patient to [facility], please contact us through our one-stop-shop provider referral line, Jamaal Ghosh, at [phone].    If you feel you have received this communication in error or would no longer like to receive these types of communications, please e-mail [email or web address]

## 2017-05-10 NOTE — PROGRESS NOTES
Subjective:       Patient ID: Carlos Coulter is a 26 y.o. female.    Chief Complaint: Rectal Bleeding (x 4 days   every bowel movement )    HPI   The patient is a 26-year-old who is here today with rectal bleeding.  She has been having rectal bleeding for the past 4 days.  Every time she has a bowel movement, she has the rectal bleeding.  The bleeding turns the toilet water pink or red.  She typically has a bowel movement once or twice a day and so for the past 4 days she's been bleeding once or twice a day with every bowel movement.  She denies any significant constipation or straining with bowel movements for the past few days although she has had issues with constipation previously.  She denies any bleeding on her clothes in between bowel movements.  She denies any abdominal pain.  She denies any diarrhea.  She denies any nausea or vomiting change in appetite or change in weight    Review of Systems   Constitutional: Negative for appetite change, chills, diaphoresis, fatigue, fever and unexpected weight change.   HENT: Negative for congestion, ear pain, postnasal drip, rhinorrhea, sinus pressure, sneezing, sore throat and trouble swallowing.    Eyes: Negative for pain, discharge and visual disturbance.   Respiratory: Negative for cough, chest tightness, shortness of breath and wheezing.    Cardiovascular: Negative for chest pain, palpitations and leg swelling.   Gastrointestinal: Positive for anal bleeding. Negative for abdominal distention, abdominal pain, blood in stool, constipation, diarrhea, nausea and vomiting.   Skin: Negative for rash.       Objective:      Physical Exam   Constitutional: She is oriented to person, place, and time. She appears well-developed and well-nourished. No distress.   HENT:   Head: Normocephalic and atraumatic.   Right Ear: Hearing, tympanic membrane, external ear and ear canal normal.   Left Ear: Hearing, tympanic membrane, external ear and ear canal normal.   Nose:  Nose normal.   Mouth/Throat: Oropharynx is clear and moist and mucous membranes are normal. No oral lesions. No oropharyngeal exudate, posterior oropharyngeal edema or posterior oropharyngeal erythema.   Eyes: Conjunctivae, EOM and lids are normal. Pupils are equal, round, and reactive to light. No scleral icterus.   Neck: Normal range of motion. Neck supple. Carotid bruit is not present. No thyroid mass and no thyromegaly present.   Cardiovascular: Normal rate, regular rhythm and normal heart sounds.   No extrasystoles are present. PMI is not displaced.  Exam reveals no gallop.    No murmur heard.  Pulmonary/Chest: Effort normal and breath sounds normal. No accessory muscle usage. No respiratory distress.   Clear to auscultation bilaterally.   Abdominal: Soft. Normal appearance and bowel sounds are normal. She exhibits no abdominal bruit. There is no hepatosplenomegaly. There is no tenderness. There is no rebound.   Genitourinary:   Genitourinary Comments: Upon viewing the rectum, it appears that she has a small fissure at the 11:00 position.  There is no external hemorrhoid or other rectal irritation noted.  She refused a digital rectal exam   Lymphadenopathy:        Head (right side): No submental and no submandibular adenopathy present.        Head (left side): No submental and no submandibular adenopathy present.        Right cervical: No superficial cervical, no deep cervical and no posterior cervical adenopathy present.       Left cervical: No superficial cervical, no deep cervical and no posterior cervical adenopathy present.        Right: No supraclavicular adenopathy present.        Left: No supraclavicular adenopathy present.   Neurological: She is alert and oriented to person, place, and time.   Skin: Skin is warm, dry and intact.   Psychiatric: She has a normal mood and affect.     Blood pressure 110/82, pulse 61, temperature 97.7 °F (36.5 °C), temperature source Oral, resp. rate 16, height 5' (1.524  m), weight 76.9 kg (169 lb 8.5 oz), last menstrual period 04/03/2017.Body mass index is 33.11 kg/(m^2).          A/P:  1)  rectal bleeding possibly due to a fissure.  New.  We will check a CBC today.  If this is abnormal or if her bleeding continues, we will have her meet with the surgeon.  We did discuss using MiraLAX to make sure her stools are soft with no straining.  She will try sitz bath as well.  If she develops any new or worsening symptoms, she will let me know

## 2017-05-11 ENCOUNTER — CLINICAL SUPPORT (OUTPATIENT)
Dept: REHABILITATION | Facility: HOSPITAL | Age: 27
End: 2017-05-11
Attending: ORTHOPAEDIC SURGERY
Payer: MEDICAID

## 2017-05-11 DIAGNOSIS — M25.561 ACUTE PAIN OF RIGHT KNEE: Primary | ICD-10-CM

## 2017-05-11 PROCEDURE — 97110 THERAPEUTIC EXERCISES: CPT | Mod: PN | Performed by: PHYSICAL THERAPIST

## 2017-05-11 NOTE — PROGRESS NOTES
"[id number]  Date of Treatment: 05/11/2017   Diagnosis:   Encounter Diagnosis   Name Primary?    Acute pain of right knee Yes       Time in: 1400  Time Out: 1500  Total Treatment Time: 55  Group Time: 0      Subjective:    Carlos reports improvement of symptoms. Patient reported not doing quad sets nor SLR at home since last session.  Patient reports their pain to be 2/10 on a 0-10 scale with 0 being no pain and 10 being the worst pain imaginable.    Objective    Patient received individual therapy to increase strength, endurance, ROM, flexibility, posture and core stabilization with 0 patients with activities as follows:     Carlos received therapeutic exercises to develop strength, endurance, ROM, flexibility, posture and core stabilization for 55 minutes including:     -bike: full revolution x 10 minutes  -EFX: BWD 5 minutes with emphasis on left quad  -standing: gastroc stretch 3/30" on slant board level 3     Ambulatory tasks:cone walking: x 8 with emphasis on quad contraction (R) (previous)  NDT: on step sequence, proprioception purposes 5 minutes     -quad sets 5 x 30" with strap, towel under ankle    -SLR (R) 3/10  -S/L: hip abduction/adduction 3/10   -patella mobility(4-way)  -heel slides with board/strap AAROM: x 30  -prone hip extension 3/10 (R)  -(R) CLAM 3/10     -PROM: right knee flexion/extension, patella mobility  -prone: passive knee flexion with strap.  -Prone knee hangs x 10 minutes, 5# (pillow under knee)    R KNEE AROM Upon arrival Post tx   extension -2 1+ degrees   flexion 105 117 degrees, compensation       Gait: Patient ambulated modified independently with brace unlocked 100 ft.(continues)  Gait deviations: decreased abdi, decreased step length to RLE.    Written Home Exercises Provided: Continuation  Pt demo good understanding of the education provided. Carlos demonstrated good return demonstration of activities.     Assessment:   - " cueing on posture  -gait abnormality: cueing on heel strike to mid-stance  -right quad weakness  -improvement with right knee flexion: 117 degrees, muscle guarding    Pt will continue to benefit from skilled PT intervention. Medical Necessity is demonstrated by:  Unable to participate in daily activities and Weakness.    Patient is making good progress towards established goals.    New/Revised goals:   1. Pt will initiate HEP addressing right knee extension to 0 degrees for ambulatory purposes.. Ongoing  2. Pt will present with increased right quad MMT by one half grade for increased ease with functional ADLs. Met  3. Pt will report decreased pain to </= 5/10 Met  4. Pt will improve right knee passive flexion to 90 degrees in pain-free range for mobility purposes. Met  5. Patient able to negotiate 4 steps modified independently without painful limitations.Ongoing      New Short Term Goals Status:    Patient will increase hip abductor strength 1/2 grade for ambulatory purposes. Ongoing  Patient will ambulate independently 150 ft without extension lag for ADL purposes.Ongoing  Patient will demonstrate right knee flexion passively to 120 degrees for mobility purposes.        Plan:  Continue with established Plan of Care towards PT goals.

## 2017-05-16 ENCOUNTER — CLINICAL SUPPORT (OUTPATIENT)
Dept: REHABILITATION | Facility: HOSPITAL | Age: 27
End: 2017-05-16
Attending: ORTHOPAEDIC SURGERY
Payer: MEDICAID

## 2017-05-16 DIAGNOSIS — M25.561 ACUTE PAIN OF RIGHT KNEE: Primary | ICD-10-CM

## 2017-05-16 PROCEDURE — 97110 THERAPEUTIC EXERCISES: CPT | Mod: PN

## 2017-05-16 NOTE — PROGRESS NOTES
"[id number]  Date of Treatment: 05/16/2017   Diagnosis:   Encounter Diagnosis   Name Primary?    Acute pain of right knee Yes       Time in: 2:01  Time Out: 3:03  Total Treatment Time: 55  Group Time: 0      Subjective:    Carlos reports improvement of symptoms. States she has been doing exercises once every other day. Patient reports their pain to be 2/10 on a 0-10 scale with 0 being no pain and 10 being the worst pain imaginable.    Objective    Patient received individual therapy to increase strength, endurance, ROM, flexibility, posture and core stabilization with 0 patients with activities as follows:     Carlos received therapeutic exercises to develop strength, endurance, ROM, flexibility, posture and core stabilization for 55 minutes including:     -bike: full revolution (seat 3) x 8 minutes  -EFX: BWD 5 minutes with emphasis on left quad  -standing: gastroc stretch 3/30" on slant board level 3     Ambulatory tasks:cone walking: x 8 with emphasis on quad contraction (R) (previous)  NDT: on step sequence, proprioception purposes 5 minutes     -quad sets 5 x 30" with strap, towel under ankle    -SLR (R) 3/10  -S/L: hip abduction/adduction 3/10   -heel slides with board/strap AAROM: x 30  -prone hip extension 3/10 (R)  -(R) CLAM 3/10     -PROM: right knee flexion/extension, patella mobility  -prone: passive knee flexion with strap.  -Prone knee hangs x 10 minutes, 5# (pillow under knee) (NP)    R KNEE AROM Upon arrival Post tx   extension -2 1+ degrees   flexion 120 (AAROM w/ strap) 121 (PROM)       Gait: Patient ambulated modified independently with brace unlocked 100 ft.(continues)  Gait deviations: decreased abdi, decreased step length to RLE, mild extensor lag.    Written Home Exercises Provided: Continuation of current written HEP  Pt demo good understanding of the education provided. Carlos demonstrated good return demonstration of activities. "     Assessment:   Carlos demonstrated overall good tolerance to treatment session. Required frequent verbal cues for posture and quad recruitment. She continues with extensor lag during ambulation with decreased heel strike. Guarding of rectus femoris with quad sets as well as difficulty in hamstring relaxation to allow for knee extension. Overall improved R knee ROM however continues with limitations including decreased superior glide of patella and muscle guarding/pain. She will benefit from continued LE strengthening with progression into WB activity.    Pt will continue to benefit from skilled PT intervention. Medical Necessity is demonstrated by:  Unable to participate in daily activities and Weakness.    Patient is making good progress towards established goals.    New/Revised goals:   1. Pt will initiate HEP addressing right knee extension to 0 degrees for ambulatory purposes.. Ongoing  2. Pt will present with increased right quad MMT by one half grade for increased ease with functional ADLs. Met  3. Pt will report decreased pain to </= 5/10 Met  4. Pt will improve right knee passive flexion to 90 degrees in pain-free range for mobility purposes. Met  5. Patient able to negotiate 4 steps modified independently without painful limitations.Ongoing      New Short Term Goals Status:    Patient will increase hip abductor strength 1/2 grade for ambulatory purposes. Ongoing  Patient will ambulate independently 150 ft without extension lag for ADL purposes.Ongoing  Patient will demonstrate right knee flexion passively to 120 degrees for mobility purposes.        Plan:  Continue with established Plan of Care towards PT goals.

## 2017-05-18 ENCOUNTER — CLINICAL SUPPORT (OUTPATIENT)
Dept: REHABILITATION | Facility: HOSPITAL | Age: 27
End: 2017-05-18
Attending: ORTHOPAEDIC SURGERY
Payer: MEDICAID

## 2017-05-18 DIAGNOSIS — M25.561 ACUTE PAIN OF RIGHT KNEE: Primary | ICD-10-CM

## 2017-05-18 PROCEDURE — 97110 THERAPEUTIC EXERCISES: CPT | Mod: PN | Performed by: PHYSICAL THERAPIST

## 2017-05-18 NOTE — PROGRESS NOTES
"Name: Carlos Coulter  [facility] Number: [id number]  Date of Treatment: 05/18/2017   Diagnosis:   Encounter Diagnosis   Name Primary?    Acute pain of right knee Yes       Time in: 1400  Time Out: 1500  Total Treatment Time: 55  Group Time: 0      Subjective:    Carlos reports improvement of symptoms and and is starting to do some of the exercises.  Patient reports their pain to be 2/10 on a 0-10 scale with 0 being no pain and 10 being the worst pain imaginable.    Objective    Patient received individual therapy to increase strength, endurance, ROM, flexibility, posture and core stabilization with 0 patients with activities as follows:     Carlos received therapeutic exercises to develop strength, endurance, ROM, flexibility, posture and core stabilization for 55 minutes including:     Bike: full revolution x 10 minutes    -EFX: BWD 5 minutes with emphasis on left quad  -standing: gastroc stretch 3/30" on slant board level 3        Ambulatory tasks:cone walking: x 8 with emphasis on quad contraction (R) (previous)  NDT: on step sequence, proprioception purposes 5 minutes      -quad sets 5 x 30" with strap, towel under ankle     -SLR (R) 3/10  -S/L: hip abduction/adduction 3/10   -patella mobility(4-way)  -heel slides with board/strap AAROM: x 30  -prone hip extension 3/10 (R)  -(R) CLAM 3/10    -shuttle: bilateral 2B 3/10 (ending on heels with emphasis on extension)  -single leg: eccentric 2/10 (R), f/b single leg knee extension (concentric) 2/10        -PROM: right knee flexion/extension, patella mobility  -prone: passive knee flexion with strap.  -Prone knee hangs x 10 minutes, 5# (pillow under knee)     R KNEE AROM Upon arrival Post tx   extension -1 1+ degrees, continues   flexion 105 118 degrees, compensation         Gait: Patient ambulated modified independently with brace unlocked 100 ft.(continues)  Gait deviations: decreased abdi, decreased step length to RLE.     Written Home Exercises " Provided: Continuation  Pt demo good understanding of the education provided. Lavidia demonstrated good and fair return demonstration of activities.     Assessment:   -gait abnormality  -right quad weakness  -decreased coordination  -improving in right knee flexion    Pt will continue to benefit from skilled PT intervention. Medical Necessity is demonstrated by:  Unable to participate in daily activities, Continued inability to participate in vocational pursuits, Unable to participate fully in daily activities, Requires skilled supervision to complete and progress HEP and Weakness.    Patient is making good progress towards established goals.    Plan:  Continue with established Plan of Care towards PT goals.

## 2017-05-23 ENCOUNTER — OFFICE VISIT (OUTPATIENT)
Dept: ORTHOPEDICS | Facility: CLINIC | Age: 27
End: 2017-05-23
Payer: MEDICAID

## 2017-05-23 ENCOUNTER — TELEPHONE (OUTPATIENT)
Dept: FAMILY MEDICINE | Facility: CLINIC | Age: 27
End: 2017-05-23

## 2017-05-23 ENCOUNTER — CLINICAL SUPPORT (OUTPATIENT)
Dept: REHABILITATION | Facility: HOSPITAL | Age: 27
End: 2017-05-23
Attending: ORTHOPAEDIC SURGERY
Payer: MEDICAID

## 2017-05-23 ENCOUNTER — LAB VISIT (OUTPATIENT)
Dept: LAB | Facility: HOSPITAL | Age: 27
End: 2017-05-23
Attending: FAMILY MEDICINE
Payer: MEDICAID

## 2017-05-23 VITALS
WEIGHT: 166 LBS | BODY MASS INDEX: 32.59 KG/M2 | HEIGHT: 60 IN | DIASTOLIC BLOOD PRESSURE: 74 MMHG | HEART RATE: 66 BPM | SYSTOLIC BLOOD PRESSURE: 115 MMHG

## 2017-05-23 DIAGNOSIS — M25.561 ACUTE PAIN OF RIGHT KNEE: Primary | ICD-10-CM

## 2017-05-23 DIAGNOSIS — E03.4 HYPOTHYROIDISM DUE TO ACQUIRED ATROPHY OF THYROID: Primary | ICD-10-CM

## 2017-05-23 DIAGNOSIS — E03.4 HYPOTHYROIDISM DUE TO ACQUIRED ATROPHY OF THYROID: ICD-10-CM

## 2017-05-23 DIAGNOSIS — Z98.890 STATUS POST MEDIAL MENISCUS REPAIR: ICD-10-CM

## 2017-05-23 DIAGNOSIS — Z98.890 S/P ACL RECONSTRUCTION: ICD-10-CM

## 2017-05-23 DIAGNOSIS — Z98.890 STATUS POST LATERAL MENISCUS REPAIR: ICD-10-CM

## 2017-05-23 DIAGNOSIS — Z98.890 S/P RIGHT KNEE ARTHROSCOPY: Primary | ICD-10-CM

## 2017-05-23 LAB
T4 FREE SERPL-MCNC: 1.48 NG/DL
TSH SERPL DL<=0.005 MIU/L-ACNC: 0.28 UIU/ML

## 2017-05-23 PROCEDURE — 99024 POSTOP FOLLOW-UP VISIT: CPT | Mod: ,,, | Performed by: ORTHOPAEDIC SURGERY

## 2017-05-23 PROCEDURE — 97110 THERAPEUTIC EXERCISES: CPT | Mod: PN

## 2017-05-23 PROCEDURE — 99999 PR PBB SHADOW E&M-EST. PATIENT-LVL III: CPT | Mod: PBBFAC,,, | Performed by: ORTHOPAEDIC SURGERY

## 2017-05-23 PROCEDURE — 99213 OFFICE O/P EST LOW 20 MIN: CPT | Mod: PBBFAC,PO | Performed by: ORTHOPAEDIC SURGERY

## 2017-05-23 NOTE — PROGRESS NOTES
Subjective:          Chief Complaint: Carlos Coulter is a 26 y.o. female who had concerns including Post-op Evaluation and Pain of the Right Knee.    Ms. Coulter is here for f/u after her right knee surgery. She is using her brace as directed. Pain: 0/10. She is progressing with therapy however her knee extension is progressing slowly.     PREOPERATIVE DIAGNOSES:   1. Right knee anterior cruciate ligament tear.   2. Right knee medial meniscus tear.   3. Right knee lateral meniscus tear      POSTOPERATIVE DIAGNOSES:   1. Right knee anterior cruciate ligament tear.   2. Right knee medial meniscus tear.   3. Right knee lateral meniscus tear.      PROCEDURES:   1. Right knee anterior cruciate ligament reconstruction with ipsilateral   hamstring autograft AND gracilis allograft.   2. Right knee arthroscopic medial and lateral meniscus repair with lateral meniscus root repair  3. Right knee arthroscopic chondroplasty      SURGEON: Kalen Kaufman MD      ASSISTANTS: HALEY Mohan      COMPLICATIONS: None.       POSITION: Supine.       ANESTHESIA: General endotracheal plus right lower extremity femoral canal block.       COMPLICATIONS: None.       EBL: 20cc     EXAMINATION UNDER ANESTHESIA: Right knee, full range of motion, grade II B   positive Lachman, 2+ pivot shift. No opening to varus and mild valgus stress.       ARTHROSCOPIC FINDINGS:   1. Complete tearing, anterior cruciate ligament.   2. Medial meniscus posterior horn and body tear  3. Lateral meniscus posterior horn and meniscus root tear  4. Patellar chondromalacia Grade II  5. Medial femoral condyle chondromalacia Grade II      IMPLANTS:  RIDGID LOOP ADJUSTABLE BUTTON (STANDARD)  6 OMNI-SPAN MENISCAL REPAIR SUTURES  BIO-INTRAFIX TIBIAL SHEATH (LARGE)  BIO-INTRAFIX TAPERED SCREW (7-9X30MM)  MSKTF: Gracillis Tendon  6.5x25 mm cancellous screw  17mm spiked washer  #2 Orthocord      Pain   Pertinent negatives include no chills, fever or joint  swelling.       Review of Systems   Constitution: Negative for chills and fever.   Musculoskeletal: Positive for muscle weakness and stiffness. Negative for joint pain and joint swelling.   All other systems reviewed and are negative.                  Objective:        General: Carlos is well-developed, well-nourished, appears stated age, in no acute distress, alert and oriented to time, place and person.     General    Vitals reviewed.  Constitutional: She is oriented to person, place, and time. She appears well-developed and well-nourished. No distress.   HENT:   Head: Normocephalic and atraumatic.   Nose: Nose normal.   Eyes: Pupils are equal, round, and reactive to light.   Cardiovascular: Normal rate.    Pulmonary/Chest: Effort normal.   Neurological: She is alert and oriented to person, place, and time.   Psychiatric: She has a normal mood and affect. Her behavior is normal. Judgment and thought content normal.     General Musculoskeletal Exam   Gait: antalgic       Right Knee Exam     Inspection   Erythema: absent  Scars: present  Swelling: absent  Effusion: effusion  Deformity: deformity  Bruising: absent    Tenderness   The patient is experiencing no tenderness.         Range of Motion   Extension:  0 normal   Flexion:  110 abnormal     Tests   Ligament Examination Lachman: normal (-1 to 2mm) PCL-Posterior Drawer: normal (0 to 2mm)     MCL - Valgus: normal (0 to 2mm)  LCL - Varus: normal  Patella   Patellar Apprehension: negative  Passive Patellar Tilt: neutral  Patellar Tracking: normal  Patellar Glide (quadrants): Lateral - 1   Medial - 1  Q-Angle at 90 degrees: normal    Other   Sensation: normal    Comments:  Incisions healed; no signs of infection    Muscle Strength   Right Lower Extremity   Hip Abduction: 5/5   Quadriceps:  5/5   Hamstrin/5     Vascular Exam     Right Pulses  Dorsalis Pedis:      2+  Posterior Tibial:      2+        Edema  Right Lower Leg: absent              Assessment:        Encounter Diagnoses   Name Primary?    S/P right knee arthroscopy Yes    S/P ACL reconstruction     Status post lateral meniscus repair     Status post medial meniscus repair     Post-op pain           Plan:         Discontinue brace use except for at night she needs to sleep in it locked in extension for now  Continue with therapy per protocol  F/U in 4 weeks or sooner if needed

## 2017-05-23 NOTE — PROGRESS NOTES
"[id number]  Date of Treatment: 2017   Diagnosis:   Encounter Diagnosis   Name Primary?    Acute pain of right knee Yes       Time in: 2:02  Time Out: 3:08  Total Treatment Time: 60  Group Time: 0      Subjective:    Carlos reports continued improvement in the R knee, has been slightly more compliant with HEP. Saw Dr. Kaufman earlier today, and no longer has to wear brace during the day. Continued stiffness in the knee with AROM. Patient reports their pain to be 0/10 on a 0-10 scale with 0 being no pain and 10 being the worst pain imaginable.    Objective    Patient received individual therapy to increase strength, endurance, ROM, flexibility, posture and core stabilization with 0 patients with activities as follows:     Carlos received therapeutic exercises to develop strength, endurance, ROM, flexibility, posture and core stabilization for 60 minutes including:     Bike: full revolution x 5 minutes    -EFX: BWD 5 minutes with emphasis on Right quad  -standing: gastroc stretch 3/30" on slant board level 3    Ambulatory tasks:cone walking: x 8 with emphasis on quad contraction (R)   NDT: on step sequence, proprioception purposes 5 minutes      -quad sets 5 x 30" with strap, towel under ankle     -SLR (R) 3/10  -S/L: hip abduction/adduction 3/10 (NP)  -heel slides with board/strap AAROM: x 30  -prone hip extension 3/10 (R) (NP)  -(R) CLAM 3/10  -forward step up on 4" step with RTB for TKE 3x10   -Shuttle: bilateral 2.5B 3/10 (ending on heels with emphasis on extension)  -Shuttle single le-1 eccentric 2/10 (R) with 2B, f/b single leg knee extension (concentric) 2/10 with 1.5B    -PROM: right knee flexion/extension, patella mobility  -prone: passive knee flexion with strap. (NP)  -Prone knee hangs x 3 minutes, 5# (pillow under knee)     R KNEE AROM Upon arrival Post tx   extension -1 1+ degrees, following manual   flexion 108 118 degrees, hip compensation " "      Gait: Patient ambulated modified independently without brace x 100 ft.(continues)  Gait deviations: decreased abdi, decreased step length to RLE with limited knee extension.     Written Home Exercises Provided: Continuation of current written HEP.   Pt demo good understanding of the education provided. Lavriky demonstrated good and fair return demonstration of activities.     Assessment:   Pt with overall good tolerance to treatment session. She continues with gait abnormality, R extensor lag due to quad weakness. Required mod verbal cues for eccentric lowering on shuttle to decrease compensation of gastroc. She continues with right quad and hip weakness, apparent with forward step up on 4" step with compensation of L hip drop and backward lean with step-down. R knee flexion slowly improving however remains limited in both flexion and extension due to muscle guarding.     Pt will continue to benefit from skilled PT intervention. Medical Necessity is demonstrated by:  Unable to participate in daily activities, Continued inability to participate in vocational pursuits, Unable to participate fully in daily activities, Requires skilled supervision to complete and progress HEP and Weakness.    Patient is making good progress towards established goals.    Plan:  Continue with established Plan of Care towards PT goals.   "

## 2017-05-25 ENCOUNTER — CLINICAL SUPPORT (OUTPATIENT)
Dept: REHABILITATION | Facility: HOSPITAL | Age: 27
End: 2017-05-25
Attending: ORTHOPAEDIC SURGERY
Payer: MEDICAID

## 2017-05-25 DIAGNOSIS — M25.561 ACUTE PAIN OF RIGHT KNEE: Primary | ICD-10-CM

## 2017-05-25 PROCEDURE — 97110 THERAPEUTIC EXERCISES: CPT | Mod: PN | Performed by: PHYSICAL THERAPIST

## 2017-05-25 NOTE — PLAN OF CARE
"TIME RECORD    Date: 05/25/2017    Start Time:  1500  Stop Time:  1600    PROCEDURES:    TIMED  Procedure Time Min.   TE Start:1500  Stop:1555 55    Start:  Stop:     Start:  Stop:     Start:  Stop:          UNTIMED  Procedure Time Min.    Start:  Stop:     Start:  Stop:      Total Timed Minutes:  55  Total Timed Units:  4  Total Untimed Units:  0  Charges Billed/# of units:  4    PHYSICAL THERAPY UPDATED PLAN OF TREATMENT     Patient name: Carlos Coulter  Onset Date: 03/09/2017   SOC Date:  03/10/2017  Primary Diagnosis:    1. Acute pain of right knee          S/P ACL reconstruction, 3/9/2017      Status post lateral meniscus repair      Status post medial meniscus repair        Treatment Diagnosis:  Impaired functional mobility with right knee pain, gait abnormality  Precautions:    Continue with therapy per protocol  Visits from SOC: 22     Functional Level Prior to SOC:  Per previous note.    1. Right knee anterior cruciate ligament reconstruction with ipsilateral   hamstring autograft AND gracilis allograft.   2. Right knee arthroscopic medial and lateral meniscus repair with lateral meniscus root repair  3. Right knee arthroscopic chondroplasty     Updated Assessment:      S: Patient reported not being compliant with HEP. Has not done prone knee hangs at home for knee extension purposes.    O: TE to address LE strength, core, knee mobility, gait for ADL purposes. (55 minutes)    -EFX: 5 minutes BWD    - ambulatory tasks:cone walking: x 8 with emphasis on quad contraction (R)  -quad sets 5 x 30" with strap, towel under ankle (previous)  -SLR (R) 3/10  -S/L: hip abduction/adduction 3/10   -patella mobility(4-way)  -T-ball warm up with knee flexion x 20  -prone hip extension 3/10 (R) (previous)    -standing at EOT: knee extension purposes    -shuttle: bilateral  Eccentric (2/1) 2B 2/10, (R) single leg knee extension with end-range DF 3/10 (1B)    -prone knee hangs x 10 minutes, 5#   PROM: right " knee extension over half foam roll with over-pressure x 3 to improve hyper-extension compared to left knee (gait purposes)   A:  -muscle guarding noted.   -A/PROM:right knee  Flexion: 108/119 degrees  Extension: -3/0 degrees     Gait: Patient ambulated modified independently up to 50 ft with emphasis on quad activation.    P: continue with POC    Previous Short Term Goals Status:   Short Term Goals: 6 weeks  1. Pt will initiate HEP addressing right knee extension to 0 degrees for ambulatory purposes.. Ongoing  2. Pt will present with increased right quad MMT by one half grade for increased ease with functional ADLs. Met  3. Pt will report decreased pain to </= 5/10 Met  4. Pt will improve right knee passive flexion to 90 degrees in pain-free range for mobility purposes. Met  5. Patient able to negotiate 4 steps modified independently without painful limitations.Ongoing    6.Patient will increase hip abductor strength 1/2 grade for ambulatory purposes. Ongoing  7.Patient will ambulate independently 150 ft without extension lag for ADL purposes. Not Met  8. Patient will demonstrate right knee flexion passively to 120 degrees for mobility purposes. Ongoing     Long Term Goal Status:   continue per initial plan of care.  1. Patient will return to community ambulation independently with right knee active motion: 0-120+ degrees with < 3/10 pain.  2. Patient will demonstrate right lower extremity strength 4+ to 5/5 for standing/mobility purposes.  3. Patient will demonstrate right single leg squat from 0-60 degrees for stability/dynamis purposes.  4. Pt will be independent with HEP and self management of symptoms     Reasons for Recertification of Therapy:    Patient S/P: 3/9/2017  1. Right knee anterior cruciate ligament reconstruction with ipsilateral   hamstring autograft AND gracilis allograft.   2. Right knee arthroscopic medial and lateral meniscus repair with lateral meniscus root repair  3. Right knee arthroscopic  chondroplasty    Patient ambulating without AD, modified independently (increase time noted) with gait deviations: decreased abdi, decreased step length with extension lag noted of RLE. (gait abnormality)  Decreased neuromuscular control of the LLE, genu valgus noted  -quad weakness with decreased hip abduction noted as well (strength deficits)  -impaired dynamic balance  -impaired transfer skills    A/PROM:right knee  Flexion: 108/119 degrees  Extension: -3/0 degrees    Certification Period: 03/10/2017 to 08/24/2017 continues...from previous evaluation  Recommended Treatment Plan: 2-3 times per week for 24 weeks: Electrical Stimulation NMES/TENS, Gait Training, Group Therapy, Manual Therapy, Moist Heat/ Ice, Neuromuscular Re-ed, Patient Education, Self Care, Therapeutic Activites and Therapeutic Exercise  Other Recommendations: Thank you or consult.      Therapist's Name: Michele Trujillo PT, DPT  Date: 05/25/2017    I CERTIFY THE NEED FOR THESE SERVICES FURNISHED UNDER THIS PLAN OF TREATMENT AND WHILE UNDER MY CARE    Physician's comments: ________________________________________________________________________________________________________________________________________________      Physician's Name: ___________________________________

## 2017-05-30 ENCOUNTER — OFFICE VISIT (OUTPATIENT)
Dept: FAMILY MEDICINE | Facility: CLINIC | Age: 27
End: 2017-05-30
Payer: MEDICAID

## 2017-05-30 ENCOUNTER — CLINICAL SUPPORT (OUTPATIENT)
Dept: REHABILITATION | Facility: HOSPITAL | Age: 27
End: 2017-05-30
Attending: ORTHOPAEDIC SURGERY
Payer: MEDICAID

## 2017-05-30 VITALS
HEIGHT: 60 IN | DIASTOLIC BLOOD PRESSURE: 62 MMHG | HEART RATE: 64 BPM | TEMPERATURE: 98 F | WEIGHT: 169.31 LBS | SYSTOLIC BLOOD PRESSURE: 100 MMHG | BODY MASS INDEX: 33.24 KG/M2 | RESPIRATION RATE: 16 BRPM

## 2017-05-30 DIAGNOSIS — H65.01 RIGHT ACUTE SEROUS OTITIS MEDIA, RECURRENCE NOT SPECIFIED: Primary | ICD-10-CM

## 2017-05-30 DIAGNOSIS — M25.561 ACUTE PAIN OF RIGHT KNEE: Primary | ICD-10-CM

## 2017-05-30 DIAGNOSIS — H92.01 OTALGIA, RIGHT: ICD-10-CM

## 2017-05-30 PROCEDURE — 97110 THERAPEUTIC EXERCISES: CPT | Mod: PN

## 2017-05-30 PROCEDURE — 99214 OFFICE O/P EST MOD 30 MIN: CPT | Mod: S$GLB,,, | Performed by: NURSE PRACTITIONER

## 2017-05-30 RX ORDER — AZITHROMYCIN 250 MG/1
TABLET, FILM COATED ORAL
Qty: 6 TABLET | Refills: 0 | Status: SHIPPED | OUTPATIENT
Start: 2017-05-30 | End: 2017-06-05 | Stop reason: ALTCHOICE

## 2017-05-30 RX ORDER — NEOMYCIN SULFATE, POLYMYXIN B SULFATE, HYDROCORTISONE 3.5; 10000; 1 MG/ML; [USP'U]/ML; MG/ML
3 SOLUTION/ DROPS AURICULAR (OTIC) 4 TIMES DAILY
Qty: 10 ML | Refills: 0 | Status: SHIPPED | OUTPATIENT
Start: 2017-05-30 | End: 2017-06-09

## 2017-05-30 NOTE — PROGRESS NOTES
Subjective:       Patient ID: Carlos Coulter is a 26 y.o. female.    Chief Complaint: right ear pain x's one week    HPI pain in the right ear about a week ago. Tried cleaning it out at home but it hurts too bad. No fever. No sore throat or sinus congestion. Decreased hearing in that ear. Pain is getting worse instead of better. No drainage from the ear. Some allergy symptoms but those are controlled pretty good with her daily allergy medication. No other concerns. See ROS.    The following portion of the patients history was reviewed and updated as appropriate: allergies, current medications, past medical and surgical history. Past social history and problem list reviewed. Family PMH and Past social history reviewed. Tobacco, Illicit drug use reviewed.     Review of Systems   Constitutional: Negative for appetite change, chills, fatigue and fever.   HENT: Positive for ear pain. Negative for congestion, postnasal drip, rhinorrhea, sinus pressure, sneezing and sore throat.    Respiratory: Negative for cough, chest tightness, shortness of breath and wheezing.              Cardiovascular: Negative for chest pain and palpitations.   Gastrointestinal: Negative for abdominal pain, constipation, diarrhea, nausea and vomiting.   Musculoskeletal: Negative for myalgias.   Neurological: Negative for headaches.       Objective:       /62   Pulse 64   Temp 98 °F (36.7 °C) (Oral)   Resp 16   Ht 5' (1.524 m)   Wt 76.8 kg (169 lb 5 oz)   LMP 05/09/2017   BMI 33.07 kg/m²      Physical Exam    Constitutional: oriented to person, place, and time. well-developed and well-nourished.   HENT: nares boggy. No facial pressure. Canals normal.  TM left clear. Right TM with dullness, erythema. Throat with clear PND.   Head: Normocephalic.   Eyes: Conjunctivae are normal. Pupils are equal, round, and reactive to light.   Neck: Normal range of motion. Neck supple. No tracheal deviation present.  No enlarged or tender  anterior cervical lymph nodes.   Cardiovascular: Normal rate, regular rhythm and normal heart sounds.    Pulmonary/Chest: Effort normal and breath sounds normal. No respiratory distress. No wheezes.   Abdominal: Soft. Bowel sounds are normal. No distension. There is no tenderness.   Musculoskeletal: Normal range of motion. Gait and coordination normal.   Neurological: oriented to person, place, and time.   Skin: Skin is warm and dry. No rashes or lesions  Psychiatric: Normal mood and affect.Behavior is normal. Judgment and thought content normal.   Assessment:       1. Right acute serous otitis media, recurrence not specified    2. Otalgia, right        Plan:     Carlos was seen today for right ear pain x's one week.    Diagnoses and all orders for this visit:    Right acute serous otitis media, recurrence not specified: will need to cover with antibiotics. Take as directed.     Otalgia, right    Carlos was seen today for right ear pain x's one week.    Diagnoses and all orders for this visit:    Other orders  -     neomycin-polymyxin-hydrocortisone (CORTISPORIN) otic solution; Place 3 drops into the right ear 4 (four) times daily.  -     azithromycin (ZITHROMAX Z-SANIA) 250 MG tablet; Take two tablets today then one daily until daily          Continue current medication  Take medications only as prescribed  Healthy diet, exercise  Adequate rest  Adequate hydration  Avoid allergens  Avoid excessive caffeine

## 2017-05-30 NOTE — PROGRESS NOTES
"Name: Carlos Coulter  [facility] Number: [id number]  Date of Treatment: 05/30/2017   Diagnosis:   Encounter Diagnosis   Name Primary?    Acute pain of right knee Yes       Time in: 3:02  Time Out: 4:05  Total Treatment Time: 60  Group Time: 0      Subjective:    Carlos reports increased stiffness and swelling in the R knee. States she was playing with her niece and nephew over the weekend which caused some soreness. Patient reports their pain and stiffness to be 4/10 on a 0-10 scale with 0 being no pain and 10 being the worst pain imaginable.    Objective    Patient received individual therapy to increase strength, endurance, ROM, flexibility, posture and core stabilization with 0 patients with activities as follows:     Carlos received therapeutic exercises to develop strength, endurance, ROM, flexibility, posture and core stabilization for 60 minutes including:     -EFX: BWD 5 minutes with emphasis on Right quad  -standing: gastroc stretch 3/30" on slant board level 3     - ambulatory tasks:forward and lateral walking in // bars, emphasis on quad contraction (R)  -quad sets 5 x 30" with strap, towel under ankle (previous)  -SLR (R) 3/10  -S/L: hip abduction/adduction 3/10 (vc for technique)   -patella mobility(4-way)  -T-ball warm up with knee flexion x 20  -prone hip extension 3/10 (R) (previous)     -standing at EOT: TKE with GTB 20 x 5" for knee extension purposes  -forward step up on 4" step 2x10 (previous)  -shuttle: bilateral  Eccentric (2/1) 2B 2/10, (R) single leg knee extension with end-range DF 3/10 (1.5B)     -prone knee hangs x 10 minutes, 5# (previous)  PROM: right knee extension over half foam roll with over-pressure x 3 to improve hyper-extension compared to left knee (gait purposes)      R knee ROM not measured this date  R KNEE AROM Upon arrival Post tx   extension -3 1+ degrees, following manual   flexion 108 118 degrees, hip compensation      Gait: Patient ambulated modified " independently without brace x 100 ft.(continues)  Gait deviations: decreased abdi, decreased step length to RLE with limited knee extension.     Written Home Exercises Provided: Continuation of current written HEP.   Pt demo good understanding of the education provided. Carlos demonstrated good and fair return demonstration of activities.     Assessment:   Pt with fair to good tolerance of treatment session. She continues with R quad weakness and extensor lag, mild improvement following manual treatment. Gait abnormality with ambulation due to extensor lag. Pt required mod verbal cues for eccentric lowering on shuttle to decrease compensation of gastroc and maintain proper knee alignment. Muscle guarding continues with knee flexion and extension. Will benefit from continued strengthening and increased compliance of HEP.    Pt will continue to benefit from skilled PT intervention. Medical Necessity is demonstrated by:  Unable to participate in daily activities, Continued inability to participate in vocational pursuits, Unable to participate fully in daily activities, Requires skilled supervision to complete and progress HEP and Weakness.    Patient is making good progress towards established goals.    Plan:  Continue with established Plan of Care towards PT goals.

## 2017-06-05 ENCOUNTER — OFFICE VISIT (OUTPATIENT)
Dept: FAMILY MEDICINE | Facility: CLINIC | Age: 27
End: 2017-06-05
Payer: MEDICAID

## 2017-06-05 VITALS
SYSTOLIC BLOOD PRESSURE: 104 MMHG | HEART RATE: 60 BPM | WEIGHT: 168.63 LBS | HEIGHT: 60 IN | DIASTOLIC BLOOD PRESSURE: 62 MMHG | RESPIRATION RATE: 18 BRPM | TEMPERATURE: 97 F | BODY MASS INDEX: 33.11 KG/M2

## 2017-06-05 DIAGNOSIS — H61.22 IMPACTED CERUMEN OF LEFT EAR: Primary | ICD-10-CM

## 2017-06-05 DIAGNOSIS — H92.01 OTALGIA OF RIGHT EAR: ICD-10-CM

## 2017-06-05 DIAGNOSIS — H92.01 RIGHT EAR PAIN: ICD-10-CM

## 2017-06-05 PROCEDURE — 69210 REMOVE IMPACTED EAR WAX UNI: CPT | Mod: S$GLB,,, | Performed by: NURSE PRACTITIONER

## 2017-06-05 PROCEDURE — 99214 OFFICE O/P EST MOD 30 MIN: CPT | Mod: 25,S$GLB,, | Performed by: NURSE PRACTITIONER

## 2017-06-05 RX ORDER — LEVOTHYROXINE SODIUM 100 UG/1
TABLET ORAL
Qty: 30 TABLET | Refills: 0 | Status: CANCELLED | OUTPATIENT
Start: 2017-06-05

## 2017-06-05 RX ORDER — LEVOTHYROXINE SODIUM 100 UG/1
100 TABLET ORAL DAILY
Qty: 30 TABLET | Refills: 1 | Status: CANCELLED | OUTPATIENT
Start: 2017-06-05 | End: 2018-06-05

## 2017-06-06 ENCOUNTER — CLINICAL SUPPORT (OUTPATIENT)
Dept: REHABILITATION | Facility: HOSPITAL | Age: 27
End: 2017-06-06
Attending: ORTHOPAEDIC SURGERY
Payer: MEDICAID

## 2017-06-06 DIAGNOSIS — M25.561 ACUTE PAIN OF RIGHT KNEE: Primary | ICD-10-CM

## 2017-06-06 PROCEDURE — 97110 THERAPEUTIC EXERCISES: CPT | Mod: PN | Performed by: PHYSICAL THERAPIST

## 2017-06-06 NOTE — PROCEDURES
Ear Cerumen Removal  Date/Time: 6/5/2017 2:49 PM  Performed by: OREN JI  Authorized by: OREN JI     Consent Done?:  Yes (Verbal)    Local anesthetic:  None  Ceruminolytics applied: Ceruminolytics applied prior to the procedure    Medication Used:  Debrox  Location details:  Left ear  Procedure type: irrigation    Cerumen  Removal Results:  Cerumen completely removed  Patient tolerance:  Patient tolerated the procedure well with no immediate complications     Curette and irrigation used to remove wax, tolerated well.

## 2017-06-06 NOTE — PROGRESS NOTES
"Name: Carlos Coulter  [facility] Number: [id number]  Date of Treatment: 06/06/2017   Diagnosis:   Encounter Diagnosis   Name Primary?    Acute pain of right knee Yes       Time in: 1600  Time Out: 1700  Total Treatment Time: 55  Group Time: 0      Subjective:    Carlos reported having fluid in the ear and will be following up with MD/ENT soon.  Patient reported only doing some of the exercises and has been getting encouragement from mother. Patient reports their pain and stiffness to be 2/10 on a 0-10 scale with 0 being no pain and 10 being the worst pain imaginable.    Objective    Patient received individual therapy to increase strength, endurance, ROM, flexibility, posture and core stabilization with 0 patients with activities as follows:     Carlos received therapeutic exercises to develop strength, endurance, ROM, flexibility, posture and core stabilization for 55 minutes including:     -EFX: BWD 5 minutes with emphasis on Right quad    -standing: gastroc stretch 3/30" on slant board level 3     - ambulatory tasks: using the orange/grey floor x 2 with emphasis on quad    -standing: bilateral knee extension with DF 10 x 10"    -quad sets 5 x 30" with strap, towel under ankle  -SLR (R) 3/10  -S/L: hip abduction/adduction 3/10 (vc for technique)     -patella mobility(4-way)     -standing at EOT: TKE with GTB 20 x 5" for knee extension purposes  -forward step up on 4" step 2x10     -shuttle: bilateral  Eccentric (2/1) 2B 2/10, (R) single leg knee extension with end-range DF 3/10 (2 B)     -prone knee hangs x 15 minutes, 5#   -supine: with OP for right knee extension noted.    PROM: right knee extension over half foam roll with over-pressure x 3 to improve hyper-extension compared to left knee (gait purposes)      R knee ROM not measured this date: -5/115 degrees       Gait: Patient ambulated modified independently without brace x 100 ft.(continues)    Gait deviations: decreased abdi, decreased step " length to RLE with limited knee extension.     Written Home Exercises Provided: Continuation of current written HEP.   Pt demo good understanding of the education provided. Lavidia demonstrated good and fair return demonstration of activities.     Assessment:   - improvement in active right knee flexion.  -Cueing on posture.  -Extension lag noted with gait  Right quad weakness, abductor as well    Pt will continue to benefit from skilled PT intervention. Medical Necessity is demonstrated by:  Unable to participate in daily activities, Continued inability to participate in vocational pursuits, Unable to participate fully in daily activities, Requires skilled supervision to complete and progress HEP and Weakness.    Patient is making good progress towards established goals.    Plan:  Continue with established Plan of Care towards PT goals.

## 2017-06-06 NOTE — PROGRESS NOTES
Subjective:       Patient ID: Carlos Coulter is a 26 y.o. female.    Chief Complaint: Otalgia and Cough    Otalgia    There is pain in both ears. This is a recurrent problem. The current episode started 1 to 4 weeks ago. The problem occurs constantly. The problem has been unchanged. There has been no fever. The pain is at a severity of 6/10. Pertinent negatives include no abdominal pain, coughing, diarrhea, headaches, rhinorrhea, sore throat or vomiting. She has tried antibiotics and acetaminophen for the symptoms. The treatment provided no relief.   Cough   Associated symptoms include ear pain. Pertinent negatives include no chest pain, chills, fever, headaches, myalgias, postnasal drip, rhinorrhea, sore throat, shortness of breath or wheezing.    pain in the right ear about a week ago. Tried cleaning it out at home but it hurts too bad. No fever. No sore throat or sinus congestion. Decreased hearing in that ear. Pain is getting worse instead of better. No drainage from the ear. Some allergy symptoms but those are controlled pretty good with her daily allergy medication. No other concerns. See ROS.  Treated last week with antibiotics and ear drops. She states did not make a difference. She wants to see ENT.     The following portion of the patients history was reviewed and updated as appropriate: allergies, current medications, past medical and surgical history. Past social history and problem list reviewed. Family PMH and Past social history reviewed. Tobacco, Illicit drug use reviewed.     Review of Systems   Constitutional: Negative for appetite change, chills, fatigue and fever.   HENT: Positive for ear pain. Negative for congestion, postnasal drip, rhinorrhea, sinus pressure, sneezing and sore throat.    Respiratory: Negative for cough, chest tightness, shortness of breath and wheezing.              Cardiovascular: Negative for chest pain and palpitations.   Gastrointestinal: Negative for abdominal  pain, constipation, diarrhea, nausea and vomiting.   Musculoskeletal: Negative for myalgias.   Neurological: Negative for headaches.       Objective:       /62 (BP Location: Left arm, Patient Position: Sitting, BP Method: Manual)   Pulse 60   Temp 97.3 °F (36.3 °C) (Oral)   Resp 18   Ht 5' (1.524 m)   Wt 76.5 kg (168 lb 10.4 oz)   LMP 05/09/2017   BMI 32.94 kg/m²      Physical Exam    Constitutional: oriented to person, place, and time. well-developed and well-nourished.   HENT: nares boggy. No facial pressure. Canals normal on right . Left is blocked by cerumen.  TM left clear. Right TM with normal light reflex Throat with clear PND.   Head: Normocephalic.   Eyes: Conjunctivae are normal. Pupils are equal, round, and reactive to light.   Neck: Normal range of motion. Neck supple. No tracheal deviation present.  No enlarged or tender anterior cervical lymph nodes.   Cardiovascular: Normal rate, regular rhythm and normal heart sounds.    Pulmonary/Chest: Effort normal and breath sounds normal. No respiratory distress. No wheezes.   Abdominal: Soft. Bowel sounds are normal. No distension. There is no tenderness.   Musculoskeletal: Normal range of motion. Gait and coordination normal.   Neurological: oriented to person, place, and time.   Skin: Skin is warm and dry. No rashes or lesions  Psychiatric: Normal mood and affect.Behavior is normal. Judgment and thought content normal.   Assessment:       1. Impacted cerumen of left ear    2. Right ear pain    3. Otalgia of right ear        Plan:     Carlos was seen today for otalgia and cough.    Diagnoses and all orders for this visit:    Impacted cerumen of left ear: wax removed. See procedure noted.  -     Ear wax removal    Right ear pain: will refer to ENT. Do not see anything that is wrong with the ear.   -     Ambulatory consult to ENT    Otalgia of right ear      Continue current medication  Take medications only as prescribed  Healthy diet,  exercise  Adequate rest  Adequate hydration  Avoid allergens  Avoid excessive caffeine

## 2017-06-07 RX ORDER — LEVOTHYROXINE SODIUM 100 UG/1
100 TABLET ORAL DAILY
Qty: 30 TABLET | Refills: 1 | Status: SHIPPED | OUTPATIENT
Start: 2017-06-07 | End: 2017-07-08

## 2017-06-07 NOTE — TELEPHONE ENCOUNTER
----- Message from Tash Novoa sent at 6/7/2017 12:28 PM CDT -----  levothyroxine (SYNTHROID) 100 MCG tablet refill    [phone] ([place])       [id number] [address] [place] [address] [address] [address] [address] [place] [id number]  Phone: [phone] Fax: [phone]

## 2017-06-08 ENCOUNTER — CLINICAL SUPPORT (OUTPATIENT)
Dept: REHABILITATION | Facility: HOSPITAL | Age: 27
End: 2017-06-08
Attending: ORTHOPAEDIC SURGERY
Payer: MEDICAID

## 2017-06-08 DIAGNOSIS — H92.09 EAR PAIN, UNSPECIFIED LATERALITY: Primary | ICD-10-CM

## 2017-06-08 DIAGNOSIS — M25.561 ACUTE PAIN OF RIGHT KNEE: Primary | ICD-10-CM

## 2017-06-08 PROCEDURE — 97110 THERAPEUTIC EXERCISES: CPT | Mod: PN

## 2017-06-08 NOTE — PROGRESS NOTES
"Name: Carlos Coulter  [facility] Number: [id number]  Date of Treatment: 06/08/2017   Diagnosis:   Encounter Diagnosis   Name Primary?    Acute pain of right knee Yes       Time in: 1:06   Time Out: 2:02  Total Treatment Time: 55  Group Time: 0      Subjective:    Carlos reported continued soreness in B ears this date, seeing an ENT soon. She reports continued soreness and pain in the knee, as well as non-compliance with HEP. Patient reports their pain and stiffness to be 2/10 on a 0-10 scale with 0 being no pain and 10 being the worst pain imaginable.    Objective    Patient received individual therapy to increase strength, endurance, ROM, flexibility, posture and core stabilization with 0 patients with activities as follows:     Carlos received therapeutic exercises to develop strength, endurance, ROM, flexibility, posture and core stabilization for 55 minutes including:     -EFX: BWD x 5 minutes with emphasis on Right quad and decreased R hip elevation  -prone knee hangs x 10 minutes, 5#   -supine: with OP for right knee extension noted.  -standing: gastroc stretch 3/30" on slant board level 3     -ambulatory tasks: using the orange/grey floor x 2 with emphasis on quad  -standing at EOT: TKE with BTB 20 x 10" for knee extension purposes  - TKE with BTB and lateral weight shift onto R LE (vc for prevention of L hip drop) 10 x 10"  - ball squats on wall with orange sball (vc for hip disassociation) 2x10  -forward step up on 4" step 2x10   -standing: bilateral knee extension with DF 10 x 10"  -quad sets: 1/2 roll under ankle and towel roll under knee, 20 x 10"    Exercises not performed this date:  -SLR (R) 3/10  -S/L: hip abduction/adduction 3/10 (vc for technique)   -patella mobility(4-way)   -shuttle: bilateral Eccentric (2/1) 2B 2/10, (R) single leg knee extension with end-range DF 3/10 (2 B) (NP)     PROM: right knee extension over half foam roll with over-pressure x 3 to improve hyper-extension compared " to left knee (gait purposes)       Gait: Patient ambulated modified independently without brace x 100 ft.(continues)    Gait deviations: decreased abdi, decreased step length to RLE with limited knee extension.     Written Home Exercises Provided: Continuation of current written HEP.   Pt demo good understanding of the education provided. Carlos demonstrated good and fair return demonstration of activities.     Assessment:   Exercises progressed for functional movement to improve R TKE, hip dissociation during eccentric knee flexion, and prevention of R hip compensation during eccentric knee movements. Carlos demonstrated improved understanding of R gluteal activation during stance phase of ambulation which improved abdi, heel strike and quad and glut activation. She also demonstrated greater R glut activation during eccentric knee flexion for step downs following ball squats on wall. She continues with R quad and hip weakness yet is making slow gains in strength and stability of R LE.    Pt will continue to benefit from skilled PT intervention. Medical Necessity is demonstrated by:  Unable to participate in daily activities, Continued inability to participate in vocational pursuits, Unable to participate fully in daily activities, Requires skilled supervision to complete and progress HEP and Weakness.    Patient is making good progress towards established goals.    Plan:  Continue with established Plan of Care towards PT goals.

## 2017-06-09 ENCOUNTER — OFFICE VISIT (OUTPATIENT)
Dept: OTOLARYNGOLOGY | Facility: CLINIC | Age: 27
End: 2017-06-09
Payer: MEDICAID

## 2017-06-09 ENCOUNTER — CLINICAL SUPPORT (OUTPATIENT)
Dept: AUDIOLOGY | Facility: CLINIC | Age: 27
End: 2017-06-09
Payer: MEDICAID

## 2017-06-09 VITALS — HEIGHT: 60 IN | BODY MASS INDEX: 32.39 KG/M2 | WEIGHT: 165 LBS

## 2017-06-09 DIAGNOSIS — K05.10 GINGIVITIS: ICD-10-CM

## 2017-06-09 DIAGNOSIS — H92.01 REFERRED OTALGIA, RIGHT: Primary | ICD-10-CM

## 2017-06-09 DIAGNOSIS — K02.9 DENTAL CARIES: ICD-10-CM

## 2017-06-09 PROCEDURE — 99999 PR PBB SHADOW E&M-EST. PATIENT-LVL III: CPT | Mod: PBBFAC,,, | Performed by: NURSE PRACTITIONER

## 2017-06-09 PROCEDURE — 99213 OFFICE O/P EST LOW 20 MIN: CPT | Mod: S$PBB,,, | Performed by: NURSE PRACTITIONER

## 2017-06-09 PROCEDURE — 99213 OFFICE O/P EST LOW 20 MIN: CPT | Mod: PBBFAC,PO | Performed by: NURSE PRACTITIONER

## 2017-06-09 NOTE — PROGRESS NOTES
"Subjective:       Patient ID: Carlos Coulter is a 26 y.o. female.    Chief Complaint: Hearing Loss    Otalgia    Associated symptoms include hearing loss.   Ear Drainage    Associated symptoms include hearing loss.   Hearing Loss:    Associated symptoms: ear pain.       Patient returns today for otalgia. She has been seen here in ENT multiple times for referred otalgia, usually about three times per year. Each time, no otogenic cause can be found; clear aural exam with type "A" tympanometry. We have discussed that tympanometry is the "gold standard" for determining presence or absence of middle ear fluid and that no middle ear fluid = no OM. Patient has been encouraged to see a dentist ASAP on multiple occasions for advanced gingivitis and numerous dental caries as they are most likely relate to her referred otalgia.  She states she has not yet seen a dentist due to lack of insurance and none of the dentists in her area take her insurance.   She was recently treated for otalgia with oral and ototopical antibiotics yet ear pain persists.     Review of Systems   Constitutional: Negative.    HENT: Positive for ear pain and hearing loss.    Eyes: Negative.    Respiratory: Negative.    Cardiovascular: Negative.    Gastrointestinal: Negative for nausea.   Musculoskeletal: Negative.    Skin: Negative.    Neurological: Negative.    Psychiatric/Behavioral: Negative.        Objective:      Physical Exam   Constitutional: She is oriented to person, place, and time. Vital signs are normal. She appears well-developed and well-nourished. She is cooperative. She does not appear ill. No distress.   HENT:   Head: Normocephalic and atraumatic.   Right Ear: Hearing, tympanic membrane, external ear and ear canal normal. No drainage or swelling. Tympanic membrane is not perforated and not erythematous. Tympanic membrane mobility is normal. No middle ear effusion.   Left Ear: Hearing, tympanic membrane, external ear and ear " "canal normal. No drainage or swelling. Tympanic membrane is not perforated and not erythematous. Tympanic membrane mobility is normal.  No middle ear effusion.   Nose: Nose normal. No mucosal edema, rhinorrhea or septal deviation. Right sinus exhibits no maxillary sinus tenderness and no frontal sinus tenderness. Left sinus exhibits no maxillary sinus tenderness and no frontal sinus tenderness.   Mouth/Throat: Uvula is midline, oropharynx is clear and moist and mucous membranes are normal. Mucous membranes are not pale, not dry and not cyanotic. Abnormal dentition. Dental caries (poor hygiene, severe gingivitis, numerous caries) present. No oropharyngeal exudate, posterior oropharyngeal edema or posterior oropharyngeal erythema.     Type "A" tympanogram consistent with well-pneumatized mesotympanum and absence of middle ear effusions AU.     Eyes: Conjunctivae, EOM and lids are normal. Pupils are equal, round, and reactive to light. Right eye exhibits no discharge. Left eye exhibits no discharge. No scleral icterus.   Neck: Trachea normal and normal range of motion. Neck supple. No tracheal deviation present. No thyroid mass and no thyromegaly present.   Cardiovascular: Normal rate.    Pulmonary/Chest: Effort normal. No stridor. No respiratory distress. She has no wheezes.   Musculoskeletal: Normal range of motion.   Lymphadenopathy:        Head (right side): No submental, no submandibular, no tonsillar, no preauricular and no posterior auricular adenopathy present.        Head (left side): No submental, no submandibular, no tonsillar, no preauricular and no posterior auricular adenopathy present.     She has no cervical adenopathy.        Right cervical: No superficial cervical and no posterior cervical adenopathy present.       Left cervical: No superficial cervical and no posterior cervical adenopathy present.   Neurological: She is alert and oriented to person, place, and time. She has normal strength. " Coordination and gait normal.   Skin: Skin is warm, dry and intact. No lesion and no rash noted. She is not diaphoretic. No cyanosis. No pallor.   Psychiatric: She has a normal mood and affect. Her speech is normal and behavior is normal. Judgment and thought content normal. Cognition and memory are normal.   Nursing note and vitals reviewed.    Assessment:     Referred otalgia, not otogenic, most likely odontogenic or musculoskeletal etiology  Hearing is WNL AU w/excellent speech discrimination AU, stable    Advanced gingivitis and dental disease, numerous dental caries  Plan:     Reassured patient both ears normal without effusion or inflammation. No evidence of OM or OE.      See dentist ASAP

## 2017-06-09 NOTE — LETTER
June 9, 2017      Izzy Toney NP  [id number] [address] [address]           [facility]  [address] [facility] [address] [place] [address]  Phone: [phone]  Fax: [phone]          Patient: Carlos Coulter   MR Number: [id number]   YOB: 1990   Date of Visit: 6/9/2017       Dear Izzy Toney:    Thank you for referring Carlos Coulter to me for evaluation. Attached you will find relevant portions of my assessment and plan of care.    If you have questions, please do not hesitate to call me. I look forward to following Carlos Coulter along with you.    Sincerely,    Earlene Sanchez NP    Enclosure  CC:  No Recipients    If you would like to receive this communication electronically, please contact [email or web address] or [phone] to request more information on [employer] access.    For providers and/or their staff who would like to refer a patient to [facility], please contact us through our one-stop-shop provider referral line, [facility], at [phone].    If you feel you have received this communication in error or would no longer like to receive these types of communications, please e-mail [email or web address]

## 2017-06-13 ENCOUNTER — CLINICAL SUPPORT (OUTPATIENT)
Dept: REHABILITATION | Facility: HOSPITAL | Age: 27
End: 2017-06-13
Attending: ORTHOPAEDIC SURGERY
Payer: MEDICAID

## 2017-06-13 DIAGNOSIS — M25.561 ACUTE PAIN OF RIGHT KNEE: Primary | ICD-10-CM

## 2017-06-13 PROCEDURE — 97110 THERAPEUTIC EXERCISES: CPT | Mod: PN

## 2017-06-13 NOTE — PROGRESS NOTES
"Name: Carlos Coulter  [facility] Number: [id number]  Date of Treatment: 06/13/2017   Diagnosis:   Encounter Diagnosis   Name Primary?    Acute pain of right knee Yes       Time in: 3:00   Time Out: 4:07  Total Treatment Time: 55  Group Time: 30      Subjective:    Carlos reported increased soreness in the R quad. States she has not been keeping up with her home exercises. Patient reports their pain and stiffness to be 2/10 on a 0-10 scale with 0 being no pain and 10 being the worst pain imaginable.    Objective    Patient received individual therapy to increase strength, endurance, ROM, flexibility, posture and core stabilization with 0 patients with activities as follows:     Carlos received therapeutic exercises to develop strength, endurance, ROM, flexibility, posture and core stabilization for 55 minutes including:     -EFX: BWD x 5 minutes with emphasis on Right quad and decreased R hip elevation  -prone knee hangs x 10 minutes, 5#   -supine: with OP for right knee extension noted.  -standing: gastroc stretch x2min on slant board level 3     -ambulatory tasks: using the orange/grey floor x 2 with emphasis on quad  -standing at EOT: TKE with BTB 5" hold x 3 min for knee extension purposes  - TKE with BTB and lateral weight shift onto R LE (vc for prevention of L hip drop) 10 x 10"  - ball squats on wall with orange sball (vc for hip disassociation) 2x10  -forward step up on 4" step 2x10   -standing: bilateral knee extension with DF 10 x 10"  -quad sets: 1/2 roll under ankle and towel roll under knee, 20 x 10"    Exercises not performed this date:  -SLR (R) 3/10  -S/L: hip abduction/adduction 3/10 (vc for technique)   -patella mobility(4-way)   -shuttle: bilateral Eccentric (2/1) 2B 2/10, (R) single leg knee extension with end-range DF 3/10 (2 B) (NP)     PROM: right knee extension over half foam roll with over-pressure x 3 to improve hyper-extension compared to left knee (gait purposes)       Gait: " Patient ambulated modified independently without brace x 100 ft.(continues)    Gait deviations: decreased abdi, decreased step length to RLE with limited knee extension.     Written Home Exercises Provided: Continuation of current written HEP.   Pt demo good understanding of the education provided. Carlos demonstrated good and fair return demonstration of activities.     Assessment:   Carlos continues with R quad weakness and compensation of R hip elevation during ambulation and stepping activities. She continues with difficulty of hip dissociation during eccentric knee flexion as well as sitting and standing. Required review of importance for glut activation and quad activation during ambulation.     Pt will continue to benefit from skilled PT intervention. Medical Necessity is demonstrated by:  Unable to participate in daily activities, Continued inability to participate in vocational pursuits, Unable to participate fully in daily activities, Requires skilled supervision to complete and progress HEP and Weakness.    Patient is making good progress towards established goals.    Plan:  Continue with established Plan of Care towards PT goals.

## 2017-06-15 ENCOUNTER — CLINICAL SUPPORT (OUTPATIENT)
Dept: REHABILITATION | Facility: HOSPITAL | Age: 27
End: 2017-06-15
Attending: ORTHOPAEDIC SURGERY
Payer: MEDICAID

## 2017-06-15 DIAGNOSIS — Z98.890 S/P ACL RECONSTRUCTION: ICD-10-CM

## 2017-06-15 DIAGNOSIS — Z98.890 S/P RIGHT KNEE ARTHROSCOPY: ICD-10-CM

## 2017-06-15 PROCEDURE — 97110 THERAPEUTIC EXERCISES: CPT | Mod: PN | Performed by: PHYSICAL THERAPIST

## 2017-06-15 NOTE — PROGRESS NOTES
"Name: Carlos Coulter  [facility] Number: [id number]  Date of Treatment: 06/15/2017   Diagnosis:   Encounter Diagnoses   Name Primary?    S/P ACL reconstruction     S/P right knee arthroscopy        Time in: 1000  Time Out: 1100  Total Treatment Time: 60  Group Time: 0      Subjective:    Carlos reported doing some exercises and has been walking more. Patient reported not doing quad sets/SLR.  Patient reports their pain and stiffness to be 2/10 on a 0-10 scale with 0 being no pain and 10 being the worst pain imaginable.    Objective    Patient received individual therapy to increase strength, endurance, ROM, flexibility, posture and core stabilization with 0 patients with activities as follows:     Carlos received therapeutic exercises to develop strength, endurance, ROM, flexibility, posture and core stabilization for 55 minutes including: (30 minutes one on one)     -EFX: BWD 5 minutes with emphasis on Right quad  -standing: gastroc stretch 3/30" on slant board level 3  -wall leans: mini squat with emphasis on RLE, left foot on 6" step 5  X 10"     - ambulatory tasks: using the orange/grey floor x 2 with emphasis on quad    -standing: bilateral knee extension with DF 10 x 10"    -quad sets 5 x 30" with strap, towel under ankle  -SLR (R) 3/10  -S/L: hip abduction/adduction 3/10 (vc for technique)     -patella mobility(4-way)     -standing at EOT: TKE with GTB 20 x 5" for knee extension purposes  -forward step up on 4" step 2x10     -shuttle: bilateral  Eccentric (2/1) 2B 2/10, (R) single leg knee extension with end-range DF 3/10 (2 B)     -prone knee hangs x 15 minutes, 5#   -supine: with OP for right knee extension noted.    PROM: right knee extension over half foam roll with over-pressure x 3 to improve hyper-extension compared to left knee (gait purposes)    -S/L: right hip flexor stretch manual 3 x for 1 minute       Written Home Exercises Provided: Continuation of current written HEP.   Pt demo good " understanding of the education provided. Carlos demonstrated good and fair return demonstration of activities.     Assessment:     A/PROM: right knee  Flexion 110/120 degrees  Extension -5/+2 degrees after prone knee hangs, manual overpressure  - improvement in active right knee flexion noted:   -Cueing on posture.  -Extension lag noted with gait  Right quad strength is improving slowly but still has weakness with functional tasks.    Pt will continue to benefit from skilled PT intervention. Medical Necessity is demonstrated by:  Unable to participate in daily activities, Continued inability to participate in vocational pursuits, Unable to participate fully in daily activities, Requires skilled supervision to complete and progress HEP and Weakness.    Patient is making good progress towards established goals.    Plan:  Continue with established Plan of Care towards PT goals.

## 2017-06-20 ENCOUNTER — CLINICAL SUPPORT (OUTPATIENT)
Dept: REHABILITATION | Facility: HOSPITAL | Age: 27
End: 2017-06-20
Attending: ORTHOPAEDIC SURGERY
Payer: MEDICAID

## 2017-06-20 DIAGNOSIS — M25.561 ACUTE PAIN OF RIGHT KNEE: Primary | ICD-10-CM

## 2017-06-20 PROCEDURE — 97110 THERAPEUTIC EXERCISES: CPT | Mod: PN | Performed by: PHYSICAL THERAPIST

## 2017-06-20 NOTE — PLAN OF CARE
"TIME RECORD    Date: 06/20/2017    Start Time:  1400  Stop Time:  1500    PHYSICAL THERAPY UPDATED PLAN OF TREATMENT    Patient name: Carlos Coulter  Onset Date: 03/09/2017   SOC Date:  03/10/2017  Primary Diagnosis:    1. Acute pain of right knee          S/P ACL reconstruction, 3/9/2017      Status post lateral meniscus repair      Status post medial meniscus repair        Treatment Diagnosis:  Impaired functional mobility with right knee pain, gait abnormality  Precautions:    Continue with therapy per protocol  Visits from SOC: 28     Functional Level Prior to SOC:  Per previous note.     1. Right knee anterior cruciate ligament reconstruction with ipsilateral   hamstring autograft AND gracilis allograft.   2. Right knee arthroscopic medial and lateral meniscus repair with lateral meniscus root repair  3. Right knee arthroscopic chondroplasty     Updated Assessment:       S: Patient reported doing some exercise but has not been consistent.  When asked on performing prone knee hangs. Patient replied not doing it at home.     O: TE to address LE strength, core, knee mobility, gait for ADL purposes. (30 minutes one on one)     -EFX: 10 minutes BWD with emphasis on quad  -standing gastroc stretch 3/30"  -6" stair step ups 2/10    -quad sets 5 x 30" with strap, towel under ankle   -SLR (R) 3/10  -S/L: hip abduction/adduction 3/10   -patella mobility(4-way)    -T-ball warm up with knee flexion x 20  -prone hip extension 3/10 (R)     -standing at EOT: knee extension purposes     -shuttle: bilateral  Eccentric (2/1) 2B 2/10, (R) single leg knee extension with end-range DF 3/10 (1B)     -prone knee hangs x 15 minutes, 5# , f/b manual over pressure    PROM: right knee extension over half foam roll with over-pressure x 3 to improve hyper-extension compared to left knee (gait purposes)   A:  -muscle guarding noted.      Gait: ambulating without AD: modified independent     P: Continue with POC     Previous " Short Term Goals Status:   Short Term Goals: 6 weeks  1. Pt will initiate HEP addressing right knee extension to 0 degrees for ambulatory purposes.. Ongoing  2. Pt will present with increased right quad MMT by one half grade for increased ease with functional ADLs. Met  3. Pt will report decreased pain to </= 5/10 Met  4. Pt will improve right knee passive flexion to 90 degrees in pain-free range for mobility purposes. Met  5. Patient able to negotiate 4 steps modified independently without painful limitations.Ongoing     6.Patient will increase hip abductor strength 1/2 grade for ambulatory purposes. Ongoing  7.Patient will ambulate independently 150 ft without extension lag for ADL purposes. Ongoing  8. Patient will demonstrate right knee flexion passively to 120 degrees for mobility purposes. Met passively. Not met for AROM:     Long Term Goal Status:   continue per initial plan of care.  1. Patient will return to community ambulation independently with right knee active motion: 0-120+ degrees with < 3/10 pain.  2. Patient will demonstrate right lower extremity strength 4+ to 5/5 for standing/mobility purposes.  3. Patient will demonstrate right single leg squat from 0-60 degrees for stability/dynamis purposes.  4. Pt will be independent with HEP and self management of symptoms     Reasons for Recertification of Therapy:    Patient S/P: 3/9/2017  1. Right knee anterior cruciate ligament reconstruction with ipsilateral   hamstring autograft AND gracilis allograft.   2. Right knee arthroscopic medial and lateral meniscus repair with lateral meniscus root repair  3. Right knee arthroscopic chondroplasty     Gait:Patient continues to ambulate modified independently (increase time noted) with gait deviations: decreased abdi, extension lag of RLE.    Decreased neuromuscular control of the LLE, genu valgus noted    - right quad weakness : 4-/5 with MMT    -impaired dynamic balance     A/PROM:right knee  Flexion:  110/120 degrees  Extension: -3 to +2 degrees hyperextension after manual stretch, prone knee hangs for 15 minutes.     Certification Period: 03/10/2017 to 08/24/2017 continues...from previous evaluation  Recommended Treatment Plan: 2-3 times per week for 24 weeks: Electrical Stimulation NMES/TENS, Gait Training, Group Therapy, Manual Therapy, Moist Heat/ Ice, Neuromuscular Re-ed, Patient Education, Self Care, Therapeutic Activites and Therapeutic Exercise  Other Recommendations: Thank you or consult.     Therapist's Name: Michele Trujillo PT, DPT  Date: 06/20/2017     I CERTIFY THE NEED FOR THESE SERVICES FURNISHED UNDER THIS PLAN OF TREATMENT AND WHILE UNDER MY CARE     Physician's comments: ________________________________________________________________________________________________________________________________________________

## 2017-06-22 NOTE — PLAN OF CARE
"TIME RECORD     Date: 06/20/2017     Start Time:  1400  Stop Time:  1500     PHYSICAL THERAPY UPDATED PLAN OF TREATMENT     Patient name: Carlos Coulter  Onset Date: 03/09/2017   SOC Date:  03/10/2017  Primary Diagnosis:    1. Acute pain of right knee          S/P ACL reconstruction, 3/9/2017      Status post lateral meniscus repair      Status post medial meniscus repair        Treatment Diagnosis:  Impaired functional mobility with right knee pain, gait abnormality  Precautions:    Continue with therapy per protocol  Visits from SOC: 28     Functional Level Prior to SOC:  Per previous note.     1. Right knee anterior cruciate ligament reconstruction with ipsilateral   hamstring autograft AND gracilis allograft.   2. Right knee arthroscopic medial and lateral meniscus repair with lateral meniscus root repair  3. Right knee arthroscopic chondroplasty     Updated Assessment:       S: Patient reported doing some exercise but has not been consistent. Pain rating to right knee 4-5/10 described muscle soreness/patella as well with sit to stands, and walking.  When asked on performing prone knee hangs. Patient replied not doing it at home.     O: TE to address LE strength, core, knee mobility, gait for ADL purposes. (30 minutes one on one)     -EFX: 10 minutes BWD with emphasis on quad  -standing gastroc stretch 3/30"  -6" stair step ups 2/10     -quad sets 5 x 30" with strap, towel under ankle   -SLR (R) 3/10  -S/L: hip abduction/adduction 3/10   -patella mobility(4-way)     -T-ball warm up with knee flexion x 20  -prone hip extension 3/10 (R)     -standing at EOT: knee extension purposes     -shuttle: bilateral  Eccentric (2/1) 2B 2/10, (R) single leg knee extension with end-range DF 3/10 (1B)     -prone knee hangs x 15 minutes, 5# , f/b manual over pressure     PROM: right knee extension over half foam roll with over-pressure x 3 to improve hyper-extension compared to left knee (gait " purposes)   A:  -muscle guarding noted.      Gait: ambulating without AD: modified independent with limitations: right knee extension lag, increase time noted.     P: Continue with POC     Previous Short Term Goals Status:   Short Term Goals: 6 weeks  1. Pt will initiate HEP addressing right knee extension to 0 degrees for ambulatory purposes.. Ongoing  2. Pt will present with increased right quad MMT by one half grade for increased ease with functional ADLs. Met  3. Pt will report decreased pain to </= 5/10 Met  4. Pt will improve right knee passive flexion to 90 degrees in pain-free range for mobility purposes. Met  5. Patient able to negotiate 4 steps modified independently without painful limitations.Ongoing     6.Patient will increase hip abductor strength 1/2 grade for ambulatory purposes. Ongoing  7.Patient will ambulate independently 150 ft without extension lag for ADL purposes. Ongoing  8. Patient will demonstrate right knee flexion passively to 120 degrees for mobility purposes. Met passively. Not met for AROM:     Long Term Goal Status:   continue per initial plan of care.  1. Patient will return to community ambulation independently with right knee active motion: 0-120+ degrees with < 3/10 pain.  2. Patient will demonstrate right lower extremity strength 4+ to 5/5 for standing/mobility purposes.  3. Patient will demonstrate right single leg squat from 0-60 degrees for stability/dynamis purposes.  4. Pt will be independent with HEP and self management of symptoms     Reasons for Recertification of Therapy:    Patient S/P: 3/9/2017  1. Right knee anterior cruciate ligament reconstruction with ipsilateral   hamstring autograft AND gracilis allograft.   2. Right knee arthroscopic medial and lateral meniscus repair with lateral meniscus root repair  3. Right knee arthroscopic chondroplasty     Gait:Patient continues to ambulate modified independently (increase time noted) with gait deviations: decreased  abdi, extension lag of RLE.     Decreased neuromuscular control of the LLE, genu valgus noted     - right quad weakness : 4-/5 with MMT     -impaired dynamic balance     A/PROM:right knee  Flexion: 110/120 degrees  Extension: -3 to +2 degrees hyperextension after manual stretch, prone knee hangs for 15 minutes.    Functional Limitations:    Patient noted with impaired functional mobility involving transfers as well as community ambulation. Decreased neuromuscular control of the RLE. Physical limitations noted with ascending/descending stairs with compensations. Circumduction as well as decreased hip/knee flexion with swing phase of gait.     Certification Period: 03/10/2017 to 08/24/2017 continues...from previous evaluation  Recommended Treatment Plan: 2-3 times per week for 24 weeks: Electrical Stimulation NMES/TENS, Gait Training, Group Therapy, Manual Therapy, Moist Heat/ Ice, Neuromuscular Re-ed, Patient Education, Self Care, Therapeutic Activites and Therapeutic Exercise  Other Recommendations: Thank you or consult.     Therapist's Name: Michele Trujillo PT, DPT  Date: 06/20/2017     I CERTIFY THE NEED FOR THESE SERVICES FURNISHED UNDER THIS PLAN OF TREATMENT AND WHILE UNDER MY CARE     Physician's comments: ________________________________________________________________________________________________________________________________________________

## 2017-06-23 ENCOUNTER — OFFICE VISIT (OUTPATIENT)
Dept: OBSTETRICS AND GYNECOLOGY | Facility: CLINIC | Age: 27
End: 2017-06-23
Payer: MEDICAID

## 2017-06-23 VITALS
HEIGHT: 60 IN | BODY MASS INDEX: 33.58 KG/M2 | WEIGHT: 171.06 LBS | DIASTOLIC BLOOD PRESSURE: 78 MMHG | SYSTOLIC BLOOD PRESSURE: 124 MMHG

## 2017-06-23 DIAGNOSIS — Z01.419 ROUTINE GYNECOLOGICAL EXAMINATION: Primary | ICD-10-CM

## 2017-06-23 PROCEDURE — 99213 OFFICE O/P EST LOW 20 MIN: CPT | Mod: PBBFAC,PN | Performed by: OBSTETRICS & GYNECOLOGY

## 2017-06-23 PROCEDURE — 99395 PREV VISIT EST AGE 18-39: CPT | Mod: S$PBB,,, | Performed by: OBSTETRICS & GYNECOLOGY

## 2017-06-23 PROCEDURE — 88175 CYTOPATH C/V AUTO FLUID REDO: CPT

## 2017-06-23 PROCEDURE — 99999 PR PBB SHADOW E&M-EST. PATIENT-LVL III: CPT | Mod: PBBFAC,,, | Performed by: OBSTETRICS & GYNECOLOGY

## 2017-07-06 ENCOUNTER — LAB VISIT (OUTPATIENT)
Dept: LAB | Facility: HOSPITAL | Age: 27
End: 2017-07-06
Attending: FAMILY MEDICINE
Payer: MEDICAID

## 2017-07-06 ENCOUNTER — TELEPHONE (OUTPATIENT)
Dept: FAMILY MEDICINE | Facility: CLINIC | Age: 27
End: 2017-07-06

## 2017-07-06 DIAGNOSIS — E03.4 HYPOTHYROIDISM DUE TO ACQUIRED ATROPHY OF THYROID: ICD-10-CM

## 2017-07-06 LAB
T4 FREE SERPL-MCNC: 1.25 NG/DL
TSH SERPL DL<=0.005 MIU/L-ACNC: 0.13 UIU/ML

## 2017-07-06 PROCEDURE — 84439 ASSAY OF FREE THYROXINE: CPT

## 2017-07-06 PROCEDURE — 84443 ASSAY THYROID STIM HORMONE: CPT

## 2017-07-06 PROCEDURE — 36415 COLL VENOUS BLD VENIPUNCTURE: CPT | Mod: PO

## 2017-07-06 NOTE — TELEPHONE ENCOUNTER
Spoke to pt and she stated she been having a bad sore throat for 2 days, getting worse and feels like she has fever.   Informed pt no available in [facility] and first avail is in [facility] Monday.  As instructed by upper management, advised pt to go to urgent care.      Medicaid

## 2017-07-06 NOTE — TELEPHONE ENCOUNTER
----- Message from Isabelle Ruiz sent at 7/6/2017  7:04 AM CDT -----  Contact: [phone]  Patient has a sore throat,requesting to be seen today. Not able to schedule appointment with the doctor for available appointment today. Thanks!

## 2017-07-08 ENCOUNTER — TELEPHONE (OUTPATIENT)
Dept: FAMILY MEDICINE | Facility: CLINIC | Age: 27
End: 2017-07-08

## 2017-07-08 DIAGNOSIS — E03.4 HYPOTHYROIDISM DUE TO ACQUIRED ATROPHY OF THYROID: Primary | ICD-10-CM

## 2017-07-08 RX ORDER — LEVOTHYROXINE SODIUM 88 UG/1
88 TABLET ORAL DAILY
Qty: 30 TABLET | Refills: 1 | Status: SHIPPED | OUTPATIENT
Start: 2017-07-08 | End: 2017-09-04 | Stop reason: SDUPTHER

## 2017-07-08 NOTE — TELEPHONE ENCOUNTER
Pls notify pt:    Thyroid levels are still off indicating a tad too high of a dose  Let's decrease synthroid to 88 mcg and recheck labs in 6 wks

## 2017-07-11 ENCOUNTER — CLINICAL SUPPORT (OUTPATIENT)
Dept: REHABILITATION | Facility: HOSPITAL | Age: 27
End: 2017-07-11
Attending: ORTHOPAEDIC SURGERY
Payer: MEDICAID

## 2017-07-11 DIAGNOSIS — M25.561 ACUTE PAIN OF RIGHT KNEE: Primary | ICD-10-CM

## 2017-07-11 PROCEDURE — 97110 THERAPEUTIC EXERCISES: CPT | Mod: PN | Performed by: PHYSICAL THERAPIST

## 2017-07-11 PROCEDURE — G8979 MOBILITY GOAL STATUS: HCPCS | Mod: CK,PN | Performed by: PHYSICAL THERAPIST

## 2017-07-11 PROCEDURE — G8978 MOBILITY CURRENT STATUS: HCPCS | Mod: CJ,PN | Performed by: PHYSICAL THERAPIST

## 2017-07-11 NOTE — PROGRESS NOTES
"Name: Carlos Coulter  [facility] Number: [id number]  Date of Treatment: 07/11/2017   Diagnosis:   Encounter Diagnosis   Name Primary?    Acute pain of right knee Yes   Primary Diagnosis:    1. Acute pain of right knee          S/P ACL reconstruction, 3/9/2017      Status post lateral meniscus repair      Status post medial meniscus repair        Time in: 1300  Time Out: 1400  Total Treatment Time: 55  Group Time: 0      Subjective:    Carlos reported having no pain and has not been performing the HEP. Patient has missed over two weeks of therapy due to transportation and family matters.  Patient reports their pain and stiffness to be 2/10 on a 0-10 scale with 0 being no pain and 10 being the worst pain imaginable.    Objective    Patient received individual therapy to increase strength, endurance, ROM, flexibility, posture and core stabilization with 0 patients with activities as follows:     Carlos received therapeutic exercises to develop strength, endurance, ROM, flexibility, posture and core stabilization for 55 minutes including:     -EFX: BWD 6 minutes with emphasis on Right quad  -standing: gastroc stretch 2 x 1" on slant board level 3  -seated: HS stretch 3/30" (R)     -6" stair step ups 2/10     -quad sets 5 x 30" with strap, towel under ankle   -SLR (R) 3/10  -S/L: hip abduction/adduction 3/10 , S/L: using orange ball hip abduction/extension 3/10  -patella mobility(4-way)     -T-ball warm up with knee flexion x 20  -prone hip extension 3/10 (R)     -shuttle: bilateral  Eccentric (2/1) 3B 2/10, (R) single leg knee extension with end-range DF 3/10 (2.5B)  -seated: 90/40 knee extension 3/10 20#     -prone knee hangs x 15 minutes, 5# , f/b manual over pressure     PROM: right knee extension over half foam roll with over-pressure x 3 to improve hyper-extension compared to left knee (gait purposes)    Written Home Exercises Provided: Continuation of current written HEP. Provided today with prone knee " hangs, SLR, hip abduction/adduction since she reported unable to find it.  Pt demo good understanding of the education provided. Carlos demonstrated good and fair return demonstration of activities.     Assessment:   CMS Impairment/Limitation/Restriction for FOTO Knee Survey  Status Limitation G-Code CMS Severity Modifier  Intake 19% 82%  Predicted 49% 52% Goal Status+ CK - At least 40 percent but less than 60 percent  4/20/2017 43% 57%  5/16/2017 61% 39%  7/11/2017 62% 38% Current Status CJ - At least 20 percent but less than 40 percent    A/PROM:right knee  Flexion: 109/120 degrees  Extension: -4 to +2 degrees hyperextension after manual stretch, prone knee hangs for 10 minutes.    - right quad weakness : 4-/5 with MMT  - improvement in active right knee quad contraction but weakness still exists  -Cueing on posture.  -Extension lag noted with gait improving slowly  -gait abnormality    Pt will continue to benefit from skilled PT intervention. Medical Necessity is demonstrated by:  Unable to participate in daily activities, Continued inability to participate in vocational pursuits, Unable to participate fully in daily activities, Requires skilled supervision to complete and progress HEP and Weakness.    Patient is making Fair-good progress towards established goals.    Previous Short Term Goals Status:   Short Term Goals: 6 weeks  1. Pt will initiate HEP addressing right knee extension to 0 degrees for ambulatory purposes.. Ongoing  2. Pt will present with increased right quad MMT by one half grade for increased ease with functional ADLs. Met  3. Pt will report decreased pain to </= 5/10 Met  4. Pt will improve right knee passive flexion to 90 degrees in pain-free range for mobility purposes. Met  5. Patient able to negotiate 4 steps modified independently without painful limitations.Ongoing     6.Patient will increase hip abductor strength 1/2 grade for ambulatory purposes. Ongoing  7.Patient will ambulate  independently 150 ft without extension lag for ADL purposes. Ongoing  8. Patient will demonstrate right knee flexion passively to 120 degrees for mobility purposes. Met passively. Not met for AROM:      Plan:  Continue with established Plan of Care towards PT goals.

## 2017-07-13 ENCOUNTER — CLINICAL SUPPORT (OUTPATIENT)
Dept: REHABILITATION | Facility: HOSPITAL | Age: 27
End: 2017-07-13
Attending: ORTHOPAEDIC SURGERY
Payer: MEDICAID

## 2017-07-13 DIAGNOSIS — M25.561 ACUTE PAIN OF RIGHT KNEE: Primary | ICD-10-CM

## 2017-07-13 PROCEDURE — 97110 THERAPEUTIC EXERCISES: CPT | Mod: PN | Performed by: PHYSICAL THERAPIST

## 2017-07-13 RX ORDER — AZITHROMYCIN 250 MG/1
TABLET, FILM COATED ORAL
Qty: 4 TABLET | Refills: 0 | OUTPATIENT
Start: 2017-07-13

## 2017-07-13 NOTE — PROGRESS NOTES
"Name: Carlos Coulter  [facility] Number: [id number]  Date of Treatment: 07/13/2017   Diagnosis:   Encounter Diagnosis   Name Primary?    Acute pain of right knee Yes   Primary Diagnosis:    1. Acute pain of right knee          S/P ACL reconstruction, 3/9/2017      Status post lateral meniscus repair      Status post medial meniscus repair        Time in: 1310  Time Out: 1400  Total Treatment Time: 50  Group Time: 0      Subjective:    Carlos reported having no pain and performed her HEP one time yesterday. Patient has missed over two weeks of therapy due to transportation and family matters.  Patient reports their pain and stiffness to be 2/10 on a 0-10 scale with 0 being no pain and 10 being the worst pain imaginable.    Objective    Patient received individual therapy to increase strength, endurance, ROM, flexibility, posture and core stabilization with 0 patients with activities as follows:     Carlos received therapeutic exercises to develop strength, endurance, ROM, flexibility, posture and core stabilization for 55 minutes including:(30 minutes one on one)     -EFX: BWD 6 minutes with emphasis on Right quad   -standing: gastroc stretch 2 x 1" on slant board level 3   -seated: HS stretch 3/30" (R)     -6" stair step ups 2/10     -quad sets 5 x 30" with strap, towel under ankle.  -SLR (R) 3/10  -S/L: hip abduction/adduction 3/10, prone hip extension 3/10  S/L: using orange ball hip abduction/extension 3/10  -patella mobility(4-way)     -T-ball warm up with knee flexion x 30  -prone hip extension 3/10 (R)  -standing: TKE 3/10 with YTB     -shuttle: bilateral  Eccentric (2/1) 3B 2/10, (R) single leg knee extension with end-range DF 3/10 (2.5B)  -seated: 90/40 knee extension 3/10 20#     -prone knee hangs x 15 minutes, 5# , f/b manual over pressure     PROM: right knee extension over half foam roll with over-pressure x 3 to improve hyper-extension compared to left knee (gait purposes)    Written Home " Exercises Provided: Continuation of current written HEP. Provided today with prone knee hangs, SLR, hip abduction/adduction since she reported unable to find it.  Pt demo good understanding of the education provided. Carlos demonstrated good and fair return demonstration of activities.     Assessment:   A/PROM:right knee  Flexion: 110/120 degrees. Minimal muscle guarding  Extension: -1 to +2 degrees hyperextension noted  - improvement in active right knee quad contraction  -Cueing on posture.  -gait abnormality with minimal extension lag noted    Pt will continue to benefit from skilled PT intervention. Medical Necessity is demonstrated by:  Unable to participate in daily activities, Continued inability to participate in vocational pursuits, Unable to participate fully in daily activities, Requires skilled supervision to complete and progress HEP and Weakness.    Patient is making Fair-good progress towards established goals.    Previous Short Term Goals Status:   Short Term Goals: 6 weeks  1. Pt will initiate HEP addressing right knee extension to 0 degrees for ambulatory purposes.. Ongoing  2. Pt will present with increased right quad MMT by one half grade for increased ease with functional ADLs. Met  3. Pt will report decreased pain to </= 5/10 Met  4. Pt will improve right knee passive flexion to 90 degrees in pain-free range for mobility purposes. Met  5. Patient able to negotiate 4 steps modified independently without painful limitations.Ongoing     6.Patient will increase hip abductor strength 1/2 grade for ambulatory purposes. Ongoing  7.Patient will ambulate independently 150 ft without extension lag for ADL purposes. Ongoing  8. Patient will demonstrate right knee flexion passively to 120 degrees for mobility purposes. Met passively. Not met for AROM:      Plan:  Continue with established Plan of Care towards PT goals.

## 2017-07-18 ENCOUNTER — CLINICAL SUPPORT (OUTPATIENT)
Dept: REHABILITATION | Facility: HOSPITAL | Age: 27
End: 2017-07-18
Attending: ORTHOPAEDIC SURGERY
Payer: MEDICAID

## 2017-07-18 DIAGNOSIS — M25.561 ACUTE PAIN OF RIGHT KNEE: Primary | ICD-10-CM

## 2017-07-18 PROCEDURE — 97110 THERAPEUTIC EXERCISES: CPT | Mod: PN

## 2017-07-18 NOTE — PROGRESS NOTES
"Name: Carlos Coulter  [facility] Number: [id number]  Date of Treatment: 07/18/2017   Diagnosis:   Encounter Diagnosis   Name Primary?    Acute pain of right knee Yes   Primary Diagnosis:    1. Acute pain of right knee          S/P ACL reconstruction, 3/9/2017      Status post lateral meniscus repair      Status post medial meniscus repair        Time in: 2:58  Time Out: 4:03  Total Treatment Time: 60  Group Time: 0      Subjective:    Carlos reports being more compliant with home exercises and the knee is feeling better and stronger. Most discomfort with weather changes of too much activity. Patient reports their pain and stiffness to be 0/10 on a 0-10 scale with 0 being no pain and 10 being the worst pain imaginable.    Objective    Patient received individual therapy to increase strength, endurance, ROM, flexibility, posture and core stabilization with 0 patients with activities as follows:     Carlos received therapeutic exercises to develop strength, endurance, ROM, flexibility, posture and core stabilization for 55 minutes including:     -EFX: BWD 6 minutes with emphasis on Right quad   -standing: gastroc stretch 2 x 1" on slant board level 3   -seated: HS stretch 3/30" (R)     -6" stair step ups 3/10     -quad sets 5 x 30" with strap, towel under ankle.  -SLR (R) 3/10  -S/L: hip abduction/adduction 3/10, prone hip extension 3/10  Standing: using soccer ball hip abduction/extension 3/10  -patella mobility(4-way)     -T-ball warm up with knee flexion in supine x 30  -prone hip extension 3/10 (R)  -standing: TKE 3/10 with YTB     -shuttle: bilateral  Eccentric (2/1) 3B 2/10, (R) single leg knee extension with end-range DF 3/10 (2.5B)  -seated: 90/40 knee extension 3/10 20#     -prone knee hangs x 15 minutes, 5# , f/b manual over pressure (NP)     PROM: right knee extension over half foam roll with over-pressure x 3 to improve hyper-extension compared to left knee (gait purposes)    Written Home " Exercises Provided: Continuation of current written HEP. Provided today with prone knee hangs, SLR, hip abduction/adduction since she reported unable to find it.  Pt demo good understanding of the education provided. Carlos demonstrated good and fair return demonstration of activities.     Assessment:   A/PROM:right knee  Flexion: 112/120 degrees. Minimal muscle guarding/hip hiking  Extension: -1 to +2 degrees hyperextension noted  Carlos tolerated treatment well this date. She presents with mild improvement of R quad activation during ambulation and all exercises. She required mild verbal cues with exercises for reduced R hip guarding/elevation to improve posture and technique. Continued slight extensor lag during ambulation yet is slowly improving. Pt will benefit from continued strengthening as tolerated.    Pt will continue to benefit from skilled PT intervention. Medical Necessity is demonstrated by:  Unable to participate in daily activities, Continued inability to participate in vocational pursuits, Unable to participate fully in daily activities, Requires skilled supervision to complete and progress HEP and Weakness.    Patient is making Fair-good progress towards established goals.    Previous Short Term Goals Status:   Short Term Goals: 6 weeks  1. Pt will initiate HEP addressing right knee extension to 0 degrees for ambulatory purposes.. Ongoing  2. Pt will present with increased right quad MMT by one half grade for increased ease with functional ADLs. Met  3. Pt will report decreased pain to </= 5/10 Met  4. Pt will improve right knee passive flexion to 90 degrees in pain-free range for mobility purposes. Met  5. Patient able to negotiate 4 steps modified independently without painful limitations.Ongoing     6.Patient will increase hip abductor strength 1/2 grade for ambulatory purposes. Ongoing  7.Patient will ambulate independently 150 ft without extension lag for ADL purposes. Ongoing  8. Patient  will demonstrate right knee flexion passively to 120 degrees for mobility purposes. Met passively. Not met for AROM:      Plan:  Continue with established Plan of Care towards PT goals.

## 2017-07-19 ENCOUNTER — CLINICAL SUPPORT (OUTPATIENT)
Dept: REHABILITATION | Facility: HOSPITAL | Age: 27
End: 2017-07-19
Attending: ORTHOPAEDIC SURGERY
Payer: MEDICAID

## 2017-07-19 DIAGNOSIS — M25.561 ACUTE PAIN OF RIGHT KNEE: Primary | ICD-10-CM

## 2017-07-19 PROCEDURE — 97110 THERAPEUTIC EXERCISES: CPT | Mod: PN

## 2017-07-19 NOTE — PROGRESS NOTES
"Name: Carlos Coulter  [facility] Number: [id number]  Date of Treatment: 07/19/2017   Diagnosis:   Encounter Diagnosis   Name Primary?    Acute pain of right knee Yes   Primary Diagnosis:    1. Acute pain of right knee          S/P ACL reconstruction, 3/9/2017      Status post lateral meniscus repair      Status post medial meniscus repair        Time in: 2:57  Time Out: 4:00  Total Treatment Time: 60  Group Time: 0      Subjective:    Carlos reports being more compliant with home exercises and the knee is feeling better and stronger. Most discomfort with weather changes of too much activity. Patient reports their pain and stiffness to be 0/10 on a 0-10 scale with 0 being no pain and 10 being the worst pain imaginable.    Objective    Patient received individual therapy to increase strength, endurance, ROM, flexibility, posture and core stabilization with 0 patients with activities as follows:     Carlos received therapeutic exercises to develop strength, endurance, ROM, flexibility, posture and core stabilization for 55 minutes including:     -EFX: BWD 6 minutes with emphasis on Right quad   -standing: gastroc stretch 2 x 1" on slant board level 3   -seated: HS stretch 3/30" (R)     -6" stair step ups 3/10     -quad sets 5 x 30" with strap, towel under ankle.  -SLR (R) 3/10  -S/L: hip abduction/adduction 3/10, prone hip extension 3/10  S/L using orange ball hip abduction/extension 3/10 (NP)  -patella mobility(4-way)     -T-ball warm up with knee flexion in supine x 30  -prone hip extension 3/10 (R)  -standing: TKE 3/10 with BTB     -shuttle: bilateral  Eccentric (2/1) 3.5B 2/10, (R) single leg knee extension with end-range DF 3/10 (2.5B)  -seated: 90/40 knee extension 3/10 20#  -Prone quad stretch with towel roll under distal thigh for increased hip flexor stretch x60"  -prone knee hangs x 15 minutes, 5# , f/b manual over pressure (NP)     PROM: right knee extension over half foam roll with over-pressure " x 3 to improve hyper-extension compared to left knee (gait purposes)    Written Home Exercises Provided: Continuation of current written HEP. Provided today with prone knee hangs, SLR, hip abduction/adduction since she reported unable to find it.  Pt demo good understanding of the education provided. Carlos demonstrated good and fair return demonstration of activities.     Assessment:   Carlos demonstrated overall good tolerance too treatment this date, mild increase of fatigue due to having PT yesterday. She continues with hip flexor tightness, mild guarding and compensation with R hip hiking, yet R knee ROM and strength are slowly improving. R quad strength and activation have improved slightly. Continued slight extensor lag during ambulation. Pt will benefit from continued strengthening as tolerated.    Pt will continue to benefit from skilled PT intervention. Medical Necessity is demonstrated by:  Unable to participate in daily activities, Continued inability to participate in vocational pursuits, Unable to participate fully in daily activities, Requires skilled supervision to complete and progress HEP and Weakness.    Patient is making Fair-good progress towards established goals.    Previous Short Term Goals Status:   Short Term Goals: 6 weeks  1. Pt will initiate HEP addressing right knee extension to 0 degrees for ambulatory purposes.. Ongoing  2. Pt will present with increased right quad MMT by one half grade for increased ease with functional ADLs. Met  3. Pt will report decreased pain to </= 5/10 Met  4. Pt will improve right knee passive flexion to 90 degrees in pain-free range for mobility purposes. Met  5. Patient able to negotiate 4 steps modified independently without painful limitations.Ongoing     6.Patient will increase hip abductor strength 1/2 grade for ambulatory purposes. Ongoing  7.Patient will ambulate independently 150 ft without extension lag for ADL purposes. Ongoing  8. Patient will  demonstrate right knee flexion passively to 120 degrees for mobility purposes. Met passively. Not met for AROM:      Plan:  Continue with established Plan of Care towards PT goals.

## 2017-07-24 ENCOUNTER — TELEPHONE (OUTPATIENT)
Dept: FAMILY MEDICINE | Facility: CLINIC | Age: 27
End: 2017-07-24

## 2017-07-24 DIAGNOSIS — K59.00 CONSTIPATION, UNSPECIFIED CONSTIPATION TYPE: Primary | ICD-10-CM

## 2017-07-24 NOTE — TELEPHONE ENCOUNTER
Pt stated she uses coffee, ran out of her fiber one 2 weeks ago, and has not used any stool softners in a week.  Pt stated she had normal bowel movement this morning, reported painful to go.  Informed pt to start taking the softners more often.  Pt stated when she takes the softner it helps her to go, but does not take the pain away during the BM.

## 2017-07-24 NOTE — TELEPHONE ENCOUNTER
Pt stated she was seen in May for constipation.   She still having hemorrhoid bleeding with constipation.  Still having bowel movements only about every 3 days with painful constipation.  Pt requesting to have colonoscopy order.  Pended orders and assoc dx.

## 2017-07-24 NOTE — TELEPHONE ENCOUNTER
----- Message from Liudmila Velez sent at 7/24/2017  8:03 AM CDT -----  Contact:  call //[phone]    Calling to  Get a  New  Ref   For a  Procedure   With   gastro

## 2017-07-24 NOTE — TELEPHONE ENCOUNTER
Resume increased fiber in diet and stool softener  Previously you meet with Dr Nieto and he suggested a procedure if your sx persisted  I recommend you fu with him to discuss this procedure further.

## 2017-07-25 ENCOUNTER — OFFICE VISIT (OUTPATIENT)
Dept: ORTHOPEDICS | Facility: CLINIC | Age: 27
End: 2017-07-25
Payer: MEDICAID

## 2017-07-25 ENCOUNTER — HOSPITAL ENCOUNTER (OUTPATIENT)
Dept: RADIOLOGY | Facility: HOSPITAL | Age: 27
Discharge: HOME | End: 2017-07-25
Attending: ORTHOPAEDIC SURGERY
Payer: MEDICAID

## 2017-07-25 VITALS
HEART RATE: 67 BPM | HEIGHT: 60 IN | SYSTOLIC BLOOD PRESSURE: 111 MMHG | DIASTOLIC BLOOD PRESSURE: 65 MMHG | WEIGHT: 171.06 LBS | BODY MASS INDEX: 33.58 KG/M2

## 2017-07-25 DIAGNOSIS — Z98.890 STATUS POST MEDIAL MENISCUS REPAIR: ICD-10-CM

## 2017-07-25 DIAGNOSIS — Z98.890 STATUS POST LATERAL MENISCUS REPAIR: ICD-10-CM

## 2017-07-25 DIAGNOSIS — Z98.890 S/P RIGHT KNEE ARTHROSCOPY: Primary | ICD-10-CM

## 2017-07-25 DIAGNOSIS — Z98.890 S/P ACL RECONSTRUCTION: ICD-10-CM

## 2017-07-25 DIAGNOSIS — G89.18 POST-OP PAIN: ICD-10-CM

## 2017-07-25 PROCEDURE — 99213 OFFICE O/P EST LOW 20 MIN: CPT | Mod: PBBFAC,25,PO | Performed by: ORTHOPAEDIC SURGERY

## 2017-07-25 PROCEDURE — 73560 X-RAY EXAM OF KNEE 1 OR 2: CPT | Mod: 26,RT,, | Performed by: RADIOLOGY

## 2017-07-25 PROCEDURE — 99213 OFFICE O/P EST LOW 20 MIN: CPT | Mod: S$PBB,,, | Performed by: ORTHOPAEDIC SURGERY

## 2017-07-25 PROCEDURE — 99999 PR PBB SHADOW E&M-EST. PATIENT-LVL III: CPT | Mod: PBBFAC,,, | Performed by: ORTHOPAEDIC SURGERY

## 2017-07-25 NOTE — PROGRESS NOTES
Subjective:          Chief Complaint: Carlos Coulter is a 26 y.o. female who had concerns including Pain of the Right Knee.    Ms. Coulter is here for f/u after her right knee surgery. She is using her brace as directed. Pain: 0/10. She is progressing with therapy and does not have any issues.    PREOPERATIVE DIAGNOSES:   1. Right knee anterior cruciate ligament tear.   2. Right knee medial meniscus tear.   3. Right knee lateral meniscus tear      POSTOPERATIVE DIAGNOSES:   1. Right knee anterior cruciate ligament tear.   2. Right knee medial meniscus tear.   3. Right knee lateral meniscus tear.      PROCEDURES:   1. Right knee anterior cruciate ligament reconstruction with ipsilateral   hamstring autograft AND gracilis allograft.   2. Right knee arthroscopic medial and lateral meniscus repair with lateral meniscus root repair  3. Right knee arthroscopic chondroplasty      SURGEON: Kalen Kaufman MD      ASSISTANTS: HALEY Mohan      COMPLICATIONS: None.       POSITION: Supine.       ANESTHESIA: General endotracheal plus right lower extremity femoral canal block.       COMPLICATIONS: None.       EBL: 20cc     EXAMINATION UNDER ANESTHESIA: Right knee, full range of motion, grade II B   positive Lachman, 2+ pivot shift. No opening to varus and mild valgus stress.       ARTHROSCOPIC FINDINGS:   1. Complete tearing, anterior cruciate ligament.   2. Medial meniscus posterior horn and body tear  3. Lateral meniscus posterior horn and meniscus root tear  4. Patellar chondromalacia Grade II  5. Medial femoral condyle chondromalacia Grade II      IMPLANTS:  RIDGID LOOP ADJUSTABLE BUTTON (STANDARD)  6 OMNI-SPAN MENISCAL REPAIR SUTURES  BIO-INTRAFIX TIBIAL SHEATH (LARGE)  BIO-INTRAFIX TAPERED SCREW (7-9X30MM)  MSKTF: Gracillis Tendon  6.5x25 mm cancellous screw  17mm spiked washer  #2 Orthocord      Pain   Pertinent negatives include no chills, fever or joint swelling.       Review of Systems    Constitution: Negative for chills and fever.   Musculoskeletal: Positive for muscle weakness and stiffness. Negative for joint pain and joint swelling.   All other systems reviewed and are negative.                  Objective:        General: Carlos is well-developed, well-nourished, appears stated age, in no acute distress, alert and oriented to time, place and person.     General    Vitals reviewed.  Constitutional: She is oriented to person, place, and time. She appears well-developed and well-nourished. No distress.   HENT:   Head: Normocephalic and atraumatic.   Nose: Nose normal.   Eyes: Pupils are equal, round, and reactive to light.   Cardiovascular: Normal rate.    Pulmonary/Chest: Effort normal.   Neurological: She is alert and oriented to person, place, and time.   Psychiatric: She has a normal mood and affect. Her behavior is normal. Judgment and thought content normal.     General Musculoskeletal Exam   Gait: normal       Right Knee Exam     Inspection   Erythema: absent  Scars: present  Swelling: absent  Effusion: effusion  Deformity: deformity  Bruising: absent (  )    Tenderness   The patient is experiencing no tenderness.         Range of Motion   Extension:  0 normal   Flexion:  130 abnormal     Tests   Ligament Examination Lachman: normal (-1 to 2mm) PCL-Posterior Drawer: normal (0 to 2mm)     MCL - Valgus: normal (0 to 2mm)  LCL - Varus: normal  Patella   Patellar Apprehension: negative  Passive Patellar Tilt: neutral  Patellar Tracking: normal  Patellar Glide (quadrants): Lateral - 1   Medial - 2  Q-Angle at 90 degrees: normal    Other   Sensation: normal    Comments:  Incisions healed    Muscle Strength   Right Lower Extremity   Hip Abduction: 5/5   Quadriceps:  5/5   Hamstrin/5     Vascular Exam     Right Pulses  Dorsalis Pedis:      2+  Posterior Tibial:      2+        Edema  Right Lower Leg: absent      Current and previous radiographic studies and results were reviewed with the  patient:   2 views of the right knee demonstrate evidence of prior ACL reconstruction with some cortical irregularity seen at the medial tibial anchor site.  Minimal displacement could not BE excluded.        Assessment:       Encounter Diagnoses   Name Primary?    S/P right knee arthroscopy Yes    S/P ACL reconstruction     Status post lateral meniscus repair     Status post medial meniscus repair     Post-op pain           Plan:         Continue with increase activities  Continue with therapy per protocol  F/U in 3 months or sooner if needed

## 2017-07-26 ENCOUNTER — TELEPHONE (OUTPATIENT)
Dept: FAMILY MEDICINE | Facility: CLINIC | Age: 27
End: 2017-07-26

## 2017-07-26 NOTE — TELEPHONE ENCOUNTER
----- Message from Liudmila Velez sent at 7/25/2017  8:14 AM CDT -----  Contact:  call //[phone]  Calling to  Speak to  The  Nurse  About  Get a  reff   For  Gastro // please call

## 2017-07-29 ENCOUNTER — NURSE TRIAGE (OUTPATIENT)
Dept: ADMINISTRATIVE | Facility: CLINIC | Age: 27
End: 2017-07-29

## 2017-07-29 NOTE — TELEPHONE ENCOUNTER
Reason for Disposition   Caller has NON-URGENT medication question about med that PCP prescribed and triager unable to answer question    Protocols used: ST MEDICATION QUESTION CALL-A-AH    Pt calling in regards to medication prescribed (Pantoprazole 40 mg) is causing her to sleep a lot.  Pt wants to know should she continue to take medication.  Will message MD and Staff.

## 2017-07-30 NOTE — TELEPHONE ENCOUNTER
Pantoprazole should not make her sleepy.  She can stop the medication. Follow up if her stomach starts to hurt again.

## 2017-07-31 NOTE — TELEPHONE ENCOUNTER
Spoke to patient regarding provider message, patient verbalized understanding to stop medication and follow up if stomach issues arise.

## 2017-08-02 ENCOUNTER — CLINICAL SUPPORT (OUTPATIENT)
Dept: REHABILITATION | Facility: HOSPITAL | Age: 27
End: 2017-08-02
Attending: ORTHOPAEDIC SURGERY
Payer: MEDICAID

## 2017-08-02 ENCOUNTER — DOCUMENTATION ONLY (OUTPATIENT)
Dept: REHABILITATION | Facility: HOSPITAL | Age: 27
End: 2017-08-02

## 2017-08-02 RX ORDER — LEVOTHYROXINE SODIUM 100 UG/1
TABLET ORAL
Qty: 30 TABLET | Refills: 0 | OUTPATIENT
Start: 2017-08-02

## 2017-08-02 NOTE — PROGRESS NOTES
"Name: Carlos Coulter  [facility] Number: [id number]  Date of Treatment: 08/02/2017   Diagnosis:    Acute pain of right knee Yes   Primary Diagnosis:    1. Acute pain of right knee          S/P ACL reconstruction, 3/9/2017      Status post lateral meniscus repair      Status post medial meniscus repair          Time in: 1045  Time Out: 1130    Subjective:    Carlos reports doing some exercise but not consistent. Patient and father verbalized understanding on denial letter and will continue with HEP. Patient reported stopping therapy anyway bc of transportation/financial purposes as well. When asked on assistance, patient reported on starting back HEP and will f/u in one month on progress report. Patient reported walking better as well.  Patient reports their pain to be 0/10 on a 0-10 scale with 0 being no pain and 10 being the worst pain imaginable.    Objective    Carlos received therapeutic exercises to develop bilateral LE strength,  Neuromuscular control, gait for ADL purposes.    -EFX: BWD 5 minutes with emphasis on Right quad   -standing: gastroc stretch 2 x 1" on slant board level 3   -seated: HS stretch 3/30" (R)    -quad sets 5 x 30" with strap, towel under ankle.  -SLR (R) 3/10  -S/L: hip abduction/adduction 3/10, prone hip extension 3/10  -patella mobility(4-way)     -T-ball warm up with knee flexion x 30  -prone hip extension 3/10 (R)  -standing: TKE 3/10 with YTB    -chair squats: 1 x 10 with cueing on proper mechanics      -prone knee hangs x 10 minutes, 5# , f/b manual over pressure       Written Home Exercises Provided: Continuation. Re-addressed HEP : quad sets, SLR, hip abd/add/ext 3/10, sit tot stands without AD, CLAM-Shells with band.    Pt demo good understanding of the education provided. Carlos demonstrated good return demonstration of activities.     Assessment:   A/PROM: right knee  Flexion: 110/120 degrees. No pain  Extension: 0/+2 degrees. No pain    MMT: right knee  Flexion " 4+/5  Extension 4/5    Hip abduction: 4-/5    Gait: independent with improvement noted with knee extension.      Pt will continue to benefit from skilled PT intervention. Medical Necessity is demonstrated by:  Weakness.    Patient is making good progress towards established goals.    Plan:  Patient was given instruction with demonstration on HEP along with re-testing her mobility upon return.  Patient to be reassessed in one month f/u due to insurance purposes/denial.  Continue with established Plan of Care towards PT goals.

## 2017-08-14 ENCOUNTER — TELEPHONE (OUTPATIENT)
Dept: OBSTETRICS AND GYNECOLOGY | Facility: CLINIC | Age: 27
End: 2017-08-14

## 2017-08-14 NOTE — TELEPHONE ENCOUNTER
----- Message from Shital Strickland sent at 8/14/2017  2:56 PM CDT -----  Contact: Gabino Palacios evening,    Appointment Request From: Carlos Coulter    With Provider: Dominique Hall MD [[facility]]    Would Accept With:Only the person I've selected    Preferred Date Range: From 8/14/2017 To 8/16/2017    Preferred Times: Any    Reason for visit: Request an Appt    Comments:  I think I'm pregnant but haven't took a test yet

## 2017-08-18 ENCOUNTER — OFFICE VISIT (OUTPATIENT)
Dept: OBSTETRICS AND GYNECOLOGY | Facility: CLINIC | Age: 27
End: 2017-08-18
Payer: MEDICAID

## 2017-08-18 VITALS
SYSTOLIC BLOOD PRESSURE: 110 MMHG | BODY MASS INDEX: 33.28 KG/M2 | DIASTOLIC BLOOD PRESSURE: 80 MMHG | WEIGHT: 170.44 LBS

## 2017-08-18 DIAGNOSIS — Z32.02 PREGNANCY TEST NEGATIVE: ICD-10-CM

## 2017-08-18 DIAGNOSIS — N92.6 IRREGULAR PERIODS: Primary | ICD-10-CM

## 2017-08-18 PROCEDURE — 99999 PR PBB SHADOW E&M-EST. PATIENT-LVL II: CPT | Mod: PBBFAC,,, | Performed by: OBSTETRICS & GYNECOLOGY

## 2017-08-18 PROCEDURE — 3008F BODY MASS INDEX DOCD: CPT | Mod: ,,, | Performed by: OBSTETRICS & GYNECOLOGY

## 2017-08-18 PROCEDURE — 99213 OFFICE O/P EST LOW 20 MIN: CPT | Mod: S$PBB,,, | Performed by: OBSTETRICS & GYNECOLOGY

## 2017-08-18 PROCEDURE — 99212 OFFICE O/P EST SF 10 MIN: CPT | Mod: PBBFAC,PN | Performed by: OBSTETRICS & GYNECOLOGY

## 2017-08-18 RX ORDER — PANTOPRAZOLE SODIUM 40 MG/1
TABLET, DELAYED RELEASE ORAL
Refills: 6 | COMMUNITY
Start: 2017-07-17 | End: 2018-01-11 | Stop reason: SINTOL

## 2017-08-18 RX ORDER — MEDROXYPROGESTERONE ACETATE 10 MG/1
10 TABLET ORAL DAILY
Qty: 10 TABLET | Refills: 11 | Status: ON HOLD | OUTPATIENT
Start: 2017-08-18 | End: 2017-09-05 | Stop reason: CLARIF

## 2017-08-18 NOTE — PROGRESS NOTES
Chief Complaint   Patient presents with    Advice Only     Pt thought she was pregnant, NEG UPT today, she wants to discuss getting pregnant she has been trying for 2 years       History of Present Illness: Carlos Coulter is a 26 y.o. female that presents today 8/18/2017 for   Chief Complaint   Patient presents with    Advice Only     Pt thought she was pregnant, NEG UPT today, she wants to discuss getting pregnant she has been trying for 2 years   she stopped her vyvance. She reports that still ability. She is not taking trazodone. She sees Dr. Godoy for her bipolar.       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; casted B    Bipolar disorder     sees psych    Chronic constipation     Depression     sees psych    Eczema     GERD (gastroesophageal reflux disease)     Growth hormone deficiency     tx with growth hormone ages 12 - 15 by Dr Brewer    Heart murmur     saw cardiology 9/2014, intermittent, asymptomatic    Hemorrhoid     Hypothyroidism     sees Dr Brewer (endo)    Insomnia     Learning disability     NOLAN (obstructive sleep apnea)     does not use CPap    Speech abnormality        Past Surgical History:   Procedure Laterality Date    ADENOIDECTOMY  age 4    FOOT SURGERY  11/13/2014. 3/2015    L foot    FOOT SURGERY Right 10/2014    Right foot surgery    KNEE SURGERY  right/11/2015    KNEE SURGERY Right 03/09/2017    injury to right knee    MULTIPLE TOOTH EXTRACTIONS      MUSCLE RELEASE Bilateral     Lower extremities due to congenital club feet    TYMPANOSTOMY TUBE PLACEMENT  age 4    UPPER GASTROINTESTINAL ENDOSCOPY  05/2016    Dr. Nguyen       Current Outpatient Prescriptions   Medication Sig Dispense Refill    albuterol (PROAIR HFA) 90 mcg/actuation  inhaler INHALE 1 PUFF EVERY 4 HOURS PRN for Wheezing 8.5 g 2    albuterol (PROVENTIL) 2.5 mg /3 mL (0.083 %) nebulizer solution Take 3 mLs (2.5 mg total) by nebulization every 6 (six) hours as needed for Wheezing. 1 Box 3    aripiprazole (ABILIFY) 5 MG Tab Take 5 mg by mouth once daily.      levothyroxine (SYNTHROID) 88 MCG tablet Take 1 tablet (88 mcg total) by mouth once daily. 30 tablet 1    montelukast (SINGULAIR) 10 mg tablet Take 1 tablet (10 mg total) by mouth once daily. 90 tablet 3    trazodone (DESYREL) 100 MG tablet Take 100 mg by mouth nightly as needed for Insomnia.      clobetasol (TEMOVATE) 0.05 % cream Apply 1 application topically daily as needed.       DILTIAZEM HCL (DILTIAZEM 2% CREAM) Apply topically 3 (three) times daily. Apply topically to anal area. 30 g 0    docusate sodium (COLACE) 100 MG capsule Take 1 capsule (100 mg total) by mouth 2 (two) times daily. 60 capsule 0    FLONASE ALLERGY RELIEF 50 mcg/actuation nasal spray SHAKE WELL AND USE 2 SPRAYS IN EACH NOSTRIL EVERY DAY 1 Bottle 11    inhalation device (BREATHERITE VALVED MDI SPACER) Use as directed for inhalation. 1 Device 0    pantoprazole (PROTONIX) 40 MG tablet   6    triamcinolone acetonide 0.1% (KENALOG) 0.1 % ointment AAA bid x 2 weeks then prn, avoid chronic use 454 g 1     No current facility-administered medications for this visit.        Review of patient's allergies indicates:   Allergen Reactions    Grapefruit Anaphylaxis    Pineapple Anaphylaxis    Augmentin [amoxicillin-pot clavulanate] Hives    Penicillins Hives    Tamiflu [oseltamivir] Diarrhea and Nausea And Vomiting       Family History   Problem Relation Age of Onset    Diabetes Mother     Heart disease Mother     Thyroid disease Mother     Depression Mother     Asthma Mother     Arthritis Mother     Allergic rhinitis Mother     Colon polyps Mother     Depression Father     Migraines Father     Thyroid disease Father     PAVEL disease  Father     Early death Brother       1 hour after birth    Thyroid disease Maternal Grandmother     Seizures Maternal Grandmother     Clotting disorder Maternal Grandmother     Pulmonary fibrosis Maternal Grandfather     Colon cancer Maternal Grandfather     Diabetes Paternal Grandmother     Arthritis Paternal Grandmother     Schizophrenia Paternal Grandmother     Depression Paternal Grandmother     Early death Paternal Grandfather 54    Aneurysm Paternal Grandfather     Angioedema Neg Hx     Immunodeficiency Neg Hx     Urticaria Neg Hx     Collagen disease Neg Hx        Social History   Substance Use Topics    Smoking status: Former Smoker     Quit date: 2014    Smokeless tobacco: Never Used    Alcohol use No       OB History    Para Term  AB Living   1       1     SAB TAB Ectopic Multiple Live Births   1              # Outcome Date GA Lbr Luis/2nd Weight Sex Delivery Anes PTL Lv   1 SAB                   Review of Symptoms:  GENERAL: Denies weight gain or weight loss. Feeling well overall.   SKIN: Denies rash or lesions.   HEAD: Denies head injury or headache.   NODES: Denies enlarged lymph nodes.   CHEST: Denies chest pain or shortness of breath.   CARDIOVASCULAR: Denies palpitations or left sided chest pain.   ABDOMEN: No abdominal pain, constipation, diarrhea, nausea, vomiting or rectal bleeding.   URINARY: No frequency, dysuria, hematuria, or burning on urination.  HEMATOLOGIC: No easy bruisability or excessive bleeding.   MUSCULOSKELETAL: Denies joint pain or swelling.     /80   Wt 77.3 kg (170 lb 6.7 oz)   LMP 2017       ASSESSMENT/PLAN:  Irregular periods    Pregnancy test negative  -     POCT urine pregnancy        15 minutes spent today with this patient. Greater than half spent in counseling today.

## 2017-08-22 ENCOUNTER — PATIENT MESSAGE (OUTPATIENT)
Dept: FAMILY MEDICINE | Facility: CLINIC | Age: 27
End: 2017-08-22

## 2017-08-22 ENCOUNTER — LAB VISIT (OUTPATIENT)
Dept: LAB | Facility: HOSPITAL | Age: 27
End: 2017-08-22
Attending: FAMILY MEDICINE
Payer: MEDICAID

## 2017-08-22 DIAGNOSIS — E03.4 HYPOTHYROIDISM DUE TO ACQUIRED ATROPHY OF THYROID: ICD-10-CM

## 2017-08-22 LAB — TSH SERPL DL<=0.005 MIU/L-ACNC: 2.13 UIU/ML

## 2017-08-22 PROCEDURE — 36415 COLL VENOUS BLD VENIPUNCTURE: CPT | Mod: PO

## 2017-08-22 PROCEDURE — 84443 ASSAY THYROID STIM HORMONE: CPT

## 2017-08-23 ENCOUNTER — PATIENT MESSAGE (OUTPATIENT)
Dept: OBSTETRICS AND GYNECOLOGY | Facility: CLINIC | Age: 27
End: 2017-08-23

## 2017-08-23 ENCOUNTER — TELEPHONE (OUTPATIENT)
Dept: GASTROENTEROLOGY | Facility: CLINIC | Age: 27
End: 2017-08-23

## 2017-08-23 DIAGNOSIS — N91.2 ABSENT MENSES: Primary | ICD-10-CM

## 2017-08-23 NOTE — TELEPHONE ENCOUNTER
----- Message from Kavya Rankin sent at 8/23/2017  3:15 PM CDT -----  Contact: self [phone]  Patient is requesting a call back from the nurse stated she is a patient of dr massey, food keep getting stuck in her esophagus, need to be seen asap.  Please call the patient upon request at phone number [phone].

## 2017-08-23 NOTE — TELEPHONE ENCOUNTER
Pt has been scheduled for EGD on 9/5. Reviewed instructions. Pt is aware I will be mailing instructions and confirmation letter.

## 2017-08-29 RX ORDER — MEDROXYPROGESTERONE ACETATE 10 MG/1
10 TABLET ORAL DAILY
Qty: 10 TABLET | Refills: 0 | Status: SHIPPED | OUTPATIENT
Start: 2017-08-29 | End: 2018-01-15

## 2017-09-04 ENCOUNTER — PATIENT MESSAGE (OUTPATIENT)
Dept: FAMILY MEDICINE | Facility: CLINIC | Age: 27
End: 2017-09-04

## 2017-09-05 ENCOUNTER — HOSPITAL ENCOUNTER (OUTPATIENT)
Facility: HOSPITAL | Age: 27
Discharge: HOME | End: 2017-09-05
Attending: INTERNAL MEDICINE | Admitting: INTERNAL MEDICINE
Payer: MEDICAID

## 2017-09-05 ENCOUNTER — SURGERY (OUTPATIENT)
Age: 27
End: 2017-09-05

## 2017-09-05 ENCOUNTER — ANESTHESIA EVENT (OUTPATIENT)
Dept: ENDOSCOPY | Facility: HOSPITAL | Age: 27
End: 2017-09-05
Payer: MEDICAID

## 2017-09-05 ENCOUNTER — ANESTHESIA (OUTPATIENT)
Dept: ENDOSCOPY | Facility: HOSPITAL | Age: 27
End: 2017-09-05
Payer: MEDICAID

## 2017-09-05 VITALS
BODY MASS INDEX: 33.41 KG/M2 | DIASTOLIC BLOOD PRESSURE: 78 MMHG | WEIGHT: 170.19 LBS | RESPIRATION RATE: 16 BRPM | SYSTOLIC BLOOD PRESSURE: 130 MMHG | HEART RATE: 66 BPM | HEIGHT: 60 IN | RESPIRATION RATE: 18 BRPM | TEMPERATURE: 97 F | OXYGEN SATURATION: 98 %

## 2017-09-05 DIAGNOSIS — R13.10 DYSPHAGIA: ICD-10-CM

## 2017-09-05 PROCEDURE — 88305 TISSUE EXAM BY PATHOLOGIST: CPT | Mod: 26,,, | Performed by: PATHOLOGY

## 2017-09-05 PROCEDURE — 25000003 PHARM REV CODE 250: Mod: PO | Performed by: INTERNAL MEDICINE

## 2017-09-05 PROCEDURE — 63600175 PHARM REV CODE 636 W HCPCS: Mod: PO | Performed by: NURSE ANESTHETIST, CERTIFIED REGISTERED

## 2017-09-05 PROCEDURE — D9220A PRA ANESTHESIA: Mod: ANES,,, | Performed by: ANESTHESIOLOGY

## 2017-09-05 PROCEDURE — D9220A PRA ANESTHESIA: Mod: CRNA,,, | Performed by: NURSE ANESTHETIST, CERTIFIED REGISTERED

## 2017-09-05 PROCEDURE — 88305 TISSUE EXAM BY PATHOLOGIST: CPT | Performed by: PATHOLOGY

## 2017-09-05 PROCEDURE — 37000009 HC ANESTHESIA EA ADD 15 MINS: Mod: PO | Performed by: INTERNAL MEDICINE

## 2017-09-05 PROCEDURE — 37000008 HC ANESTHESIA 1ST 15 MINUTES: Mod: PO | Performed by: INTERNAL MEDICINE

## 2017-09-05 PROCEDURE — 43248 EGD GUIDE WIRE INSERTION: CPT | Mod: PO | Performed by: INTERNAL MEDICINE

## 2017-09-05 PROCEDURE — 27201012 HC FORCEPS, HOT/COLD, DISP: Mod: PO | Performed by: INTERNAL MEDICINE

## 2017-09-05 PROCEDURE — 43239 EGD BIOPSY SINGLE/MULTIPLE: CPT | Mod: 51,,, | Performed by: INTERNAL MEDICINE

## 2017-09-05 PROCEDURE — 43239 EGD BIOPSY SINGLE/MULTIPLE: CPT | Mod: PO | Performed by: INTERNAL MEDICINE

## 2017-09-05 PROCEDURE — 87449 NOS EACH ORGANISM AG IA: CPT | Mod: PO | Performed by: INTERNAL MEDICINE

## 2017-09-05 PROCEDURE — 43248 EGD GUIDE WIRE INSERTION: CPT | Mod: ,,, | Performed by: INTERNAL MEDICINE

## 2017-09-05 RX ORDER — LIDOCAINE HCL/PF 100 MG/5ML
SYRINGE (ML) INTRAVENOUS
Status: DISCONTINUED | OUTPATIENT
Start: 2017-09-05 | End: 2017-09-05

## 2017-09-05 RX ORDER — LEVOTHYROXINE SODIUM 88 UG/1
TABLET ORAL
Qty: 30 TABLET | Refills: 5 | Status: SHIPPED | OUTPATIENT
Start: 2017-09-05 | End: 2018-04-01 | Stop reason: SDUPTHER

## 2017-09-05 RX ORDER — PROPOFOL 10 MG/ML
VIAL (ML) INTRAVENOUS
Status: DISCONTINUED | OUTPATIENT
Start: 2017-09-05 | End: 2017-09-05

## 2017-09-05 RX ORDER — LIDOCAINE HYDROCHLORIDE 10 MG/ML
1 INJECTION, SOLUTION EPIDURAL; INFILTRATION; INTRACAUDAL; PERINEURAL ONCE
Status: COMPLETED | OUTPATIENT
Start: 2017-09-05 | End: 2017-09-05

## 2017-09-05 RX ORDER — SODIUM CHLORIDE, SODIUM LACTATE, POTASSIUM CHLORIDE, CALCIUM CHLORIDE 600; 310; 30; 20 MG/100ML; MG/100ML; MG/100ML; MG/100ML
INJECTION, SOLUTION INTRAVENOUS CONTINUOUS
Status: DISCONTINUED | OUTPATIENT
Start: 2017-09-05 | End: 2017-09-05 | Stop reason: HOSPADM

## 2017-09-05 RX ADMIN — PROPOFOL 20 MG: 10 INJECTION, EMULSION INTRAVENOUS at 10:09

## 2017-09-05 RX ADMIN — SODIUM CHLORIDE, SODIUM LACTATE, POTASSIUM CHLORIDE, AND CALCIUM CHLORIDE: .6; .31; .03; .02 INJECTION, SOLUTION INTRAVENOUS at 08:09

## 2017-09-05 RX ADMIN — LIDOCAINE HYDROCHLORIDE 50 MG: 20 INJECTION, SOLUTION INTRAVENOUS at 09:09

## 2017-09-05 RX ADMIN — PROPOFOL 50 MG: 10 INJECTION, EMULSION INTRAVENOUS at 09:09

## 2017-09-05 RX ADMIN — LIDOCAINE HYDROCHLORIDE 1 MG: 10 INJECTION, SOLUTION EPIDURAL; INFILTRATION; INTRACAUDAL; PERINEURAL at 08:09

## 2017-09-05 RX ADMIN — PROPOFOL 100 MG: 10 INJECTION, EMULSION INTRAVENOUS at 09:09

## 2017-09-05 NOTE — ANESTHESIA PREPROCEDURE EVALUATION
09/05/2017  Carlos Coulter is a 26 y.o., female.    Pre-op Assessment    I have reviewed the Patient Summary Reports.     I have reviewed the Nursing Notes.   I have reviewed the Medications.     Review of Systems  Anesthesia Hx:  Denies Hx of Anesthetic complications    Social:  Former Smoker Former smoker, quit 2014   EENT/Dental:   chronic allergic rhinitis   Cardiovascular:   Exercise tolerance: good    Pulmonary:   Asthma mild    Hepatic/GI:   GERD    Endocrine:   Hypothyroidism    Psych:   Psychiatric History anxiety depression Bipolar d/o, on abilify  ADHD, on vyvanse  Learning/speech disability         Physical Exam  General:  Obesity    Airway/Jaw/Neck:  Airway Findings: Mouth Opening: Normal Tongue: Normal  General Airway Assessment: Adult, Average  Mallampati: II  Jaw/Neck Findings:  Neck ROM: Normal ROM      Dental:  Dental Findings: Periodontal disease, Severe    Chest/Lungs:  Chest/Lungs Findings: Clear to auscultation, Normal Respiratory Rate     Heart/Vascular:  Heart Findings: Rate: Normal  Rhythm: Regular Rhythm  Sounds: Normal  Heart murmur: negative       Mental Status:  Mental Status Findings:  Cooperative, Alert and Oriented         Anesthesia Plan  Type of Anesthesia, risks & benefits discussed:  Anesthesia Type:  general  Patient's Preference:   Intra-op Monitoring Plan:   Intra-op Monitoring Plan Comments:   Post Op Pain Control Plan:   Post Op Pain Control Plan Comments:   Induction:   IV  Beta Blocker:  Patient is not currently on a Beta-Blocker (No further documentation required).       Informed Consent: Patient understands risks and agrees with Anesthesia plan.  Questions answered. Anesthesia consent signed with patient.  ASA Score: 3     Day of Surgery Review of History & Physical: I have interviewed and examined the patient. I have reviewed the patient's H&P dated:   There are no significant changes.      Anesthesia Plan Notes: Propofol general.        Ready For Surgery From Anesthesia Perspective.

## 2017-09-05 NOTE — TRANSFER OF CARE
Anesthesia Transfer of Care Note    Patient: Carlos Coulter    Procedure(s) Performed: Procedure(s) (LRB):  ESOPHAGOGASTRODUODENOSCOPY (EGD) (N/A)    Patient location: PACU    Anesthesia Type: general    Transport from OR: Transported from OR on room air with adequate spontaneous ventilation    Post pain: adequate analgesia    Post assessment: no apparent anesthetic complications and tolerated procedure well    Post vital signs: stable    Level of consciousness: awake and sedated    Nausea/Vomiting: no nausea/vomiting    Complications: none    Transfer of care protocol was followed      Last vitals:   Visit Vitals  BP (!) 112/58 (BP Location: Right arm, Patient Position: Lying)   Pulse 66   Temp 36.5 °C (97.7 °F) (Skin)   Resp 18   Ht 5' (1.524 m)   Wt 77.2 kg (170 lb 3.1 oz)   LMP 08/25/2017   SpO2 98%   Breastfeeding? No   BMI 33.24 kg/m²

## 2017-09-05 NOTE — H&P
History & Physical - Short Stay  Gastroenterology      SUBJECTIVE:     Procedure: EGD    Chief Complaint/Indication for Procedure: Dysphagia    PTA Medications   Medication Sig    albuterol (PROAIR HFA) 90 mcg/actuation inhaler INHALE 1 PUFF EVERY 4 HOURS PRN for Wheezing    albuterol (PROVENTIL) 2.5 mg /3 mL (0.083 %) nebulizer solution Take 3 mLs (2.5 mg total) by nebulization every 6 (six) hours as needed for Wheezing.    aripiprazole (ABILIFY) 5 MG Tab Take 5 mg by mouth once daily.    clobetasol (TEMOVATE) 0.05 % cream Apply 1 application topically daily as needed.     docusate sodium (COLACE) 100 MG capsule Take 1 capsule (100 mg total) by mouth 2 (two) times daily.    FLONASE ALLERGY RELIEF 50 mcg/actuation nasal spray SHAKE WELL AND USE 2 SPRAYS IN EACH NOSTRIL EVERY DAY    levothyroxine (SYNTHROID) 88 MCG tablet TAKE 1 TABLET(88 MCG) BY MOUTH EVERY DAY    montelukast (SINGULAIR) 10 mg tablet Take 1 tablet (10 mg total) by mouth once daily.    trazodone (DESYREL) 100 MG tablet Take 100 mg by mouth nightly as needed for Insomnia.    DILTIAZEM HCL (DILTIAZEM 2% CREAM) Apply topically 3 (three) times daily. Apply topically to anal area.    inhalation device (BREATHERITE VALVED MDI SPACER) Use as directed for inhalation.    medroxyPROGESTERone (PROVERA) 10 MG tablet Take 1 tablet (10 mg total) by mouth once daily.    pantoprazole (PROTONIX) 40 MG tablet     triamcinolone acetonide 0.1% (KENALOG) 0.1 % ointment AAA bid x 2 weeks then prn, avoid chronic use       Review of patient's allergies indicates:   Allergen Reactions    Grapefruit Anaphylaxis    Pineapple Anaphylaxis    Augmentin [amoxicillin-pot clavulanate] Hives    Penicillins Hives    Tamiflu [oseltamivir] Diarrhea and Nausea And Vomiting        Past Medical History:   Diagnosis Date    ADHD (attention deficit hyperactivity disorder)     sees psych    AR (allergic rhinitis)     causes frequent ear problems    Asthma     since  childhood, some wheeze with exertion    Bilateral club feet     at birth; casted B    Bipolar disorder     sees psych    Chronic constipation     Depression     sees psych    Eczema     GERD (gastroesophageal reflux disease)     Growth hormone deficiency     tx with growth hormone ages 12 - 15 by Dr Brewer    Heart murmur     saw cardiology 2014, intermittent, asymptomatic    Hemorrhoid     Hypothyroidism     sees Dr Brewer (endo)    Insomnia     Learning disability     NOLAN (obstructive sleep apnea)     does not use CPap    Speech abnormality      Past Surgical History:   Procedure Laterality Date    ADENOIDECTOMY  age 4    FOOT SURGERY  2014. 3/2015    L foot    FOOT SURGERY Right 10/2014    Right foot surgery    KNEE SURGERY  right/2015    KNEE SURGERY Right 2017    injury to right knee    MULTIPLE TOOTH EXTRACTIONS      MUSCLE RELEASE Bilateral     Lower extremities due to congenital club feet    TYMPANOSTOMY TUBE PLACEMENT  age 4    UPPER GASTROINTESTINAL ENDOSCOPY  2016    Dr. Nguyen     Family History   Problem Relation Age of Onset    Diabetes Mother     Heart disease Mother     Thyroid disease Mother     Depression Mother     Asthma Mother     Arthritis Mother     Allergic rhinitis Mother     Colon polyps Mother     Depression Father     Migraines Father     Thyroid disease Father     PAVEL disease Father     Early death Brother       1 hour after birth    Thyroid disease Maternal Grandmother     Seizures Maternal Grandmother     Clotting disorder Maternal Grandmother     Pulmonary fibrosis Maternal Grandfather     Colon cancer Maternal Grandfather     Diabetes Paternal Grandmother     Arthritis Paternal Grandmother     Schizophrenia Paternal Grandmother     Depression Paternal Grandmother     Early death Paternal Grandfather 54    Aneurysm Paternal Grandfather     Angioedema Neg Hx     Immunodeficiency Neg Hx     Urticaria Neg Hx      Collagen disease Neg Hx      Social History   Substance Use Topics    Smoking status: Former Smoker     Quit date: 11/20/2014    Smokeless tobacco: Never Used    Alcohol use No         OBJECTIVE:     Vital Signs (Most Recent)  Temp: 97.7 °F (36.5 °C) (09/05/17 0855)  Pulse: 66 (09/05/17 0855)  Resp: 18 (09/05/17 0855)  BP: (!) 112/58 (09/05/17 0855)  SpO2: 98 % (09/05/17 0855)    Physical Exam:                                                       GENERAL:  Comfortable, in no acute distress.                                 HEENT EXAM:  Nonicteric.  No adenopathy.  Oropharynx is clear.               NECK:  Supple.                                                               LUNGS:  Clear.                                                               CARDIAC:  Regular rate and rhythm.  S1, S2.  No murmur.                      ABDOMEN:  Soft, positive bowel sounds, nontender.  No hepatosplenomegaly or masses.  No rebound or guarding.                                             EXTREMITIES:  No edema.     MENTAL STATUS:  Normal, alert and oriented.      ASSESSMENT/PLAN:     Assessment: Dysphagia    Plan: EGD    Anesthesia Plan: General    ASA Grade: ASA 2 - Patient with mild systemic disease with no functional limitations    MALLAMPATI SCORE:  I (soft palate, uvula, fauces, and tonsillar pillars visible)

## 2017-09-05 NOTE — DISCHARGE INSTRUCTIONS
Recovery After Procedural Sedation (Adult)  You have been given medicine by vein to make you sleep during your surgery. This may have included both a pain medicine and sleeping medicine. Most of the effects have worn off. But you may still have some drowsiness for the next 6 to 8 hours.  Home care  Follow these guidelines when you get home:  · For the next 8 hours, you should be watched by a responsible adult. This person should make sure your condition is not getting worse.  · Don't drink any alcohol for the next 24 hours.  · Don't drive, operate dangerous machinery, or make important business or personal decisions during the next 24 hours.  Note: Your healthcare provider may tell you not to take any medicine by mouth for pain or sleep in the next 4 hours. These medicines may react with the medicines you were given in the hospital. This could cause a much stronger response than usual.  Follow-up care  Follow up with your healthcare provider if you are not alert and back to your usual level of activity within 12 hours.  When to seek medical advice  Call your healthcare provider right away if any of these occur:  · Drowsiness gets worse  · Weakness or dizziness gets worse  · Repeated vomiting  · You can't be awakened   Date Last Reviewed: 10/18/2016  © 0947-7641 [employer]. [address] [place] [address], [place] [address]. All rights reserved. This information is not intended as a substitute for professional medical care. Always follow your healthcare professional's instructions.

## 2017-09-05 NOTE — ANESTHESIA POSTPROCEDURE EVALUATION
Anesthesia Post Evaluation    Patient: Carlos Coulter    Procedure(s) Performed: Procedure(s) (LRB):  ESOPHAGOGASTRODUODENOSCOPY (EGD) (N/A)    Final Anesthesia Type: general  Patient location during evaluation: PACU  Patient participation: Yes- Able to Participate  Level of consciousness: awake and alert and oriented  Post-procedure vital signs: reviewed and stable  Pain management: adequate  Airway patency: patent  PONV status at discharge: No PONV  Anesthetic complications: no      Cardiovascular status: blood pressure returned to baseline  Respiratory status: unassisted, spontaneous ventilation and room air  Hydration status: euvolemic  Follow-up not needed.        Visit Vitals  /78 (BP Location: Left arm, Patient Position: Lying)   Pulse 66   Temp 36.2 °C (97.2 °F) (Skin)   Resp 18   Ht 5' (1.524 m)   Wt 77.2 kg (170 lb 3.1 oz)   LMP 08/25/2017   SpO2 98%   Breastfeeding? No   BMI 33.24 kg/m²       Pain/Nae Score: Pain Assessment Performed: Yes (9/5/2017 10:40 AM)  Presence of Pain: denies (9/5/2017 10:40 AM)  Nae Score: 10 (9/5/2017 10:40 AM)

## 2017-09-05 NOTE — DISCHARGE SUMMARY
Discharge Note  Short Stay      SUMMARY     Admit Date: 9/5/2017    Attending Physician: Rubio Nguyen MD     Discharge Physician: Rubio Nguyen MD    Discharge Date: 9/5/2017 10:15 AM    Final Diagnosis: Dysphagia, unspecified type [R13.10]    Disposition: HOME OR SELF CARE    Patient Instructions:   Current Discharge Medication List      START taking these medications    Details   ranitidine (ZANTAC) 300 MG tablet Take 1 tablet (300 mg total) by mouth every evening.  Qty: 30 tablet, Refills: 2         CONTINUE these medications which have NOT CHANGED    Details   albuterol (PROAIR HFA) 90 mcg/actuation inhaler INHALE 1 PUFF EVERY 4 HOURS PRN for Wheezing  Qty: 8.5 g, Refills: 2      albuterol (PROVENTIL) 2.5 mg /3 mL (0.083 %) nebulizer solution Take 3 mLs (2.5 mg total) by nebulization every 6 (six) hours as needed for Wheezing.  Qty: 1 Box, Refills: 3      aripiprazole (ABILIFY) 5 MG Tab Take 5 mg by mouth once daily.      clobetasol (TEMOVATE) 0.05 % cream Apply 1 application topically daily as needed.       docusate sodium (COLACE) 100 MG capsule Take 1 capsule (100 mg total) by mouth 2 (two) times daily.  Qty: 60 capsule, Refills: 0    Associated Diagnoses: Knee effusion, right; New ACL tear, right, subsequent encounter; Acute pain of right knee; Chondromalacia of knee, right; Post-op pain; Acute medial meniscus tear, right, initial encounter      FLONASE ALLERGY RELIEF 50 mcg/actuation nasal spray SHAKE WELL AND USE 2 SPRAYS IN EACH NOSTRIL EVERY DAY  Qty: 1 Bottle, Refills: 11      levothyroxine (SYNTHROID) 88 MCG tablet TAKE 1 TABLET(88 MCG) BY MOUTH EVERY DAY  Qty: 30 tablet, Refills: 5      montelukast (SINGULAIR) 10 mg tablet Take 1 tablet (10 mg total) by mouth once daily.  Qty: 90 tablet, Refills: 3      trazodone (DESYREL) 100 MG tablet Take 100 mg by mouth nightly as needed for Insomnia.      DILTIAZEM HCL (DILTIAZEM 2% CREAM) Apply topically 3 (three) times daily. Apply topically to  anal area.  Qty: 30 g, Refills: 0      inhalation device (BREATHERITE VALVED MDI SPACER) Use as directed for inhalation.  Qty: 1 Device, Refills: 0      medroxyPROGESTERone (PROVERA) 10 MG tablet Take 1 tablet (10 mg total) by mouth once daily.  Qty: 10 tablet, Refills: 0      pantoprazole (PROTONIX) 40 MG tablet Refills: 6      triamcinolone acetonide 0.1% (KENALOG) 0.1 % ointment AAA bid x 2 weeks then prn, avoid chronic use  Qty: 454 g, Refills: 1    Associated Diagnoses: Atopic dermatitis             Discharge Procedure Orders (must include Diet, Follow-up, Activity)    Follow Up:  Follow up with PCP as previously scheduled  Resume routine diet.  Activity as tolerated.    No driving day of procedure.

## 2017-09-12 ENCOUNTER — CLINICAL SUPPORT (OUTPATIENT)
Dept: REHABILITATION | Facility: HOSPITAL | Age: 27
End: 2017-09-12
Attending: ORTHOPAEDIC SURGERY
Payer: MEDICAID

## 2017-09-12 DIAGNOSIS — M25.561 ACUTE PAIN OF RIGHT KNEE: Primary | ICD-10-CM

## 2017-09-12 NOTE — PROGRESS NOTES
Name: Carlos Coulter  [facility] Number: [id number]  Date of Treatment: 09/12/2017   Diagnosis:    Acute pain of right knee Yes   Primary Diagnosis:    1. Acute pain of right knee          S/P ACL reconstruction, 3/9/2017      Status post lateral meniscus repair      Status post medial meniscus repair          Time in: 1445  Time Out:1530    Subjective:    Carlos reports not doing home work since last session but has noticed her walking has improved. Patient reported this will be her last session and will continue wit HEP along with husbands assistance. Patient reports their pain to be 0/10 on a 0-10 scale with 0 being no pain and 10 being the worst pain imaginable.    Objective    Carlos received therapeutic exercises to develop bilateral LE strength,  Neuromuscular control, gait for ADL purposes.    -prone knee hangs x 10 minutes, 5# , f/b manual over pressure       Written Home Exercises Provided: Continuation. Re-addressed HEP : quad sets, SLR, hip abd/add/ext 3/10, sit tot stands without AD, CLAM-Shells, knee hangs with band continues.    Pt demo good understanding of the education provided. Carlos demonstrated good return demonstration of activities.     Assessment:   -patient noted with similar results compared to last assessment.    A/PROM: right knee  Flexion: 110/122 degrees. (NP)  Extension:0 /+2 degrees. (NP)    MMT: right knee  Flexion 4+/5  Extension 4+/5    Hip abduction: 4-/5    Gait: independent     Plan:  Patient to follow up with MD.  Discharge.

## 2017-09-28 ENCOUNTER — TELEPHONE (OUTPATIENT)
Dept: GASTROENTEROLOGY | Facility: CLINIC | Age: 27
End: 2017-09-28

## 2017-09-28 NOTE — TELEPHONE ENCOUNTER
----- Message from Jessica Leung sent at 9/28/2017 12:30 PM CDT -----  Contact: Pt  Pt has an appt on 12/08/17. Pt is requesting to reschedule for an appt sooner than the next available. Pls call pt back at [phone].

## 2017-10-05 ENCOUNTER — OFFICE VISIT (OUTPATIENT)
Dept: FAMILY MEDICINE | Facility: CLINIC | Age: 27
End: 2017-10-05
Payer: MEDICAID

## 2017-10-05 VITALS
TEMPERATURE: 99 F | WEIGHT: 175.06 LBS | HEIGHT: 60 IN | HEART RATE: 64 BPM | BODY MASS INDEX: 34.37 KG/M2 | SYSTOLIC BLOOD PRESSURE: 98 MMHG | RESPIRATION RATE: 18 BRPM | DIASTOLIC BLOOD PRESSURE: 58 MMHG

## 2017-10-05 DIAGNOSIS — R05.9 COUGH: ICD-10-CM

## 2017-10-05 DIAGNOSIS — J06.9 VIRAL URI: Primary | ICD-10-CM

## 2017-10-05 PROCEDURE — 99214 OFFICE O/P EST MOD 30 MIN: CPT | Mod: S$GLB,,, | Performed by: NURSE PRACTITIONER

## 2017-10-05 RX ORDER — LISDEXAMFETAMINE DIMESYLATE 40 MG/1
40 CAPSULE ORAL DAILY
COMMUNITY

## 2017-10-05 NOTE — PROGRESS NOTES
Subjective:       Patient ID: Carlos Coulter is a 26 y.o. female.    Chief Complaint: Shortness of Breath and Generalized Body Aches    HPI here with concerns regarding SOB, achy all over, discomfort with taking a deep breath. Onset 3 days ago. Started with headache. Chills, sinus congestion. States is feeling a little better today. Taking dayquil with some relief.     The following portion of the patients history was reviewed and updated as appropriate: allergies, current medications, past medical and surgical history. Past social history and problem list reviewed. Family PMH and Past social history reviewed. Tobacco, Illicit drug use reviewed.     Review of Systems   Constitutional: Positive for chills, fatigue and fever (low grade).   HENT: Negative for ear pain, postnasal drip, sinus pain, sinus pressure and sore throat.    Respiratory: Positive for cough (off and on) and shortness of breath. Negative for wheezing.    Cardiovascular: Negative for chest pain and palpitations.   Gastrointestinal: Negative for abdominal pain, diarrhea, nausea and vomiting.   Musculoskeletal: Positive for myalgias.   Neurological: Negative for dizziness and headaches.       Objective:     BP (!) 98/58 (BP Location: Left arm, Patient Position: Sitting, BP Method: Medium (Manual))   Pulse 64   Temp 98.7 °F (37.1 °C) (Oral)   Resp 18   Ht 5' (1.524 m)   Wt 79.4 kg (175 lb 0.7 oz)   LMP 08/25/2017   BMI 34.19 kg/m²      Physical Exam    Constitutional: oriented to person, place, and time. well-developed and well-nourished.   HENT: nares patent, no sinus pressure. Throat clear. Canals and TM clear.  Head: Normocephalic.   Eyes: Conjunctivae are normal. Pupils are equal, round, and reactive to light.   Neck: Normal range of motion. Neck supple. No tracheal deviation present. No thyromegaly present.   Cardiovascular: Normal rate, regular rhythm and normal heart sounds.    Pulmonary/Chest: Effort normal and breath sounds  normal. No respiratory distress. No wheezes.   Abdominal: Soft. Bowel sounds are normal. No distension. There is no tenderness.   Musculoskeletal: Normal range of motion. No edema.   Neurological: oriented to person, place, and time.   Skin: Skin is warm and dry. No rashes or lesions  Psychiatric: Normal mood and affect.Behavior is normal. Judgment and thought content normal.   Assessment:       1. Viral URI    2. Cough        Plan:         Carlos was seen today for shortness of breath and generalized body aches.    Diagnoses and all orders for this visit:    Viral URI: presents as viral. No indication that antibiotics are needed at this time.    Cough: delsym prn if needed    Continue current medication  Take medications only as prescribed  Healthy diet  Adequate rest  Adequate hydration  Avoid allergens  Avoid excessive caffeine

## 2017-10-12 RX ORDER — MONTELUKAST SODIUM 10 MG/1
TABLET ORAL
Qty: 90 TABLET | Refills: 1 | Status: SHIPPED | OUTPATIENT
Start: 2017-10-12 | End: 2018-03-14 | Stop reason: SDUPTHER

## 2017-10-24 ENCOUNTER — OFFICE VISIT (OUTPATIENT)
Dept: ORTHOPEDICS | Facility: CLINIC | Age: 27
End: 2017-10-24
Payer: MEDICAID

## 2017-10-24 VITALS
DIASTOLIC BLOOD PRESSURE: 84 MMHG | WEIGHT: 175 LBS | SYSTOLIC BLOOD PRESSURE: 125 MMHG | HEIGHT: 60 IN | HEART RATE: 71 BPM | BODY MASS INDEX: 34.36 KG/M2

## 2017-10-24 DIAGNOSIS — Z98.890 S/P RIGHT KNEE ARTHROSCOPY: Primary | ICD-10-CM

## 2017-10-24 DIAGNOSIS — M94.261 CHONDROMALACIA OF RIGHT KNEE: ICD-10-CM

## 2017-10-24 DIAGNOSIS — Z98.890 S/P ACL RECONSTRUCTION: ICD-10-CM

## 2017-10-24 DIAGNOSIS — Z98.890 STATUS POST LATERAL MENISCUS REPAIR: ICD-10-CM

## 2017-10-24 DIAGNOSIS — Z98.890 STATUS POST MEDIAL MENISCUS REPAIR: ICD-10-CM

## 2017-10-24 PROCEDURE — 99999 PR PBB SHADOW E&M-EST. PATIENT-LVL III: CPT | Mod: PBBFAC,,, | Performed by: ORTHOPAEDIC SURGERY

## 2017-10-24 PROCEDURE — 99213 OFFICE O/P EST LOW 20 MIN: CPT | Mod: PBBFAC,PO | Performed by: ORTHOPAEDIC SURGERY

## 2017-10-24 PROCEDURE — 99213 OFFICE O/P EST LOW 20 MIN: CPT | Mod: S$PBB,,, | Performed by: ORTHOPAEDIC SURGERY

## 2017-10-24 NOTE — PROGRESS NOTES
Subjective:          Chief Complaint: Carlos Coulter is a 26 y.o. female who had concerns including Pain of the Right Knee.    Ms. Coulter is here for f/u after her right knee surgery. She is using her brace as directed. Pain: 0/10. She is progressing on her own and does not have any new issues.    PREOPERATIVE DIAGNOSES:   1. Right knee anterior cruciate ligament tear.   2. Right knee medial meniscus tear.   3. Right knee lateral meniscus tear      POSTOPERATIVE DIAGNOSES:   1. Right knee anterior cruciate ligament tear.   2. Right knee medial meniscus tear.   3. Right knee lateral meniscus tear.      PROCEDURES:   1. Right knee anterior cruciate ligament reconstruction with ipsilateral   hamstring autograft AND gracilis allograft.   2. Right knee arthroscopic medial and lateral meniscus repair with lateral meniscus root repair  3. Right knee arthroscopic chondroplasty      SURGEON: Kalen Kaufman MD      ASSISTANTS: HALEY Mohan      COMPLICATIONS: None.       POSITION: Supine.       ANESTHESIA: General endotracheal plus right lower extremity femoral canal block.       COMPLICATIONS: None.       EBL: 20cc     EXAMINATION UNDER ANESTHESIA: Right knee, full range of motion, grade II B   positive Lachman, 2+ pivot shift. No opening to varus and mild valgus stress.       ARTHROSCOPIC FINDINGS:   1. Complete tearing, anterior cruciate ligament.   2. Medial meniscus posterior horn and body tear  3. Lateral meniscus posterior horn and meniscus root tear  4. Patellar chondromalacia Grade II  5. Medial femoral condyle chondromalacia Grade II      IMPLANTS:  RIDGID LOOP ADJUSTABLE BUTTON (STANDARD)  6 OMNI-SPAN MENISCAL REPAIR SUTURES  BIO-INTRAFIX TIBIAL SHEATH (LARGE)  BIO-INTRAFIX TAPERED SCREW (7-9X30MM)  MSKTF: Gracillis Tendon  6.5x25 mm cancellous screw  17mm spiked washer  #2 Orthocord      Pain   Pertinent negatives include no chills, fever or joint swelling.       Review of Systems    Constitution: Negative for chills and fever.   Musculoskeletal: Positive for muscle weakness. Negative for joint pain, joint swelling and stiffness.   All other systems reviewed and are negative.                  Objective:        General: Carlos is well-developed, well-nourished, appears stated age, in no acute distress, alert and oriented to time, place and person.     General    Vitals reviewed.  Constitutional: She is oriented to person, place, and time. She appears well-developed and well-nourished. No distress.   HENT:   Head: Normocephalic and atraumatic.   Nose: Nose normal.   Eyes: Pupils are equal, round, and reactive to light.   Cardiovascular: Normal rate.    Pulmonary/Chest: Effort normal.   Neurological: She is alert and oriented to person, place, and time.   Psychiatric: She has a normal mood and affect. Her behavior is normal. Judgment and thought content normal.     General Musculoskeletal Exam   Gait: normal       Right Knee Exam     Inspection   Erythema: absent  Scars: present  Swelling: absent  Effusion: effusion  Deformity: deformity  Bruising: absent (  )    Tenderness   The patient is experiencing no tenderness.         Range of Motion   Extension:  0 normal   Flexion:  140 abnormal     Tests   Ligament Examination Lachman: normal (-1 to 2mm) PCL-Posterior Drawer: normal (0 to 2mm)     MCL - Valgus: normal (0 to 2mm)  LCL - Varus: normal  Patella   Patellar Apprehension: negative  Passive Patellar Tilt: neutral  Patellar Tracking: normal  Patellar Glide (quadrants): Lateral - 1   Medial - 2  Q-Angle at 90 degrees: normal    Other   Sensation: normal    Comments:  Incisions healed    Muscle Strength   Right Lower Extremity   Hip Abduction: 5/5   Quadriceps:  5/5   Hamstrin/5     Vascular Exam     Right Pulses  Dorsalis Pedis:      2+  Posterior Tibial:      2+        Edema  Right Lower Leg: absent      Previous radiographic studies and results were reviewed with the patient:   2 views  of the right knee demonstrate evidence of prior ACL reconstruction with some cortical irregularity seen at the medial tibial anchor site.  Minimal displacement could not BE excluded.        Assessment:       Encounter Diagnoses   Name Primary?    S/P right knee arthroscopy Yes    S/P ACL reconstruction     Status post medial meniscus repair     Status post lateral meniscus repair     Chondromalacia of right knee           Plan:         Continue with increase in activities to include dance  Continue with home therapy per protocol  F/U PRN

## 2017-10-31 ENCOUNTER — OFFICE VISIT (OUTPATIENT)
Dept: OBSTETRICS AND GYNECOLOGY | Facility: CLINIC | Age: 27
End: 2017-10-31
Payer: MEDICAID

## 2017-10-31 VITALS — WEIGHT: 176.38 LBS | BODY MASS INDEX: 34.44 KG/M2

## 2017-10-31 DIAGNOSIS — N89.8 VAGINAL DISCHARGE: Primary | ICD-10-CM

## 2017-10-31 PROCEDURE — 87660 TRICHOMONAS VAGIN DIR PROBE: CPT

## 2017-10-31 PROCEDURE — 99999 PR PBB SHADOW E&M-EST. PATIENT-LVL III: CPT | Mod: PBBFAC,,, | Performed by: OBSTETRICS & GYNECOLOGY

## 2017-10-31 PROCEDURE — 87480 CANDIDA DNA DIR PROBE: CPT

## 2017-10-31 PROCEDURE — 99213 OFFICE O/P EST LOW 20 MIN: CPT | Mod: PBBFAC,PN | Performed by: OBSTETRICS & GYNECOLOGY

## 2017-10-31 PROCEDURE — 99213 OFFICE O/P EST LOW 20 MIN: CPT | Mod: S$PBB,,, | Performed by: OBSTETRICS & GYNECOLOGY

## 2017-10-31 NOTE — PROGRESS NOTES
Chief Complaint   Patient presents with    Vaginal Discharge     experienced discharge that was like mucous 1 time 2 weeks ago       History of Present Illness: Carlos Coulter is a 26 y.o. 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; casted B    Bipolar disorder     sees psych    Chronic constipation     Depression     sees psych    Eczema     GERD (gastroesophageal reflux disease)     Growth hormone deficiency     tx with growth hormone ages 12 - 15 by Dr Brewer    Heart murmur     saw cardiology 9/2014, intermittent, asymptomatic    Hemorrhoid     Hypothyroidism     sees Dr Brewer (endo)    Insomnia     Learning disability     NOLAN (obstructive sleep apnea)     does not use CPap    Speech abnormality        Past Surgical History:   Procedure Laterality Date    ADENOIDECTOMY  age 4    FOOT SURGERY  11/13/2014. 3/2015    L foot    FOOT SURGERY Right 10/2014    Right foot surgery    KNEE SURGERY  right/11/2015    KNEE SURGERY Right 03/09/2017    injury to right knee    MULTIPLE TOOTH EXTRACTIONS      MUSCLE RELEASE Bilateral     Lower extremities due to congenital club feet    TYMPANOSTOMY TUBE PLACEMENT  age 4    UPPER GASTROINTESTINAL ENDOSCOPY  05/2016    Dr. Nguyen       Current Outpatient Prescriptions   Medication Sig Dispense Refill    albuterol (PROAIR HFA) 90 mcg/actuation inhaler INHALE 1 PUFF EVERY 4 HOURS PRN for Wheezing 8.5 g 2    aripiprazole (ABILIFY) 5 MG Tab Take 5 mg by mouth once daily.      clobetasol (TEMOVATE) 0.05 % cream Apply 1 application topically daily as needed.        DILTIAZEM HCL (DILTIAZEM 2% CREAM) Apply topically 3 (three) times daily. Apply topically to anal area. 30 g 0    docusate sodium (COLACE) 100 MG capsule Take 1 capsule (100 mg total) by mouth 2 (two) times daily. 60 capsule 0    FLONASE ALLERGY RELIEF 50 mcg/actuation nasal spray SHAKE WELL AND USE 2 SPRAYS IN EACH NOSTRIL EVERY DAY 1 Bottle 11    inhalation device (BREATHERITE VALVED MDI SPACER) Use as directed for inhalation. 1 Device 0    levothyroxine (SYNTHROID) 88 MCG tablet TAKE 1 TABLET(88 MCG) BY MOUTH EVERY DAY 30 tablet 5    lisdexamfetamine (VYVANSE) 40 MG Cap Take 40 mg by mouth once daily.      medroxyPROGESTERone (PROVERA) 10 MG tablet Take 1 tablet (10 mg total) by mouth once daily. 10 tablet 0    montelukast (SINGULAIR) 10 mg tablet TAKE 1 TABLET(10 MG) BY MOUTH EVERY DAY 90 tablet 1    pantoprazole (PROTONIX) 40 MG tablet   6    ranitidine (ZANTAC) 300 MG tablet Take 1 tablet (300 mg total) by mouth every evening. 30 tablet 2    trazodone (DESYREL) 100 MG tablet Take 100 mg by mouth nightly as needed for Insomnia.      triamcinolone acetonide 0.1% (KENALOG) 0.1 % ointment AAA bid x 2 weeks then prn, avoid chronic use 454 g 1     No current facility-administered medications for this visit.        Review of patient's allergies indicates:   Allergen Reactions    Grapefruit Anaphylaxis    Pineapple Anaphylaxis    Augmentin [amoxicillin-pot clavulanate] Hives    Penicillins Hives    Tamiflu [oseltamivir] Diarrhea and Nausea And Vomiting       Family History   Problem Relation Age of Onset    Diabetes Mother     Heart disease Mother     Thyroid disease Mother     Depression Mother     Asthma Mother     Arthritis Mother     Allergic rhinitis Mother     Colon polyps Mother     Depression Father     Migraines Father     Thyroid disease Father     PAVEL disease Father     Early death Brother       1 hour after birth    Thyroid disease Maternal Grandmother     Seizures  Maternal Grandmother     Clotting disorder Maternal Grandmother     Pulmonary fibrosis Maternal Grandfather     Colon cancer Maternal Grandfather     Diabetes Paternal Grandmother     Arthritis Paternal Grandmother     Schizophrenia Paternal Grandmother     Depression Paternal Grandmother     Early death Paternal Grandfather 54    Aneurysm Paternal Grandfather     Angioedema Neg Hx     Immunodeficiency Neg Hx     Urticaria Neg Hx     Collagen disease Neg Hx        Social History   Substance Use Topics    Smoking status: Former Smoker     Quit date: 2014    Smokeless tobacco: Never Used    Alcohol use No       OB History    Para Term  AB Living   1       1     SAB TAB Ectopic Multiple Live Births   1              # Outcome Date GA Lbr Luis/2nd Weight Sex Delivery Anes PTL Lv   1 SAB                   Review of Symptoms:  GENERAL: Denies weight gain or weight loss. Feeling well overall.   SKIN: Denies rash or lesions.   HEAD: Denies head injury or headache.   NODES: Denies enlarged lymph nodes.   CHEST: Denies chest pain or shortness of breath.   CARDIOVASCULAR: Denies palpitations or left sided chest pain.   ABDOMEN: No abdominal pain, constipation, diarrhea, nausea, vomiting or rectal bleeding.   URINARY: No frequency, dysuria, hematuria, or burning on urination.  HEMATOLOGIC: No easy bruisability or excessive bleeding.   MUSCULOSKELETAL: Denies joint pain or swelling.     Wt 80 kg (176 lb 5.9 oz)   LMP 10/09/2017   Physical Exam:  APPEARANCE: Well nourished, well developed, in no acute distress.  SKIN: Normal skin turgor, no lesions.  NECK: Neck symmetric without masses   RESPIRATORY: Normal respiratory effort with no retractions or use of accessory muscles  CARDIOVASCULAR: Peripheral vascular system with no swelling no varicosities and palpation of pulses normal  LYMPHATIC: No enlargements of the lymph nodes noted in the neck, axillae, or groin  ABDOMEN: Soft. No tenderness  or masses. No hepatosplenomegaly. No hernias.PELVIC: Normal external female genitalia without lesions. Normal hair distribution. Adequate perineal body, normal urethral meatus. Urethra with no masses.  Bladder nontender. Vagina moist and well rugated without lesions or discharge. Cervix pink and without lesions. No significant cystocele or rectocele. Bimanual exam showed uterus normal size, shape, position, mobile and nontender. Adnexa without masses or tenderness. Urethra and bladder normal.  EXTREMITIES: No clubbing cyanosis or edema.    ASSESSMENT/PLAN:  Vaginal discharge  -     Vaginosis Screen by DNA Probe      15 minutes spent today with this patient. Greater than half spent in counseling today.

## 2017-11-01 LAB
CANDIDA RRNA VAG QL PROBE: NEGATIVE
G VAGINALIS RRNA GENITAL QL PROBE: NEGATIVE
T VAGINALIS RRNA GENITAL QL PROBE: NEGATIVE

## 2017-11-06 ENCOUNTER — TELEPHONE (OUTPATIENT)
Dept: OBSTETRICS AND GYNECOLOGY | Facility: CLINIC | Age: 27
End: 2017-11-06

## 2017-11-06 NOTE — TELEPHONE ENCOUNTER
----- Message from Kathy Esteban sent at 11/6/2017 10:05 AM CST -----  Contact: self  Patient called asking for advice about brown discharge.  Please call patient at [phone]. Thanks!

## 2017-11-22 ENCOUNTER — PATIENT MESSAGE (OUTPATIENT)
Dept: GASTROENTEROLOGY | Facility: CLINIC | Age: 27
End: 2017-11-22

## 2017-11-28 ENCOUNTER — TELEPHONE (OUTPATIENT)
Dept: GASTROENTEROLOGY | Facility: CLINIC | Age: 27
End: 2017-11-28

## 2017-11-28 NOTE — TELEPHONE ENCOUNTER
----- Message from Tash Daniel sent at 11/28/2017  2:53 PM CST -----  Contact: self  Patient called regarding message received to reschedule for original appt on 12/8. Next available on 5/11/18. Wanted to be seen sooner, has Medicaid. Please contact [phone] (home)

## 2017-11-30 ENCOUNTER — OFFICE VISIT (OUTPATIENT)
Dept: FAMILY MEDICINE | Facility: CLINIC | Age: 27
End: 2017-11-30
Payer: MEDICAID

## 2017-11-30 ENCOUNTER — TELEPHONE (OUTPATIENT)
Dept: FAMILY MEDICINE | Facility: CLINIC | Age: 27
End: 2017-11-30

## 2017-11-30 VITALS
TEMPERATURE: 98 F | BODY MASS INDEX: 34.32 KG/M2 | HEART RATE: 72 BPM | DIASTOLIC BLOOD PRESSURE: 82 MMHG | RESPIRATION RATE: 16 BRPM | SYSTOLIC BLOOD PRESSURE: 118 MMHG | HEIGHT: 60 IN | WEIGHT: 174.81 LBS

## 2017-11-30 DIAGNOSIS — J32.9 SINUSITIS, UNSPECIFIED CHRONICITY, UNSPECIFIED LOCATION: Primary | ICD-10-CM

## 2017-11-30 DIAGNOSIS — J45.909 ASTHMA, UNSPECIFIED ASTHMA SEVERITY, UNSPECIFIED WHETHER COMPLICATED, UNSPECIFIED WHETHER PERSISTENT: ICD-10-CM

## 2017-11-30 PROCEDURE — 99214 OFFICE O/P EST MOD 30 MIN: CPT | Mod: S$GLB,,, | Performed by: NURSE PRACTITIONER

## 2017-11-30 RX ORDER — ALBUTEROL SULFATE 0.83 MG/ML
2.5 SOLUTION RESPIRATORY (INHALATION) EVERY 6 HOURS PRN
Qty: 1 BOX | Refills: 1 | Status: SHIPPED | OUTPATIENT
Start: 2017-11-30 | End: 2018-11-30

## 2017-11-30 RX ORDER — DOXYCYCLINE 100 MG/1
100 CAPSULE ORAL 2 TIMES DAILY
Qty: 14 CAPSULE | Refills: 0 | Status: SHIPPED | OUTPATIENT
Start: 2017-11-30 | End: 2017-12-07

## 2017-11-30 NOTE — PROGRESS NOTES
Subjective:       Patient ID: Carlos Coulter is a 26 y.o. female.    Chief Complaint: URI    URI    This is a new problem. Episode onset: one week. The problem has been unchanged. The maximum temperature recorded prior to her arrival was 101 - 101.9 F. The fever has been present for 1 to 2 days. Associated symptoms include congestion, coughing, ear pain, headaches, a plugged ear sensation, rhinorrhea, sinus pain, sneezing, a sore throat and swollen glands. Pertinent negatives include no abdominal pain, chest pain, diarrhea, dysuria, joint pain, joint swelling, nausea, neck pain, rash, vomiting or wheezing. Associated symptoms comments: Nagging cough, hx asthma, no wheezing . She has tried inhaler use and antihistamine for the symptoms. The treatment provided no relief.     Review of Systems   Constitutional: Positive for appetite change and fatigue.   HENT: Positive for congestion, ear pain, postnasal drip, rhinorrhea, sinus pain, sinus pressure, sneezing and sore throat.    Eyes: Negative for pain and redness.   Respiratory: Positive for cough. Negative for choking, chest tightness, shortness of breath and wheezing.    Cardiovascular: Negative for chest pain and palpitations.   Gastrointestinal: Negative for abdominal distention, abdominal pain, constipation, diarrhea, nausea and vomiting.   Endocrine: Negative for polydipsia and polyphagia.   Genitourinary: Negative for dysuria and hematuria.   Musculoskeletal: Negative for arthralgias, joint pain, joint swelling, myalgias and neck pain.   Skin: Negative for color change, pallor and rash.   Neurological: Positive for headaches. Negative for dizziness and light-headedness.       Objective:       Vitals:    11/30/17 1040   BP: 118/82   Pulse: 72   Resp: 16   Temp: 98.4 °F (36.9 °C)   TempSrc: Oral   Weight: 79.3 kg (174 lb 13.2 oz)   Height: 5' (1.524 m)   PainSc:   4   PainLoc: Chest       Physical Exam   Constitutional: She is oriented to person,  place, and time. She appears well-developed.   Obese female, poor dentition   HENT:   Head: Normocephalic and atraumatic.   Right Ear: Hearing, tympanic membrane, external ear and ear canal normal.   Left Ear: Hearing, tympanic membrane, external ear and ear canal normal.   Nose: Mucosal edema and rhinorrhea present. No nose lacerations or sinus tenderness. Right sinus exhibits maxillary sinus tenderness and frontal sinus tenderness. Left sinus exhibits maxillary sinus tenderness and frontal sinus tenderness.   Mouth/Throat: Uvula is midline and mucous membranes are normal. Posterior oropharyngeal edema and posterior oropharyngeal erythema present.   Eyes: Conjunctivae are normal. Right eye exhibits no discharge. Left eye exhibits no discharge. No scleral icterus.   Neck: Normal range of motion. Neck supple. No tracheal deviation present.   Cardiovascular: Normal rate and regular rhythm.    No murmur heard.  Pulmonary/Chest: Effort normal and breath sounds normal. No stridor. No respiratory distress. She has no wheezes. She has no rales. She exhibits no tenderness.   Musculoskeletal: Normal range of motion.   Lymphadenopathy:     She has cervical adenopathy.   Neurological: She is alert and oriented to person, place, and time.   Skin: Skin is warm and dry.   Psychiatric: She has a normal mood and affect. Her behavior is normal. Judgment and thought content normal.       Assessment:       1. Sinusitis, unspecified chronicity, unspecified location    2. Asthma, unspecified asthma severity, unspecified whether complicated, unspecified whether persistent        Plan:       Carlos was seen today for uri.    Diagnoses and all orders for this visit:    Sinusitis, unspecified chronicity, unspecified location  -     doxycycline (VIBRAMYCIN) 100 MG Cap; Take 1 capsule (100 mg total) by mouth 2 (two) times daily.    Asthma, unspecified asthma severity, unspecified whether complicated, unspecified whether persistent    IF  WHEEZING/SOB CAN USE NEBS  -     albuterol (PROVENTIL) 2.5 mg /3 mL (0.083 %) nebulizer solution; Take 3 mLs (2.5 mg total) by nebulization every 6 (six) hours as needed for Wheezing. Rescue

## 2017-12-04 ENCOUNTER — PATIENT MESSAGE (OUTPATIENT)
Dept: GASTROENTEROLOGY | Facility: CLINIC | Age: 27
End: 2017-12-04

## 2018-01-05 ENCOUNTER — TELEPHONE (OUTPATIENT)
Dept: FAMILY MEDICINE | Facility: CLINIC | Age: 28
End: 2018-01-05

## 2018-01-05 NOTE — TELEPHONE ENCOUNTER
----- Message from Pham Rodriguez sent at 1/4/2018  3:40 PM CST -----  [phone] pt asking to be seen tomorrow.. Has headaches

## 2018-01-05 NOTE — TELEPHONE ENCOUNTER
Pt also experiencing body chills along with headaches.   No available appts for today, suggested the urgent care off of [address], states she will go be seen there.

## 2018-01-11 ENCOUNTER — INITIAL CONSULT (OUTPATIENT)
Dept: GASTROENTEROLOGY | Facility: CLINIC | Age: 28
End: 2018-01-11
Payer: MEDICAID

## 2018-01-11 ENCOUNTER — HOSPITAL ENCOUNTER (OUTPATIENT)
Dept: RADIOLOGY | Facility: HOSPITAL | Age: 28
Discharge: HOME | End: 2018-01-11
Attending: NURSE PRACTITIONER
Payer: MEDICAID

## 2018-01-11 VITALS
WEIGHT: 175.81 LBS | HEART RATE: 69 BPM | HEIGHT: 60 IN | DIASTOLIC BLOOD PRESSURE: 66 MMHG | BODY MASS INDEX: 34.52 KG/M2 | SYSTOLIC BLOOD PRESSURE: 99 MMHG

## 2018-01-11 DIAGNOSIS — K59.09 CHRONIC CONSTIPATION: ICD-10-CM

## 2018-01-11 DIAGNOSIS — K21.00 GASTROESOPHAGEAL REFLUX DISEASE WITH ESOPHAGITIS: ICD-10-CM

## 2018-01-11 DIAGNOSIS — R05.9 COUGH: ICD-10-CM

## 2018-01-11 DIAGNOSIS — K59.09 CHRONIC CONSTIPATION: Primary | ICD-10-CM

## 2018-01-11 PROCEDURE — 74019 RADEX ABDOMEN 2 VIEWS: CPT | Mod: 26,,, | Performed by: RADIOLOGY

## 2018-01-11 PROCEDURE — 74019 RADEX ABDOMEN 2 VIEWS: CPT | Mod: TC,FY,PO

## 2018-01-11 PROCEDURE — 99999 PR PBB SHADOW E&M-EST. PATIENT-LVL IV: CPT | Mod: PBBFAC,,, | Performed by: NURSE PRACTITIONER

## 2018-01-11 PROCEDURE — 99214 OFFICE O/P EST MOD 30 MIN: CPT | Mod: S$PBB,,, | Performed by: NURSE PRACTITIONER

## 2018-01-11 PROCEDURE — 99214 OFFICE O/P EST MOD 30 MIN: CPT | Mod: PBBFAC,PO | Performed by: NURSE PRACTITIONER

## 2018-01-11 RX ORDER — POLYETHYLENE GLYCOL 3350 17 G/17G
17 POWDER, FOR SOLUTION ORAL DAILY
Qty: 510 G | Refills: 2 | Status: SHIPPED | OUTPATIENT
Start: 2018-01-11 | End: 2018-02-10

## 2018-01-11 NOTE — PATIENT INSTRUCTIONS
Constipation (Adult)  Constipation means that you have bowel movements that are less frequent than usual. Stools often become very hard and difficult to pass.  Constipation is very common. At some point in life it affects almost everyone. Since everyone's bowel habits are different, what is constipation to one person may not be to another. Your healthcare provider may do tests to diagnose constipation. It depends on what he or she finds when evaluating you.    Symptoms of constipation include:  · Abdominal pain  · Bloating  · Vomiting  · Painful bowel movements  · Itching, swelling, bleeding, or pain around the anus  Causes  Constipation can have many causes. These include:  · Diet low in fiber  · Too much dairy  · Not drinking enough liquids  · Lack of exercise or physical activity. This is especially true for older adults.  · Changes in lifestyle or daily routine, including pregnancy, aging, work, and travel  · Frequent use or misuse of laxatives  · Ignoring the urge to have a bowel movement or delaying it until later  · Medicines, such as certain prescription pain medicines, iron supplements, antacids, certain antidepressants, and calcium supplements  · Diseases like irritable bowel syndrome, bowel obstructions, stroke, diabetes, thyroid disease, Parkinson disease, hemorrhoids, and colon cancer  Complications  Potential complications of constipation can include:  · Hemorrhoids  · Rectal bleeding from hemorrhoids or anal fissures (skin tears)  · Hernias  · Dependency on laxatives  · Chronic constipation  · Fecal impaction  · Bowel obstruction or perforation  Home care  All treatment should be done after talking with your healthcare provider. This is especially true if you have another medical problems, are taking prescription medicines, or are an older adult. Treatment most often involves lifestyle changes. You may also need medicines. Your healthcare provider will tell you which will work best for you. Follow  the advice below to help avoid this problem in the future.  Lifestyle changes  These lifestyle changes can help prevent constipation:  · Diet. Eat a high-fiber diet, with fresh fruit and vegetables, and reduce dairy intake, meats, and processed foods  · Fluids. It's important to get enough fluids each day. Drink plenty of water when you eat more fiber. If you are on diet that limits the amount of fluid you can have, talk about this with your healthcare provider.  · Regular exercise. Check with your healthcare provider first.  Medications  Take any medicines as directed. Some laxatives are safe to use only every now and then. Others can be taken on a regular basis. Talk with your doctor or pharmacist if you have questions.  Prescription pain medicines can cause constipation. If you are taking this kind of medicine, ask your healthcare provider if you should also take a stool softener.  Medicines you may take to treat constipation include:  · Fiber supplements  · Stool softeners  · Laxatives  · Enemas  · Rectal suppositories  Follow-up care  Follow up with your healthcare provider if symptoms don't get better in the next few days. You may need to have more tests or see a specialist.  Call 911  Call 911 if any of these occur:  · Trouble breathing  · Stiff, rigid abdomen that is severely painful to touch  · Confusion  · Fainting or loss of consciousness  · Rapid heart rate  · Chest pain  When to seek medical advice  Call your healthcare provider right away if any of these occur:  · Fever over 100.4°F (38°C)  · Failure to resume normal bowel movements  · Pain in your abdomen or back gets worse  · Nausea or vomiting  · Swelling in your abdomen  · Blood in the stool  · Black, tarry stool  · Involuntary weight loss  · Weakness  Date Last Reviewed: 12/30/2015  © 9559-0288 [employer]. [address] [place] [address], [place] [address]. All rights reserved. This information is not intended as a substitute for  professional medical care. Always follow your healthcare professional's instructions.

## 2018-01-11 NOTE — PROGRESS NOTES
Subjective:       Patient ID: Carlos Coulter is a 27 y.o. female Body mass index is 34.33 kg/m².    Chief Complaint: Constipation (severe)    Established patient of Dr. Nguyen & myself.    Gastroesophageal Reflux   She complains of coughing (occasional, productive with clear mucus- had URI a few weeks ago), heartburn and water brash. She reports no abdominal pain, no belching, no chest pain, no choking, no dysphagia, no globus sensation (resolved), no hoarse voice, no nausea or no sore throat. This is a chronic problem. Episode onset: several years. The problem occurs rarely. Progression since onset: controlled with diet and lifestyle modifications. The heartburn wakes her from sleep. The symptoms are aggravated by certain foods and caffeine (high fat food). Pertinent negatives include no fatigue, melena or weight loss. Risk factors include obesity and caffeine use. She has tried a histamine-2 antagonist, a diet change and ETOH reduction (zantac 300 mg PRN; diet change; PAST: carafate- helped, protonix 40 mg) for the symptoms. The treatment provided significant relief. Past procedures include an EGD and a UGI.   Constipation   This is a chronic problem. Episode onset: since childhood. The problem has been gradually worsening (over the past week) since onset. Her stool frequency is 1 time per week or less. The stool is described as pellet like and firm (straining with bowel movements). The patient is on a high fiber diet. She exercises regularly. There has not been adequate water intake. Associated symptoms include bloating and flatus. Pertinent negatives include no abdominal pain, diarrhea, fever, hematochezia, melena, nausea, rectal pain, vomiting or weight loss. Risk factors include obesity. She has tried stool softeners (PAST: colace) for the symptoms. Her past medical history is significant for endocrine disease (taking synthroid). There is no history of inflammatory bowel disease or irritable  bowel syndrome.     Review of Systems   Constitutional: Negative for appetite change, chills, fatigue, fever, unexpected weight change and weight loss.   HENT: Negative for hoarse voice, sore throat and trouble swallowing.    Eyes: Negative for visual disturbance.   Respiratory: Positive for cough (occasional, productive with clear mucus- had URI a few weeks ago). Negative for choking, chest tightness and shortness of breath.    Cardiovascular: Negative for chest pain and palpitations.   Gastrointestinal: Positive for bloating, constipation, flatus and heartburn. Negative for abdominal pain, anal bleeding, blood in stool, diarrhea, dysphagia, hematochezia, melena, nausea, rectal pain and vomiting.   Neurological: Negative for weakness and headaches.       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; casted B    Bipolar disorder     sees psych    Chronic constipation     Depression     sees psych    Eczema     GERD (gastroesophageal reflux disease)     Growth hormone deficiency     tx with growth hormone ages 12 - 15 by Dr Brewer    Heart murmur     saw cardiology 9/2014, intermittent, asymptomatic    Hemorrhoid     Hypothyroidism     sees Dr Brewer (endo)    Insomnia     Learning disability     NOLAN (obstructive sleep apnea)     does not use CPap    Speech abnormality      Past Surgical History:   Procedure Laterality Date    ADENOIDECTOMY  age 4    FOOT SURGERY  11/13/2014. 3/2015    L foot    FOOT SURGERY Right 10/2014    Right foot surgery    KNEE SURGERY  right/11/2015    KNEE SURGERY Right 03/09/2017    injury to right knee    MULTIPLE TOOTH EXTRACTIONS      MUSCLE RELEASE Bilateral     Lower extremities/ calf due to congenital club feet    TYMPANOSTOMY TUBE PLACEMENT  age 4    UPPER  GASTROINTESTINAL ENDOSCOPY  2016    Dr. Nguyen    UPPER GASTROINTESTINAL ENDOSCOPY  2017    Dr. Nguyen     Family History   Problem Relation Age of Onset    Diabetes Mother     Heart disease Mother     Thyroid disease Mother     Depression Mother     Asthma Mother     Arthritis Mother     Allergic rhinitis Mother     Colon polyps Mother     Diverticulitis Mother     Depression Father     Migraines Father     Thyroid disease Father     PAVEL disease Father     Early death Brother       1 hour after birth    Thyroid disease Maternal Grandmother     Seizures Maternal Grandmother     Clotting disorder Maternal Grandmother     Pulmonary fibrosis Maternal Grandfather     Colon cancer Maternal Grandfather     Diabetes Paternal Grandmother     Arthritis Paternal Grandmother     Schizophrenia Paternal Grandmother     Depression Paternal Grandmother     Early death Paternal Grandfather 54    Aneurysm Paternal Grandfather     Crohn's disease Maternal Cousin     Angioedema Neg Hx     Immunodeficiency Neg Hx     Urticaria Neg Hx     Collagen disease Neg Hx     Ulcerative colitis Neg Hx     Celiac disease Neg Hx      Wt Readings from Last 10 Encounters:   18 79.7 kg (175 lb 12.8 oz)   17 79.3 kg (174 lb 13.2 oz)   10/31/17 80 kg (176 lb 5.9 oz)   10/24/17 79.4 kg (175 lb)   10/05/17 79.4 kg (175 lb 0.7 oz)   17 77.2 kg (170 lb 3.1 oz)   17 77.3 kg (170 lb 6.7 oz)   17 77.6 kg (171 lb 1.2 oz)   17 77.6 kg (171 lb 1.2 oz)   17 74.8 kg (165 lb)     Lab Results   Component Value Date    WBC 5.03 2017    HGB 13.3 2017    HCT 40.0 2017    MCV 86 2017     2017     CMP  Sodium   Date Value Ref Range Status   04/10/2017 135 (L) 136 - 145 mmol/L Final     Potassium   Date Value Ref Range Status   04/10/2017 4.1 3.5 - 5.1 mmol/L Final     Chloride   Date Value Ref Range Status   04/10/2017 102 95 - 110 mmol/L Final      CO2   Date Value Ref Range Status   04/10/2017 21 (L) 23 - 29 mmol/L Final     Glucose   Date Value Ref Range Status   04/10/2017 86 70 - 110 mg/dL Final     BUN, Bld   Date Value Ref Range Status   04/10/2017 10 6 - 20 mg/dL Final     Creatinine   Date Value Ref Range Status   04/10/2017 0.8 0.5 - 1.4 mg/dL Final     Calcium   Date Value Ref Range Status   04/10/2017 9.6 8.7 - 10.5 mg/dL Final     Total Protein   Date Value Ref Range Status   04/10/2017 7.1 6.0 - 8.4 g/dL Final     Albumin   Date Value Ref Range Status   04/10/2017 3.3 (L) 3.5 - 5.2 g/dL Final     Total Bilirubin   Date Value Ref Range Status   04/10/2017 0.4 0.1 - 1.0 mg/dL Final     Comment:     For infants and newborns, interpretation of results should be based  on gestational age, weight and in agreement with clinical  observations.  Premature Infant recommended reference ranges:  Up to 24 hours.............<8.0 mg/dL  Up to 48 hours............<12.0 mg/dL  3-5 days..................<15.0 mg/dL  6-29 days.................<15.0 mg/dL       Alkaline Phosphatase   Date Value Ref Range Status   04/10/2017 122 55 - 135 U/L Final     AST   Date Value Ref Range Status   04/10/2017 18 10 - 40 U/L Final     ALT   Date Value Ref Range Status   04/10/2017 15 10 - 44 U/L Final     Anion Gap   Date Value Ref Range Status   04/10/2017 12 8 - 16 mmol/L Final     eGFR if    Date Value Ref Range Status   04/10/2017 >60.0 >60 mL/min/1.73 m^2 Final     eGFR if non    Date Value Ref Range Status   04/10/2017 >60.0 >60 mL/min/1.73 m^2 Final     Comment:     Calculation used to obtain the estimated glomerular filtration  rate (eGFR) is the CKD-EPI equation. Since race is unknown   in our information system, the eGFR values for   -American and Non--American patients are given   for each creatinine result.       Lab Results   Component Value Date    TSH 2.131 08/22/2017     Reviewed prior medical records including  "radiology report of 2/7/17 UGI & endoscopy history (see surgical history).    9/5/17 EGD was reviewed and procedure report states:   " Findings:       Savary-Roque Grade II (multiple lesions and folds,        noncircumferential, with or without confluence) distal reflux        esophagitis with no bleeding was found. Biopsies were taken with a        cold forceps for histology.       A mild Schatzki ring (acquired) was found at the gastroesophageal        junction. A guidewire was placed and the scope was withdrawn.        Dilation was performed with a Savary dilator with mild resistance at        54 Fr.       A small hiatal hernia was present.       The entire examined stomach was normal.       The examined duodenum was normal.  Impression:          - Savary-Roque Grade II reflux esophagitis.                        Biopsied.                       - Mild Schatzki ring. Dilated.                       - Small hiatal hernia.                       - Normal stomach.                       - Normal examined duodenum.  Recommendation:      - Await pathology results.                       - Continue present medications.                       - Discharge patient to home (ambulatory).".  Biopsy results:   "FINAL PATHOLOGIC DIAGNOSIS  BIOPSY OF DISTAL ESOPHAGUS:  INNOCUOUS SQUAMOUS MUCOSA WITH EQUIVOCAL BASAL LAYER HYPERPLASIA  NO EOSINOPHILIC INFILTRATE IDENTIFIED  NO GLANDULAR MUCOSA IDENTIFIED."  Objective:      Physical Exam   Constitutional: She is oriented to person, place, and time. She appears well-developed and well-nourished. No distress.   HENT:   Mouth/Throat: Oropharynx is clear and moist and mucous membranes are normal. No oral lesions. No oropharyngeal exudate.   Eyes: Conjunctivae are normal. No scleral icterus.   Cardiovascular: Normal rate.    Pulmonary/Chest: Effort normal and breath sounds normal. No respiratory distress. She has no wheezes.   Abdominal: Soft. Normal appearance and bowel sounds are normal. " She exhibits no distension, no abdominal bruit, no ascites and no mass. There is no hepatosplenomegaly. There is no tenderness. There is no rigidity, no rebound, no guarding, no tenderness at McBurney's point and negative Venegas's sign. No hernia.   Neurological: She is alert and oriented to person, place, and time.   Skin: Skin is warm and dry. No rash noted. She is not diaphoretic. No erythema. No pallor.   Non-jaundiced   Psychiatric: She has a normal mood and affect. Her behavior is normal.   Nursing note and vitals reviewed.      Assessment:       1. Chronic constipation    2. Gastroesophageal reflux disease with esophagitis    3. Cough        Plan:       Chronic constipation  -     X-Ray Abdomen Flat And Erect; Future; Expected date: 01/11/2018  -  START   polyethylene glycol (GLYCOLAX) 17 gram/dose powder; Take 17 g by mouth once daily. Mix with 8 oz of water/liquid.  Dispense: 510 g; Refill: 2  Recommended daily exercise as tolerated, adequate water intake (six 8-oz glasses of water daily), and high fiber diet. OTC fiber supplements are recommended if diet does not reach daily fiber goal (25 grams daily), such as Metamucil, Citrucel, or FiberCon (take as directed, separate from other oral medications by >2 hours).  -Recommend taking an OTC stool softener such as Colace as directed to avoid hard stools and straining with bowel movements PRN  - If no improvement with above recommendations, try intermittently dosed Dulcolax OTC as directed (every 3-4  days) PRN to facilitate bowel movements  -If still no improvement with these measures, call/follow-up  - schedule Colonoscopy, discussed procedure with the patient, patient verbalized understanding    Gastroesophageal reflux disease with esophagitis  -   Continue zantac 300 mg once daily PRN, discussed about possible long term use of medication (prefer to use lowest effective dose or discontinuing if possible), pt verbalized understanding & agreed with management  plan  -continue lifestyle modifications to help control reflux including: avoid large meals, avoid eating within 2-3 hours of bedtime (avoid late night eating & lying down soon after eating), elevate head of bed if nocturnal symptoms are present, smoking cessation (if current smoker), & weight loss (if overweight).   - avoid known foods which trigger reflux symptoms & to minimize/avoid high-fat foods, chocolate, caffeine, citrus, alcohol, & tomato products.  - avoid/limit use of NSAID's, since they can cause GI upset, bleeding, and/or ulcers. If needed, take with food.    Cough  Recommend follow-up with Primary Care Provider for continued evaluation and management.    Return in about 1 month (around 2/11/2018), or if symptoms worsen or fail to improve.    If no improvement in symptoms or symptoms worsen, call/follow-up at clinic or go to ER.

## 2018-01-17 ENCOUNTER — HOSPITAL ENCOUNTER (OUTPATIENT)
Facility: HOSPITAL | Age: 28
Discharge: HOME | End: 2018-01-17
Attending: INTERNAL MEDICINE | Admitting: INTERNAL MEDICINE
Payer: MEDICAID

## 2018-01-17 ENCOUNTER — SURGERY (OUTPATIENT)
Age: 28
End: 2018-01-17

## 2018-01-17 ENCOUNTER — ANESTHESIA EVENT (OUTPATIENT)
Dept: ENDOSCOPY | Facility: HOSPITAL | Age: 28
End: 2018-01-17
Payer: MEDICAID

## 2018-01-17 ENCOUNTER — ANESTHESIA (OUTPATIENT)
Dept: ENDOSCOPY | Facility: HOSPITAL | Age: 28
End: 2018-01-17
Payer: MEDICAID

## 2018-01-17 VITALS
BODY MASS INDEX: 34.16 KG/M2 | WEIGHT: 174 LBS | SYSTOLIC BLOOD PRESSURE: 114 MMHG | HEART RATE: 72 BPM | OXYGEN SATURATION: 98 % | RESPIRATION RATE: 18 BRPM | HEIGHT: 60 IN | TEMPERATURE: 98 F | DIASTOLIC BLOOD PRESSURE: 62 MMHG

## 2018-01-17 DIAGNOSIS — R19.4 CHANGE IN BOWEL HABITS: ICD-10-CM

## 2018-01-17 LAB
B-HCG UR QL: NEGATIVE
CTP QC/QA: YES

## 2018-01-17 PROCEDURE — D9220A PRA ANESTHESIA: Mod: CRNA,,, | Performed by: NURSE ANESTHETIST, CERTIFIED REGISTERED

## 2018-01-17 PROCEDURE — 63600175 PHARM REV CODE 636 W HCPCS: Mod: PO | Performed by: NURSE ANESTHETIST, CERTIFIED REGISTERED

## 2018-01-17 PROCEDURE — 45378 DIAGNOSTIC COLONOSCOPY: CPT | Mod: ,,, | Performed by: INTERNAL MEDICINE

## 2018-01-17 PROCEDURE — D9220A PRA ANESTHESIA: Mod: ANES,,, | Performed by: ANESTHESIOLOGY

## 2018-01-17 PROCEDURE — 37000009 HC ANESTHESIA EA ADD 15 MINS: Mod: PO | Performed by: INTERNAL MEDICINE

## 2018-01-17 PROCEDURE — 37000008 HC ANESTHESIA 1ST 15 MINUTES: Mod: PO | Performed by: INTERNAL MEDICINE

## 2018-01-17 PROCEDURE — 81025 URINE PREGNANCY TEST: CPT | Mod: PO | Performed by: INTERNAL MEDICINE

## 2018-01-17 PROCEDURE — 45378 DIAGNOSTIC COLONOSCOPY: CPT | Mod: PO | Performed by: INTERNAL MEDICINE

## 2018-01-17 RX ORDER — LIDOCAINE HCL/PF 100 MG/5ML
SYRINGE (ML) INTRAVENOUS
Status: DISCONTINUED | OUTPATIENT
Start: 2018-01-17 | End: 2018-01-17

## 2018-01-17 RX ORDER — SODIUM CHLORIDE, SODIUM LACTATE, POTASSIUM CHLORIDE, CALCIUM CHLORIDE 600; 310; 30; 20 MG/100ML; MG/100ML; MG/100ML; MG/100ML
INJECTION, SOLUTION INTRAVENOUS CONTINUOUS
Status: DISCONTINUED | OUTPATIENT
Start: 2018-01-17 | End: 2018-01-17 | Stop reason: HOSPADM

## 2018-01-17 RX ORDER — PROPOFOL 10 MG/ML
VIAL (ML) INTRAVENOUS
Status: DISCONTINUED | OUTPATIENT
Start: 2018-01-17 | End: 2018-01-17

## 2018-01-17 RX ADMIN — PROPOFOL 50 MG: 10 INJECTION, EMULSION INTRAVENOUS at 11:01

## 2018-01-17 RX ADMIN — PROPOFOL 150 MG: 10 INJECTION, EMULSION INTRAVENOUS at 11:01

## 2018-01-17 RX ADMIN — LIDOCAINE HYDROCHLORIDE 100 MG: 20 INJECTION, SOLUTION INTRAVENOUS at 11:01

## 2018-01-17 RX ADMIN — SODIUM CHLORIDE, SODIUM LACTATE, POTASSIUM CHLORIDE, CALCIUM CHLORIDE: 600; 310; 30; 20 INJECTION, SOLUTION INTRAVENOUS at 11:01

## 2018-01-17 NOTE — ANESTHESIA PREPROCEDURE EVALUATION
01/17/2018  Carlos Coulter is a 27 y.o., female.    Anesthesia Evaluation      I have reviewed the Medications.     Review of Systems  Anesthesia Hx:  Denies Hx of Anesthetic complications   Social:  Former smoker, quit 2014   EENT/Dental:   chronic allergic rhinitis   Cardiovascular:   Exercise tolerance: good    Pulmonary:   Asthma mild    Hepatic/GI:   GERD    Endocrine:   Hypothyroidism    Psych:   Psychiatric History anxiety depression Bipolar d/o, ADHD,Learning/speech disability         Physical Exam  General:  Obesity    Airway/Jaw/Neck:  Airway Findings: Mouth Opening: Normal Tongue: Normal  General Airway Assessment: Adult, Average  Mallampati: II  Jaw/Neck Findings:  Neck ROM: Normal ROM       Chest/Lungs:  Chest/Lungs Findings: Clear to auscultation, Normal Respiratory Rate     Heart/Vascular:  Heart Findings: Rate: Normal  Rhythm: Regular Rhythm  Sounds: Normal  Heart murmur: negative       Mental Status:  Mental Status Findings:  Cooperative, Alert and Oriented         Anesthesia Plan  Type of Anesthesia, risks & benefits discussed:  Anesthesia Type:  general  Patient's Preference:   Intra-op Monitoring Plan:   Intra-op Monitoring Plan Comments:   Post Op Pain Control Plan:   Post Op Pain Control Plan Comments:   Induction:   IV  Beta Blocker:  Patient is not currently on a Beta-Blocker (No further documentation required).       Informed Consent: Patient understands risks and agrees with Anesthesia plan.  Questions answered. Anesthesia consent signed with patient.  ASA Score: 3     Day of Surgery Review of History & Physical:        Anesthesia Plan Notes: Propofol general.        Ready For Surgery From Anesthesia Perspective.

## 2018-01-17 NOTE — TRANSFER OF CARE
Anesthesia Transfer of Care Note    Patient: Carlos Coulter    Procedure(s) Performed: Procedure(s) (LRB):  COLONOSCOPY (N/A)    Patient location: PACU    Anesthesia Type: general    Transport from OR: Transported from OR on room air with adequate spontaneous ventilation    Post pain: adequate analgesia    Post assessment: no apparent anesthetic complications    Post vital signs: stable    Level of consciousness: awake    Nausea/Vomiting: no nausea/vomiting    Complications: none    Transfer of care protocol was followed      Last vitals:   Visit Vitals  BP (!) 110/57 (BP Location: Right arm)   Pulse 65   Temp 36.6 °C (97.9 °F) (Skin)   Resp 18   Ht 5' (1.524 m)   Wt 78.9 kg (174 lb)   LMP 01/05/2018   SpO2 96%   BMI 33.98 kg/m²

## 2018-01-17 NOTE — H&P
History & Physical - Short Stay  Gastroenterology      SUBJECTIVE:     Procedure: Colonoscopy    Chief Complaint/Indication for Procedure: Change in Bowel Habits    No prescriptions prior to admission.       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; casted B    Bipolar disorder     sees psych    Chronic constipation     Depression     sees psych    Eczema     GERD (gastroesophageal reflux disease)     Growth hormone deficiency     tx with growth hormone ages 12 - 15 by Dr Brewer    Heart murmur     saw cardiology 9/2014, intermittent, asymptomatic    Hemorrhoid     Hypothyroidism     sees Dr Brewer (endo)    Insomnia     Learning disability     NOLAN (obstructive sleep apnea)     does not use CPap    Speech abnormality      Past Surgical History:   Procedure Laterality Date    ADENOIDECTOMY  age 4    FOOT SURGERY  11/13/2014. 3/2015    L foot    FOOT SURGERY Right 10/2014    Right foot surgery    KNEE SURGERY  right/11/2015    KNEE SURGERY Right 03/09/2017    injury to right knee    MULTIPLE TOOTH EXTRACTIONS      MUSCLE RELEASE Bilateral     Lower extremities/ calf due to congenital club feet    TYMPANOSTOMY TUBE PLACEMENT  age 4    UPPER GASTROINTESTINAL ENDOSCOPY  05/2016    Dr. Nguyen    UPPER GASTROINTESTINAL ENDOSCOPY  09/05/2017    Dr. Nguyen     Family History   Problem Relation Age of Onset    Diabetes Mother     Heart disease Mother     Thyroid disease Mother     Depression Mother     Asthma Mother     Arthritis  Mother     Allergic rhinitis Mother     Colon polyps Mother     Diverticulitis Mother     Depression Father     Migraines Father     Thyroid disease Father     PAVEL disease Father     Early death Brother       1 hour after birth    Thyroid disease Maternal Grandmother     Seizures Maternal Grandmother     Clotting disorder Maternal Grandmother     Pulmonary fibrosis Maternal Grandfather     Colon cancer Maternal Grandfather     Diabetes Paternal Grandmother     Arthritis Paternal Grandmother     Schizophrenia Paternal Grandmother     Depression Paternal Grandmother     Early death Paternal Grandfather 54    Aneurysm Paternal Grandfather     Crohn's disease Maternal Cousin     Angioedema Neg Hx     Immunodeficiency Neg Hx     Urticaria Neg Hx     Collagen disease Neg Hx     Ulcerative colitis Neg Hx     Celiac disease Neg Hx      Social History   Substance Use Topics    Smoking status: Former Smoker     Quit date: 2014    Smokeless tobacco: Never Used    Alcohol use No         OBJECTIVE:     Vital Signs (Most Recent)  Temp: 97.9 °F (36.6 °C) (18 1105)  Pulse: 65 (18 1105)  Resp: 18 (18 1105)  BP: (!) 110/57 (18 1105)  SpO2: 96 % (18 1105)    Physical Exam:                                                       GENERAL:  Comfortable, in no acute distress.                                 HEENT EXAM:  Nonicteric.  No adenopathy.  Oropharynx is clear.               NECK:  Supple.                                                               LUNGS:  Clear.                                                               CARDIAC:  Regular rate and rhythm.  S1, S2.  No murmur.                      ABDOMEN:  Soft, positive bowel sounds, nontender.  No hepatosplenomegaly or masses.  No rebound or guarding.                                             EXTREMITIES:  No edema.     MENTAL STATUS:  Normal, alert and oriented.      ASSESSMENT/PLAN:     Assessment:  Change in Bowel Habits    Plan: Colonoscopy    Anesthesia Plan: General    ASA Grade: ASA 2 - Patient with mild systemic disease with no functional limitations    MALLAMPATI SCORE:  I (soft palate, uvula, fauces, and tonsillar pillars visible)

## 2018-01-17 NOTE — ANESTHESIA POSTPROCEDURE EVALUATION
Anesthesia Post Evaluation    Patient: Carlos Coulter    Procedure(s) Performed: Procedure(s) (LRB):  COLONOSCOPY (N/A)    Final Anesthesia Type: general  Patient location during evaluation: PACU  Patient participation: Yes- Able to Participate  Level of consciousness: awake and alert and oriented  Post-procedure vital signs: reviewed and stable  Pain management: adequate  Airway patency: patent  PONV status at discharge: No PONV  Anesthetic complications: no      Cardiovascular status: hemodynamically stable and blood pressure returned to baseline  Respiratory status: unassisted, spontaneous ventilation and room air  Hydration status: euvolemic  Follow-up not needed.        Visit Vitals  /62 (BP Location: Left arm, Patient Position: Lying)   Pulse 72   Temp 36.6 °C (97.8 °F) (Skin)   Resp 18   Ht 5' (1.524 m)   Wt 78.9 kg (174 lb)   LMP 01/05/2018   SpO2 98%   BMI 33.98 kg/m²       Pain/Nae Score: Pain Assessment Performed: Yes (1/17/2018 12:16 PM)  Presence of Pain: denies (1/17/2018 12:16 PM)  Nae Score: 10 (1/17/2018 12:16 PM)

## 2018-01-17 NOTE — DISCHARGE INSTRUCTIONS
Recovery After Procedural Sedation (Adult)  You have been given medicine by vein to make you sleep during your surgery. This may have included both a pain medicine and sleeping medicine. Most of the effects have worn off. But you may still have some drowsiness for the next 6 to 8 hours.  Home care  Follow these guidelines when you get home:  · For the next 8 hours, you should be watched by a responsible adult. This person should make sure your condition is not getting worse.  · Don't drink any alcohol for the next 24 hours.  · Don't drive, operate dangerous machinery, or make important business or personal decisions during the next 24 hours.  Note: Your healthcare provider may tell you not to take any medicine by mouth for pain or sleep in the next 4 hours. These medicines may react with the medicines you were given in the hospital. This could cause a much stronger response than usual.  Follow-up care  Follow up with your healthcare provider if you are not alert and back to your usual level of activity within 12 hours.  When to seek medical advice  Call your healthcare provider right away if any of these occur:  · Drowsiness gets worse  · Weakness or dizziness gets worse  · Repeated vomiting  · You can't be awakened   Date Last Reviewed: 10/18/2016  © 8223-1540 [employer]. [address] [place] [address], [place] [address]. All rights reserved. This information is not intended as a substitute for professional medical care. Always follow your healthcare professional's instructions.

## 2018-01-17 NOTE — DISCHARGE SUMMARY
Discharge Note  Short Stay      SUMMARY     Admit Date: 1/17/2018    Attending Physician: Rubio Nguyen MD     Discharge Physician: Rubio Nguyen MD    Discharge Date: 1/17/2018 11:49 AM    Final Diagnosis: Chronic constipation [K59.09]    Disposition: HOME OR SELF CARE    Patient Instructions:   Current Discharge Medication List      CONTINUE these medications which have NOT CHANGED    Details   albuterol (PROAIR HFA) 90 mcg/actuation inhaler INHALE 1 PUFF EVERY 4 HOURS PRN for Wheezing  Qty: 8.5 g, Refills: 2      albuterol (PROVENTIL) 2.5 mg /3 mL (0.083 %) nebulizer solution Take 3 mLs (2.5 mg total) by nebulization every 6 (six) hours as needed for Wheezing. Rescue  Qty: 1 Box, Refills: 1      aripiprazole (ABILIFY) 5 MG Tab Take 5 mg by mouth once daily.      DILTIAZEM HCL (DILTIAZEM 2% CREAM) Apply topically 3 (three) times daily. Apply topically to anal area.  Qty: 30 g, Refills: 0      docusate sodium (COLACE) 100 MG capsule Take 1 capsule (100 mg total) by mouth 2 (two) times daily.  Qty: 60 capsule, Refills: 0    Associated Diagnoses: Knee effusion, right; New ACL tear, right, subsequent encounter; Acute pain of right knee; Chondromalacia of knee, right; Post-op pain; Acute medial meniscus tear, right, initial encounter      FLONASE ALLERGY RELIEF 50 mcg/actuation nasal spray SHAKE WELL AND USE 2 SPRAYS IN EACH NOSTRIL EVERY DAY  Qty: 1 Bottle, Refills: 11      inhalation device (BREATHERITE VALVED MDI SPACER) Use as directed for inhalation.  Qty: 1 Device, Refills: 0      levothyroxine (SYNTHROID) 88 MCG tablet TAKE 1 TABLET(88 MCG) BY MOUTH EVERY DAY  Qty: 30 tablet, Refills: 5      lisdexamfetamine (VYVANSE) 40 MG Cap Take 40 mg by mouth once daily.      montelukast (SINGULAIR) 10 mg tablet TAKE 1 TABLET(10 MG) BY MOUTH EVERY DAY  Qty: 90 tablet, Refills: 1      polyethylene glycol (GLYCOLAX) 17 gram/dose powder Take 17 g by mouth once daily. Mix with 8 oz of water/liquid.  Qty: 510 g,  Refills: 2    Associated Diagnoses: Chronic constipation      ranitidine (ZANTAC) 300 MG tablet Take 1 tablet (300 mg total) by mouth every evening.  Qty: 30 tablet, Refills: 2      trazodone (DESYREL) 100 MG tablet Take 100 mg by mouth nightly as needed for Insomnia.      triamcinolone acetonide 0.1% (KENALOG) 0.1 % ointment AAA bid x 2 weeks then prn, avoid chronic use  Qty: 454 g, Refills: 1    Associated Diagnoses: Atopic dermatitis      clobetasol (TEMOVATE) 0.05 % cream Apply 1 application topically daily as needed.              Discharge Procedure Orders (must include Diet, Follow-up, Activity)    Follow Up:  Follow up with PCP as previously scheduled  Resume routine diet.  Activity as tolerated.    No driving day of procedure.

## 2018-02-09 ENCOUNTER — PATIENT MESSAGE (OUTPATIENT)
Dept: FAMILY MEDICINE | Facility: CLINIC | Age: 28
End: 2018-02-09

## 2018-02-14 ENCOUNTER — OFFICE VISIT (OUTPATIENT)
Dept: FAMILY MEDICINE | Facility: CLINIC | Age: 28
End: 2018-02-14
Payer: MEDICAID

## 2018-02-14 VITALS
SYSTOLIC BLOOD PRESSURE: 104 MMHG | TEMPERATURE: 99 F | HEIGHT: 60 IN | BODY MASS INDEX: 32.96 KG/M2 | WEIGHT: 167.88 LBS | DIASTOLIC BLOOD PRESSURE: 64 MMHG | RESPIRATION RATE: 18 BRPM | HEART RATE: 72 BPM

## 2018-02-14 DIAGNOSIS — J45.901 EXACERBATION OF ASTHMA, UNSPECIFIED ASTHMA SEVERITY, UNSPECIFIED WHETHER PERSISTENT: ICD-10-CM

## 2018-02-14 DIAGNOSIS — J06.9 VIRAL URI: Primary | ICD-10-CM

## 2018-02-14 DIAGNOSIS — R51.9 NONINTRACTABLE HEADACHE, UNSPECIFIED CHRONICITY PATTERN, UNSPECIFIED HEADACHE TYPE: ICD-10-CM

## 2018-02-14 PROCEDURE — 3008F BODY MASS INDEX DOCD: CPT | Mod: S$GLB,,, | Performed by: NURSE PRACTITIONER

## 2018-02-14 PROCEDURE — 99214 OFFICE O/P EST MOD 30 MIN: CPT | Mod: S$GLB,,, | Performed by: NURSE PRACTITIONER

## 2018-02-14 RX ORDER — PREDNISONE 20 MG/1
TABLET ORAL
Qty: 18 TABLET | Refills: 0 | Status: SHIPPED | OUTPATIENT
Start: 2018-02-14 | End: 2018-03-02

## 2018-02-14 NOTE — PROGRESS NOTES
Subjective:       Patient ID: Carlos Coulter is a 27 y.o. female.    Chief Complaint: URI    .     URI    This is a new problem. Episode onset: one week. The problem has been unchanged. There has been no fever. Associated symptoms include congestion, coughing, headaches, a plugged ear sensation, rhinorrhea, sneezing, a sore throat, swollen glands and wheezing. Pertinent negatives include no abdominal pain, chest pain, diarrhea, dysuria, ear pain, joint pain, joint swelling, nausea, neck pain, sinus pain or vomiting. Associated symptoms comments: The patient has been having a headache almost daily for the past week.  She often rates his headache a 10 out of 10.  She is extremely worried because her father has migraine headaches and she is scared to start having migraine headaches.  Her headaches have been slightly light-sensitive and she has had slight nausea associated with the headaches. They are predominantly frontal and top of her head.. She has tried NSAIDs (She was scared to take OTC medications secondary to serious ALLERGIES to medications.) for the symptoms. The treatment provided mild relief.     Review of Systems   Constitutional: Positive for appetite change and fatigue.   HENT: Positive for congestion, postnasal drip, rhinorrhea, sneezing and sore throat. Negative for ear pain and sinus pain.    Eyes: Negative for pain and redness.   Respiratory: Positive for cough, chest tightness and wheezing. Negative for choking and shortness of breath.    Cardiovascular: Negative for chest pain and palpitations.   Gastrointestinal: Negative for abdominal distention, abdominal pain, constipation, diarrhea, nausea and vomiting.   Endocrine: Negative for polydipsia and polyphagia.   Genitourinary: Negative for dysuria and hematuria.   Musculoskeletal: Negative for arthralgias, joint pain, joint swelling, myalgias and neck pain.   Skin: Negative for color change and pallor.   Neurological: Positive for  headaches. Negative for dizziness and light-headedness.       Objective:       Vitals:    02/14/18 0858   BP: 104/64   Pulse: 72   Resp: 18   Temp: 99.2 °F (37.3 °C)   TempSrc: Oral   Weight: 76.1 kg (167 lb 14.1 oz)   Height: 5' (1.524 m)   PainSc: 0-No pain       Physical Exam   Constitutional: She is oriented to person, place, and time. She appears well-developed and well-nourished.   HENT:   Head: Normocephalic and atraumatic.   Right Ear: External ear normal. Tympanic membrane mobility is abnormal.   Left Ear: External ear normal. Tympanic membrane mobility is abnormal.   Nose: Mucosal edema and rhinorrhea present.   Mouth/Throat: Posterior oropharyngeal edema present. No posterior oropharyngeal erythema.   Eyes: Conjunctivae are normal. Pupils are equal, round, and reactive to light.   Neck: Normal range of motion. Neck supple. No tracheal deviation present.   Cardiovascular: Normal rate and regular rhythm.  Exam reveals no gallop and no friction rub.    No murmur heard.  Pulmonary/Chest: Effort normal. No stridor. No respiratory distress. She has wheezes. She has no rales.   Mild inspiratory wheezing to the upper lobes that were cleared with cough.   Musculoskeletal: Normal range of motion.   Lymphadenopathy:     She has no cervical adenopathy.   Neurological: She is alert and oriented to person, place, and time.   Skin: Skin is warm and dry.   Psychiatric: She has a normal mood and affect. Her behavior is normal. Judgment and thought content normal.       Assessment:       1. Viral URI    2. Exacerbation of asthma, unspecified asthma severity, unspecified whether persistent    3. Nonintractable headache, unspecified chronicity pattern, unspecified headache type        Plan:       Carlos was seen today for uri.    Diagnoses and all orders for this visit:    Viral URI  Continue symptomatic treatment.  I told the patient she can certainly use nasal saline as well as Robitussin for cough.    Exacerbation of  asthma, unspecified asthma severity, unspecified whether persistent  -     predniSONE (DELTASONE) 20 MG tablet; Take 3 tablets daily for 3 days, then 2 tablets daily for 3 days, then 1 tablet daily for 3 days.  Continue albuterol nebulizers at home.    Nonintractable headache, unspecified chronicity pattern, unspecified headache type  I am suspicious that this could be related to her current viral URI.  I would like her to follow up with me in 10 days if her headaches have not completely resolved

## 2018-02-17 ENCOUNTER — PATIENT MESSAGE (OUTPATIENT)
Dept: FAMILY MEDICINE | Facility: CLINIC | Age: 28
End: 2018-02-17

## 2018-02-17 ENCOUNTER — NURSE TRIAGE (OUTPATIENT)
Dept: ADMINISTRATIVE | Facility: CLINIC | Age: 28
End: 2018-02-17

## 2018-02-18 NOTE — TELEPHONE ENCOUNTER
"Pt called re steroids are swelling about LN in neckm hurts to swallow, able to drink fluids.     Reason for Disposition   [1] Node is in the neck AND [2] causes difficulty breathing    Answer Assessment - Initial Assessment Questions  1. LOCATION: "Where is the swollen node located?" "Is the matching node on the other side of the body also swollen?"     Swelling of LN in head and neck area. When laying flat feeling SOB. Using albuterol nebs q4-6 hours. Still taking steroid meds   2. SIZE: "How big is the node?" (Inches or centimeters) (or compare to common objects such as pea, bean, marble, golf ball)      Feels like someone grabbing throat and holding it since 2/14   3. ONSET: "When did the swelling start?"      2/14   4. NECK NODES: "Is there a sore throat, runny nose or other symptoms of a cold?"      No , feeling hot like fever   5. GROIN OR ARMPIT NODES: "Is there a sore, scratch, cut or painful red area on that arm or leg?"      No   6. FEVER: "Do you have a fever?" If so, ask: "What is it, how was it measured, and when did it start?"      Feeling warm   7. CAUSE: "What do you think is causing the swollen lymph nodes?"     meds   8. OTHER SYMPTOMS: "Do you have any other symptoms?"     No rash   9. PREGNANCY: "Is there any chance you are pregnant?" "When was your last menstrual period?"    No    Protocols used: ST LYMPH NODES [id number]  rec ED due to enlarged LN impeding airway especially when laying flat. Call back with questions.     "

## 2018-02-19 ENCOUNTER — TELEPHONE (OUTPATIENT)
Dept: FAMILY MEDICINE | Facility: CLINIC | Age: 28
End: 2018-02-19

## 2018-02-19 NOTE — TELEPHONE ENCOUNTER
"Pt states that her asthma is doing good, no attacks since Saturday when she took the prednisone and had to go to the ER. She denies, wheezing, coughing or being fatigued and tired    "If she does feel SOB she uses her inhaler and it clears up"    "

## 2018-02-19 NOTE — TELEPHONE ENCOUNTER
My chart message. Pt states on mychart.   I can't take prednisone bc my throat swell up and it's hard to breath .   Pt reports that she had a reaction to her prednisone and went to er on Saturday night, pt advise not to take anymore of this med until Dr Nino.    Please advise if there can be a replacement medication

## 2018-02-19 NOTE — TELEPHONE ENCOUNTER
----- Message from Letty Her sent at 2/19/2018  9:45 AM CST -----  Contact: pt  Pt states that someone in the office just called her and she is returning the call back.....[phone]

## 2018-02-24 ENCOUNTER — PATIENT MESSAGE (OUTPATIENT)
Dept: FAMILY MEDICINE | Facility: CLINIC | Age: 28
End: 2018-02-24

## 2018-02-27 ENCOUNTER — TELEPHONE (OUTPATIENT)
Dept: FAMILY MEDICINE | Facility: CLINIC | Age: 28
End: 2018-02-27

## 2018-02-27 NOTE — TELEPHONE ENCOUNTER
----- Message from Param Gonzalez sent at 2/26/2018  7:23 PM CST -----  Contact: pt  Pt is requesting an earlier appt due to having headaches.  Pt states she can not walk and stand straight.  Pt is requesting for Wednesday and Thursday appointments when spouse is off from work.      Pt can be reached at [phone].

## 2018-02-27 NOTE — TELEPHONE ENCOUNTER
Pt stated she been having headaches causing dizziness and effects her vision a little.   Pt stated no other symptoms, no fever, no pain.   Schedule pt with [facility] on Friday and she said that will be fine.     Recommend the pt if symptoms increase and worsen she should go to an emergent to be seen before appt

## 2018-03-02 ENCOUNTER — LAB VISIT (OUTPATIENT)
Dept: LAB | Facility: HOSPITAL | Age: 28
End: 2018-03-02
Attending: RADIOLOGY
Payer: MEDICAID

## 2018-03-02 ENCOUNTER — OFFICE VISIT (OUTPATIENT)
Dept: FAMILY MEDICINE | Facility: CLINIC | Age: 28
End: 2018-03-02
Payer: MEDICAID

## 2018-03-02 ENCOUNTER — PATIENT MESSAGE (OUTPATIENT)
Dept: FAMILY MEDICINE | Facility: CLINIC | Age: 28
End: 2018-03-02

## 2018-03-02 ENCOUNTER — TELEPHONE (OUTPATIENT)
Dept: OBSTETRICS AND GYNECOLOGY | Facility: CLINIC | Age: 28
End: 2018-03-02

## 2018-03-02 VITALS
HEIGHT: 60 IN | RESPIRATION RATE: 12 BRPM | BODY MASS INDEX: 33.76 KG/M2 | SYSTOLIC BLOOD PRESSURE: 112 MMHG | TEMPERATURE: 98 F | HEART RATE: 60 BPM | WEIGHT: 171.94 LBS | DIASTOLIC BLOOD PRESSURE: 79 MMHG

## 2018-03-02 DIAGNOSIS — R10.2 PELVIC PAIN: ICD-10-CM

## 2018-03-02 DIAGNOSIS — R82.90 ABNORMAL URINE ODOR: ICD-10-CM

## 2018-03-02 DIAGNOSIS — N92.6 IRREGULAR MENSES: ICD-10-CM

## 2018-03-02 DIAGNOSIS — N92.6 MENSTRUAL DISORDER: ICD-10-CM

## 2018-03-02 DIAGNOSIS — R11.0 NAUSEA: Primary | ICD-10-CM

## 2018-03-02 DIAGNOSIS — R82.4 URINE KETONE: ICD-10-CM

## 2018-03-02 LAB
B-HCG UR QL: NEGATIVE
BILIRUB SERPL-MCNC: ABNORMAL MG/DL
BLOOD URINE, POC: ABNORMAL
COLOR, POC UA: ABNORMAL
CTP QC/QA: YES
GLUCOSE UR QL STRIP: NORMAL
KETONES UR QL STRIP: ABNORMAL
LEUKOCYTE ESTERASE URINE, POC: ABNORMAL
NITRITE, POC UA: ABNORMAL
PH, POC UA: 5
PROTEIN, POC: ABNORMAL
SPECIFIC GRAVITY, POC UA: ABNORMAL
UROBILINOGEN, POC UA: 1

## 2018-03-02 PROCEDURE — 99214 OFFICE O/P EST MOD 30 MIN: CPT | Mod: S$GLB,,, | Performed by: NURSE PRACTITIONER

## 2018-03-02 PROCEDURE — 81002 URINALYSIS NONAUTO W/O SCOPE: CPT | Mod: S$GLB,,, | Performed by: NURSE PRACTITIONER

## 2018-03-02 PROCEDURE — 87086 URINE CULTURE/COLONY COUNT: CPT

## 2018-03-02 PROCEDURE — 81025 URINE PREGNANCY TEST: CPT | Mod: S$GLB,,, | Performed by: NURSE PRACTITIONER

## 2018-03-02 RX ORDER — ACETAMINOPHEN 500 MG
500 TABLET ORAL EVERY 6 HOURS PRN
COMMUNITY

## 2018-03-02 NOTE — PROGRESS NOTES
Subjective:       Patient ID: Carlos Coulter is a 27 y.o. female.    Chief Complaint: headaches over one week, ovary pain    HPI one week headache. Off and on. Taking tylenol and that helps. No allergy type symptoms. Eating fine. Hydrating, not well. Drinks a lot of diet cokes. Having pelvic type pain. She has appointment with OB in March. Nausea. No vomiting or diarrhea. Some vaginal bleeding but not heavy off and on for 3 weeks. She is not on birth control. She state that she has noticed a strong odor to her urine but  No pain with urination. She denies any pain with intercourse. No fever. States when she gets a bad headache she will get dizzy at times. States she has been under a lot of stress lately. See ROS.    The following portion of the patients history was reviewed and updated as appropriate: allergies, current medications, past medical and surgical history. Past social history and problem list reviewed. Family PMH and Past social history reviewed. Tobacco, Illicit drug use reviewed.     Review of Systems   Constitutional: Positive for fatigue. Negative for fever.   HENT: Positive for postnasal drip and sneezing. Negative for ear discharge, ear pain and sore throat.    Eyes: Negative for visual disturbance.   Respiratory: Negative for cough, shortness of breath and wheezing.    Cardiovascular: Negative for chest pain and palpitations.   Gastrointestinal: Positive for abdominal pain (pelvic pain, tenderness) and nausea (not associated with food). Negative for diarrhea and vomiting.   Genitourinary: Negative for flank pain and frequency.        Strong urine odor   Neurological: Positive for dizziness (off and on with headache) and headaches (off and on for about a week).   Psychiatric/Behavioral: The patient is nervous/anxious.        Objective:     /79 Comment: auto left arm sitting, not able to get manual  Pulse 60   Temp 97.6 °F (36.4 °C) (Oral)   Resp 12   Ht 5' (1.524 m)   Wt 78 kg  (171 lb 15.3 oz)   LMP 02/20/2018 (Exact Date)   BMI 33.58 kg/m²      Physical Exam     Constitutional: oriented to person, place, and time. well-developed and well-nourished.   HENT: normal nares. Throat with PND to posterior pharynx. No exudate  Head: Normocephalic.   Eyes: Conjunctivae are normal. Pupils are equal, round, and reactive to light.   Neck: Normal range of motion. Neck supple. No tracheal deviation present. No thyromegaly present. no enlarged or tender anterior cervical lymph nodes.  Cardiovascular: Normal rate, regular rhythm and normal heart sounds.    Pulmonary/Chest: Effort normal and breath sounds normal. No respiratory distress. No wheezes.   Abdominal: Soft. Bowel sounds are normal. No distension. Some bilateral pelvic tenderness. No rebound.   Musculoskeletal: Normal range of motion. Gait and coordination normal.   Neurological: oriented to person, place, and time.   Skin: Skin is warm and dry. No rashes or lesions  Psychiatric: Normal mood and affect.Behavior is normal. Judgment and thought content normal.   Assessment:       1. Nausea    2. Menstrual disorder    3. Abnormal urine odor    4. Urine ketone    5. Pelvic pain    6. Irregular menses        Plan:         Carlos was seen today for headaches over one week, ovary pain.    Diagnoses and all orders for this visit:    Nausea: urine pregnancy is negative.  -     POCT urine pregnancy  -     TSH; Future    Menstrual disorder: she has appointment next week with OB/GYN.   -     POCT urine pregnancy:negative.    Abnormal urine odor: will get urine culture  -     POCT urine dipstick without microscope  -     Urine culture; Future      Results for orders placed or performed in visit on 03/02/18   POCT urine pregnancy   Result Value Ref Range    POC Preg Test, Ur Negative Negative     Acceptable Yes    POCT urine dipstick without microscope   Result Value Ref Range    Color, UA isaiah, odor     Spec Grav UA      pH, UA 5     WBC,  UA neg     Nitrite, UA neg     Protein +     Glucose, UA normal     Ketones, UA +     Urobilinogen, UA 1     Bilirubin ++     Blood, UA trace      Urine ketone: will check labs.  -     CBC auto differential; Future  -     Comprehensive metabolic panel; Future  -     Hemoglobin A1c; Future  -     Urine culture; Future    Pelvic pain: she needs to have US and keep appointment next week with OB/GYN.   -     US Pelvis Complete Non OB; Future    Irregular menses  -     US Pelvis Complete Non OB; Future    Continue current medication  Take medications only as prescribed  Healthy diet, exercise  Adequate rest  Adequate hydration  Avoid allergens  Avoid excessive caffeine

## 2018-03-02 NOTE — TELEPHONE ENCOUNTER
----- Message from Rin De Luna sent at 3/2/2018  8:00 AM CST -----  Contact: Carlos  Patient is asking to be seen today in the afternoon for menstruation three times last month and abominal pain. Please call [phone]. Thanks!

## 2018-03-04 LAB
BACTERIA UR CULT: NORMAL
BACTERIA UR CULT: NORMAL

## 2018-03-05 ENCOUNTER — PATIENT MESSAGE (OUTPATIENT)
Dept: FAMILY MEDICINE | Facility: CLINIC | Age: 28
End: 2018-03-05

## 2018-03-05 NOTE — TELEPHONE ENCOUNTER
Her ultrasound was normal. She needs to keep the appointment with GYN that she already has scheduled.

## 2018-03-14 ENCOUNTER — OFFICE VISIT (OUTPATIENT)
Dept: ALLERGY | Facility: CLINIC | Age: 28
End: 2018-03-14
Payer: MEDICAID

## 2018-03-14 VITALS — HEART RATE: 80 BPM | WEIGHT: 171.94 LBS | HEIGHT: 60 IN | OXYGEN SATURATION: 99 % | BODY MASS INDEX: 33.76 KG/M2

## 2018-03-14 DIAGNOSIS — J31.0 OTHER CHRONIC RHINITIS: ICD-10-CM

## 2018-03-14 DIAGNOSIS — J45.31 MILD PERSISTENT ASTHMA WITH ACUTE EXACERBATION: Primary | ICD-10-CM

## 2018-03-14 PROCEDURE — 99999 PR PBB SHADOW E&M-EST. PATIENT-LVL III: CPT | Mod: PBBFAC,,, | Performed by: ALLERGY & IMMUNOLOGY

## 2018-03-14 PROCEDURE — 99214 OFFICE O/P EST MOD 30 MIN: CPT | Mod: S$PBB,,, | Performed by: ALLERGY & IMMUNOLOGY

## 2018-03-14 PROCEDURE — 99213 OFFICE O/P EST LOW 20 MIN: CPT | Mod: PBBFAC,PO | Performed by: ALLERGY & IMMUNOLOGY

## 2018-03-14 RX ORDER — FLUTICASONE PROPIONATE 50 MCG
2 SPRAY, SUSPENSION (ML) NASAL DAILY
Qty: 48 G | Refills: 4 | Status: SHIPPED | OUTPATIENT
Start: 2018-03-14 | End: 2019-03-14

## 2018-03-14 RX ORDER — MONTELUKAST SODIUM 10 MG/1
TABLET ORAL
Qty: 90 TABLET | Refills: 4 | Status: SHIPPED | OUTPATIENT
Start: 2018-03-14

## 2018-03-14 RX ORDER — AZELASTINE 1 MG/ML
2 SPRAY, METERED NASAL 2 TIMES DAILY PRN
Qty: 90 ML | Refills: 4 | Status: SHIPPED | OUTPATIENT
Start: 2018-03-14

## 2018-03-14 NOTE — PATIENT INSTRUCTIONS
Continue Q olivia 2 puffs every AM  Can use albuterol inhaler or nebulizer as needed for wheeze, cough  Take montelukast every day  Fluticasone 2 sprays each nostril every day  Can add azelastine 2 sprays each nostril twice daily as needed for congestion or runny nose

## 2018-03-14 NOTE — PROGRESS NOTES
Subjective:       Patient ID: Carlos Coulter is a 27 y.o. female.    Chief Complaint:  Annual Exam (f/u, shortness of breath. )      27 year-old woman presents for continued evaluation of chronic rhinitis and mild asthma. She was last seen 1/4/2017. She has had immunocaps drawn which were all negative. She had PFTs done which were normal. However if she does not stay on Q olivia daily she needs albuterol daily. In last few months she has more HARKINS. She is worse outside and with dust do using albuterol neb more often right now. She is on montelukast but not always every day. She is on Flonase 1 SEN daily. Never used azelastine.  No night symptoms. She is also trying to get pregnant.  She takes Q olivia 80 2 puff qAM had to stop night time because made too hyper. No infections recently.      Prior History taken 7/6/15: new patient evaluation of allergy and asthma. She is accompanied by her mom who is pt here for asthma. She has bipolar and is not best historian but is able to answer all questions, needs to be redirected often.   1. Asthma. She states she ha shad since was a baby. As small child (1-3) she was hospitalized for asthma, never ICU or intubated. Since then not been to ER or hospital. She rarely has flares where needs oral steroids. She does find she gets out of breath easily with walking far or chasing her 3 yo nephew. When this occurs she usually stops and rest and resolves. Rarely uses inhaler. She has Q olivia supposed to be on BID but does not do and rarely uses albuterol. No night symptoms. No oral steroids in many years.  2. Rhinitis. She is much worse in spring and some in summer. She gets congestion. Ears hurt, sore throat, stuffy nose, headaches, runny nose, sneeze and itchy watery eyes. Worse in evening time. Worse outside. Worse around dust and around some flowers. Takes Singulair daily but no antihistamines. Takes Flonase daily but does have days where forgets. She had ear tubes as baby and  had hearing loss as child which recovered. Not sure if ever had allergy test.  3. Food allergy. She reports pineapple caused her tongue and throat to swell and had to rush to ER. Grapefruit causes a rash on face and tongue swelling. She also for past year has trouble swallowing. Yesterday had rice get stuck in her throat. Dad and grandmother have history of dilation. She does often feel something come up in throat, like reflux.  No insect or latex allergy. Med allergies as per card.      Asthma   She complains of cough and shortness of breath. There is no wheezing. Associated symptoms include headaches, postnasal drip and rhinorrhea. Pertinent negatives include no appetite change, chest pain, ear pain, fever, myalgias, sneezing, sore throat or trouble swallowing. Her past medical history is significant for asthma.       Environmental History: Pets in the home: dogs (3) and cats (2).  see history section for home environment  Review of Systems   Constitutional: Negative for activity change, appetite change, chills, fatigue, fever and unexpected weight change.   HENT: Positive for hearing loss, nosebleeds, postnasal drip and rhinorrhea. Negative for congestion, drooling, ear discharge, ear pain, facial swelling, mouth sores, sinus pressure, sneezing, sore throat, tinnitus, trouble swallowing and voice change.    Eyes: Positive for itching. Negative for photophobia, pain, discharge and visual disturbance.   Respiratory: Positive for cough and shortness of breath. Negative for chest tightness, wheezing and stridor.    Cardiovascular: Negative for chest pain and palpitations.   Gastrointestinal: Negative for abdominal pain, constipation, diarrhea, nausea and vomiting.   Genitourinary: Negative for difficulty urinating.   Musculoskeletal: Negative for arthralgias, joint swelling, myalgias and neck pain.   Skin: Negative for color change, pallor, rash and wound.   Allergic/Immunologic: Positive for environmental allergies  and food allergies. Negative for immunocompromised state.   Neurological: Positive for headaches. Negative for dizziness, syncope, weakness, light-headedness and numbness.   Hematological: Negative for adenopathy. Does not bruise/bleed easily.   Psychiatric/Behavioral: Negative for self-injury and sleep disturbance.        Objective:    Physical Exam   Constitutional: She is oriented to person, place, and time. She appears well-developed and well-nourished. No distress.   HENT:   Head: Normocephalic and atraumatic.   Right Ear: Hearing, tympanic membrane, external ear and ear canal normal.   Left Ear: Hearing, tympanic membrane, external ear and ear canal normal.   Nose: No mucosal edema, rhinorrhea, sinus tenderness or septal deviation. No epistaxis. Right sinus exhibits no maxillary sinus tenderness and no frontal sinus tenderness. Left sinus exhibits no maxillary sinus tenderness and no frontal sinus tenderness.   Mouth/Throat: Uvula is midline, oropharynx is clear and moist and mucous membranes are normal. No uvula swelling.   Eyes: Conjunctivae are normal. Right eye exhibits no discharge. Left eye exhibits no discharge.   Neck: Normal range of motion. No thyromegaly present.   Cardiovascular: Normal rate, regular rhythm and normal heart sounds.    No murmur heard.  Pulmonary/Chest: Effort normal and breath sounds normal. No respiratory distress. She has no wheezes.   Abdominal: Soft. She exhibits no distension. There is no tenderness.   Musculoskeletal: Normal range of motion. She exhibits no edema or tenderness.   Lymphadenopathy:     She has no cervical adenopathy.   Neurological: She is alert and oriented to person, place, and time.   Skin: Skin is warm and dry. No rash noted. No erythema.   Psychiatric: She has a normal mood and affect. Her behavior is normal. Judgment and thought content normal.   Nursing note and vitals reviewed.      Laboratory:   none performed   Assessment:       1. Mild persistent  asthma with acute exacerbation    2. Other chronic rhinitis         Plan:       1. Continue  Q olivia 80 mcg 2 puff daily  2. Continue Singulair daily but need every day and zyrtec or Claritin daily  3. Continue fluticasone 2 SEN daily and add azelastine 2 SEN BID as needed  4. albuterol 2 puffs every 4 hours as needed  5. RTC annually or sooner if needed

## 2018-03-15 ENCOUNTER — OFFICE VISIT (OUTPATIENT)
Dept: OBSTETRICS AND GYNECOLOGY | Facility: CLINIC | Age: 28
End: 2018-03-15
Payer: MEDICAID

## 2018-03-15 VITALS
BODY MASS INDEX: 33.5 KG/M2 | HEIGHT: 60 IN | SYSTOLIC BLOOD PRESSURE: 112 MMHG | WEIGHT: 170.63 LBS | DIASTOLIC BLOOD PRESSURE: 74 MMHG

## 2018-03-15 DIAGNOSIS — N92.6 IRREGULAR PERIODS: Primary | ICD-10-CM

## 2018-03-15 PROCEDURE — 99213 OFFICE O/P EST LOW 20 MIN: CPT | Mod: S$PBB,,, | Performed by: OBSTETRICS & GYNECOLOGY

## 2018-03-15 PROCEDURE — 99999 PR PBB SHADOW E&M-EST. PATIENT-LVL III: CPT | Mod: PBBFAC,,, | Performed by: OBSTETRICS & GYNECOLOGY

## 2018-03-15 PROCEDURE — 99213 OFFICE O/P EST LOW 20 MIN: CPT | Mod: PBBFAC,PN | Performed by: OBSTETRICS & GYNECOLOGY

## 2018-03-15 RX ORDER — NORETHINDRONE ACETATE AND ETHINYL ESTRADIOL .02; 1 MG/1; MG/1
1 TABLET ORAL DAILY
Qty: 30 TABLET | Refills: 11 | Status: SHIPPED | OUTPATIENT
Start: 2018-03-15 | End: 2018-05-02

## 2018-03-15 NOTE — PROGRESS NOTES
Chief Complaint   Patient presents with    Metrorrhagia       History of Present Illness: Carlos Coulter is a 27 y.o. female that presents today 3/15/2018 for   Chief Complaint   Patient presents with    Metrorrhagia         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; casted B    Bipolar disorder     sees psych    Chronic constipation     Depression     sees psych    Eczema     GERD (gastroesophageal reflux disease)     Growth hormone deficiency     tx with growth hormone ages 12 - 15 by Dr Brewer    Heart murmur     saw cardiology 9/2014, intermittent, asymptomatic    Hemorrhoid     Hypothyroidism     sees Dr Brewer (endo)    Insomnia     Learning disability     NOLAN (obstructive sleep apnea)     does not use CPap    Speech abnormality        Past Surgical History:   Procedure Laterality Date    ADENOIDECTOMY  age 4    COLONOSCOPY N/A 1/17/2018    Procedure: COLONOSCOPY;  Surgeon: Rubio Nguyen MD;  Location: [facility];  Service: Endoscopy;  Laterality: N/A;    FOOT SURGERY  11/13/2014. 3/2015    L foot    FOOT SURGERY Right 10/2014    Right foot surgery    KNEE SURGERY  right/11/2015    KNEE SURGERY Right 03/09/2017    injury to right knee    MULTIPLE TOOTH EXTRACTIONS      MUSCLE RELEASE Bilateral     Lower extremities/ calf due to congenital club feet    TYMPANOSTOMY TUBE PLACEMENT  age 4    UPPER GASTROINTESTINAL ENDOSCOPY  05/2016    Dr. Nguyen    UPPER GASTROINTESTINAL ENDOSCOPY  09/05/2017    Dr. Nguyen       Current Outpatient Prescriptions   Medication Sig Dispense Refill    albuterol (PROAIR HFA) 90 mcg/actuation inhaler INHALE 1 PUFF EVERY 4 HOURS PRN for Wheezing 8.5 g 2    albuterol (PROVENTIL) 2.5 mg /3 mL (0.083 %) nebulizer solution Take 3 mLs  (2.5 mg total) by nebulization every 6 (six) hours as needed for Wheezing. Rescue 1 Box 1    aripiprazole (ABILIFY) 5 MG Tab Take 5 mg by mouth once daily.      azelastine (ASTELIN) 137 mcg (0.1 %) nasal spray 2 sprays (274 mcg total) by Nasal route 2 (two) times daily as needed for Rhinitis. 90 mL 4    beclomethasone (QVAR) 80 mcg/actuation Aero Inhale 2 puffs into the lungs once daily. Controller 3 each 4    clobetasol (TEMOVATE) 0.05 % cream Apply 1 application topically daily as needed.       fluticasone (FLONASE) 50 mcg/actuation nasal spray 2 sprays (100 mcg total) by Each Nare route once daily. 48 g 4    inhalation device (BREATHERITE VALVED MDI SPACER) Use as directed for inhalation. 1 Device 0    levothyroxine (SYNTHROID) 88 MCG tablet TAKE 1 TABLET(88 MCG) BY MOUTH EVERY DAY 30 tablet 5    lisdexamfetamine (VYVANSE) 40 MG Cap Take 40 mg by mouth once daily.      montelukast (SINGULAIR) 10 mg tablet TAKE 1 TABLET(10 MG) BY MOUTH EVERY DAY 90 tablet 4    ranitidine (ZANTAC) 300 MG tablet Take 1 tablet (300 mg total) by mouth every evening. 30 tablet 2    triamcinolone acetonide 0.1% (KENALOG) 0.1 % ointment AAA bid x 2 weeks then prn, avoid chronic use 454 g 1    acetaminophen (TYLENOL) 500 MG tablet Take 500 mg by mouth every 6 (six) hours as needed for Pain.      DILTIAZEM HCL (DILTIAZEM 2% CREAM) Apply topically 3 (three) times daily. Apply topically to anal area. 30 g 0    diphenhydrAMINE (BENADRYL) 50 MG capsule Take 1 capsule (50 mg total) by mouth every 6 (six) hours as needed for Itching or Allergies. 20 capsule 0    norethindrone-ethinyl estradiol (MICROGESTIN 1/20) 1-20 mg-mcg per tablet Take 1 tablet by mouth once daily. 30 tablet 11    trazodone (DESYREL) 100 MG tablet Take 100 mg by mouth nightly as needed for Insomnia.       No current facility-administered medications for this visit.        Review of patient's allergies indicates:   Allergen Reactions    Grapefruit Anaphylaxis     Pineapple Anaphylaxis    Prednisolone Anaphylaxis    Augmentin [amoxicillin-pot clavulanate] Hives    Penicillins Hives    Tamiflu [oseltamivir] Diarrhea and Nausea And Vomiting     Pt states allergy to Theraflu.       Family History   Problem Relation Age of Onset    Diabetes Mother     Heart disease Mother     Thyroid disease Mother     Depression Mother     Asthma Mother     Arthritis Mother     Allergic rhinitis Mother     Colon polyps Mother     Diverticulitis Mother     Depression Father     Migraines Father     Thyroid disease Father     PAVEL disease Father     Early death Brother       1 hour after birth    Thyroid disease Maternal Grandmother     Seizures Maternal Grandmother     Clotting disorder Maternal Grandmother     Pulmonary fibrosis Maternal Grandfather     Colon cancer Maternal Grandfather     Diabetes Paternal Grandmother     Arthritis Paternal Grandmother     Schizophrenia Paternal Grandmother     Depression Paternal Grandmother     Early death Paternal Grandfather 54    Aneurysm Paternal Grandfather     Crohn's disease Maternal Cousin     Angioedema Neg Hx     Immunodeficiency Neg Hx     Urticaria Neg Hx     Collagen disease Neg Hx     Ulcerative colitis Neg Hx     Celiac disease Neg Hx     Eczema Neg Hx        Social History   Substance Use Topics    Smoking status: Passive Smoke Exposure - Never Smoker     Last attempt to quit: 2014    Smokeless tobacco: Never Used    Alcohol use Yes      Comment: rare       OB History    Para Term  AB Living   1       1     SAB TAB Ectopic Multiple Live Births   1              # Outcome Date GA Lbr Luis/2nd Weight Sex Delivery Anes PTL Lv   1 SAB                   Review of Symptoms:  GENERAL: Denies weight gain or weight loss. Feeling well overall.   SKIN: Denies rash or lesions.   HEAD: Denies head injury or headache.   NODES: Denies enlarged lymph nodes.   CHEST: Denies chest pain or  shortness of breath.   CARDIOVASCULAR: Denies palpitations or left sided chest pain.   ABDOMEN: No abdominal pain, constipation, diarrhea, nausea, vomiting or rectal bleeding.   URINARY: No frequency, dysuria, hematuria, or burning on urination.  HEMATOLOGIC: No easy bruisability or excessive bleeding.   MUSCULOSKELETAL: Denies joint pain or swelling.     /74   Ht 5' (1.524 m)   Wt 77.4 kg (170 lb 10.2 oz)   LMP 02/20/2018 (Exact Date)   Physical Exam:  APPEARANCE: Well nourished, well developed, in no acute distress.  SKIN: Normal skin turgor, no lesions.  NECK: Neck symmetric without masses   RESPIRATORY: Normal respiratory effort with no retractions or use of accessory muscles  CARDIOVASCULAR: Peripheral vascular system with no swelling no varicosities and palpation of pulses normal  LYMPHATIC: No enlargements of the lymph nodes noted in the neck, axillae, or groin  ABDOMEN: Soft. No tenderness or masses. No hepatosplenomegaly. No hernias.EXTREMITIES: No clubbing cyanosis or edema.    ASSESSMENT/PLAN:  Irregular periods  -     norethindrone-ethinyl estradiol (MICROGESTIN 1/20) 1-20 mg-mcg per tablet; Take 1 tablet by mouth once daily.  Dispense: 30 tablet; Refill: 11        15 minutes spent today with this patient. Greater than half spent in counseling today.

## 2018-03-17 ENCOUNTER — NURSE TRIAGE (OUTPATIENT)
Dept: ADMINISTRATIVE | Facility: CLINIC | Age: 28
End: 2018-03-17

## 2018-03-17 NOTE — TELEPHONE ENCOUNTER
"  Reason for Disposition   Difficulty swallowing is a chronic symptom (recurrent or ongoing AND present > 4 weeks)    Answer Assessment - Initial Assessment Questions  1. SYMPTOM: "Are you having difficulty swallowing liquids, solids, or both?"      When she eats or drinks it feels like food will not go down.  2. ONSET: "When did the swallowing problems begin?"       2 days ago  3. CAUSE: "What do you think is causing the problem?"       unknown  4. CHRONIC/RECURRENT: "Is this a new problem for you?"  If no, ask: "How long have you had this problem?" (e.g., days, weeks, months)       No. She was seen 2 years ago for this problem.  5. OTHER SYMPTOMS: "Do you have any other symptoms?" (e.g., difficulty breathing, sore throat, swollen tongue, chest pain)      No  6. PREGNANCY: "Is there any chance you are pregnant?" "When was your last menstrual period?"      No    Protocols used: ST SWALLOWING DIFFICULTY-A-    Care advice discussed. Patient voices understanding. She states Dr Nguyen advised her to be seen in his office every 6 months to be assessed for this problem and it has now been 6 months. Patient would like an appointment. Please contact caller directly to discuss any further care advice.  "

## 2018-03-22 ENCOUNTER — ANESTHESIA EVENT (OUTPATIENT)
Dept: ENDOSCOPY | Facility: HOSPITAL | Age: 28
End: 2018-03-22
Payer: MEDICAID

## 2018-03-22 ENCOUNTER — SURGERY (OUTPATIENT)
Age: 28
End: 2018-03-22

## 2018-03-22 ENCOUNTER — ANESTHESIA (OUTPATIENT)
Dept: ENDOSCOPY | Facility: HOSPITAL | Age: 28
End: 2018-03-22
Payer: MEDICAID

## 2018-03-22 ENCOUNTER — HOSPITAL ENCOUNTER (OUTPATIENT)
Facility: HOSPITAL | Age: 28
Discharge: HOME | End: 2018-03-22
Attending: INTERNAL MEDICINE | Admitting: INTERNAL MEDICINE
Payer: MEDICAID

## 2018-03-22 VITALS
DIASTOLIC BLOOD PRESSURE: 57 MMHG | SYSTOLIC BLOOD PRESSURE: 118 MMHG | OXYGEN SATURATION: 97 % | HEART RATE: 71 BPM | HEIGHT: 60 IN | TEMPERATURE: 97 F | WEIGHT: 170 LBS | RESPIRATION RATE: 17 BRPM | BODY MASS INDEX: 33.38 KG/M2

## 2018-03-22 DIAGNOSIS — R13.10 DYSPHAGIA: ICD-10-CM

## 2018-03-22 LAB
B-HCG UR QL: NEGATIVE
CTP QC/QA: YES

## 2018-03-22 PROCEDURE — 37000008 HC ANESTHESIA 1ST 15 MINUTES: Mod: PO | Performed by: INTERNAL MEDICINE

## 2018-03-22 PROCEDURE — 43239 EGD BIOPSY SINGLE/MULTIPLE: CPT | Mod: 59,,, | Performed by: INTERNAL MEDICINE

## 2018-03-22 PROCEDURE — 43239 EGD BIOPSY SINGLE/MULTIPLE: CPT | Mod: 59,PO | Performed by: INTERNAL MEDICINE

## 2018-03-22 PROCEDURE — 88305 TISSUE EXAM BY PATHOLOGIST: CPT | Mod: 26,,, | Performed by: PATHOLOGY

## 2018-03-22 PROCEDURE — 81025 URINE PREGNANCY TEST: CPT | Mod: PO | Performed by: INTERNAL MEDICINE

## 2018-03-22 PROCEDURE — 00731 ANES UPR GI NDSC PX NOS: CPT | Mod: PO | Performed by: INTERNAL MEDICINE

## 2018-03-22 PROCEDURE — D9220A PRA ANESTHESIA: Mod: ANES,,, | Performed by: ANESTHESIOLOGY

## 2018-03-22 PROCEDURE — 88305 TISSUE EXAM BY PATHOLOGIST: CPT | Performed by: PATHOLOGY

## 2018-03-22 PROCEDURE — 25000003 PHARM REV CODE 250: Mod: PO | Performed by: INTERNAL MEDICINE

## 2018-03-22 PROCEDURE — 43248 EGD GUIDE WIRE INSERTION: CPT | Mod: ,,, | Performed by: INTERNAL MEDICINE

## 2018-03-22 PROCEDURE — 43248 EGD GUIDE WIRE INSERTION: CPT | Mod: PO | Performed by: INTERNAL MEDICINE

## 2018-03-22 PROCEDURE — D9220A PRA ANESTHESIA: Mod: CRNA,,, | Performed by: NURSE ANESTHETIST, CERTIFIED REGISTERED

## 2018-03-22 PROCEDURE — 37000009 HC ANESTHESIA EA ADD 15 MINS: Mod: PO | Performed by: INTERNAL MEDICINE

## 2018-03-22 PROCEDURE — 63600175 PHARM REV CODE 636 W HCPCS: Mod: PO | Performed by: NURSE ANESTHETIST, CERTIFIED REGISTERED

## 2018-03-22 PROCEDURE — 27201012 HC FORCEPS, HOT/COLD, DISP: Mod: PO | Performed by: INTERNAL MEDICINE

## 2018-03-22 RX ORDER — LIDOCAINE HCL/PF 100 MG/5ML
SYRINGE (ML) INTRAVENOUS
Status: DISCONTINUED | OUTPATIENT
Start: 2018-03-22 | End: 2018-03-22

## 2018-03-22 RX ORDER — PROPOFOL 10 MG/ML
VIAL (ML) INTRAVENOUS
Status: DISCONTINUED | OUTPATIENT
Start: 2018-03-22 | End: 2018-03-22

## 2018-03-22 RX ORDER — SODIUM CHLORIDE, SODIUM LACTATE, POTASSIUM CHLORIDE, CALCIUM CHLORIDE 600; 310; 30; 20 MG/100ML; MG/100ML; MG/100ML; MG/100ML
INJECTION, SOLUTION INTRAVENOUS CONTINUOUS
Status: DISCONTINUED | OUTPATIENT
Start: 2018-03-22 | End: 2018-03-22 | Stop reason: HOSPADM

## 2018-03-22 RX ORDER — PANTOPRAZOLE SODIUM 40 MG/1
40 TABLET, DELAYED RELEASE ORAL DAILY
Qty: 30 TABLET | Refills: 2 | Status: SHIPPED | OUTPATIENT
Start: 2018-03-22 | End: 2019-03-22

## 2018-03-22 RX ORDER — LIDOCAINE HYDROCHLORIDE 10 MG/ML
1 INJECTION, SOLUTION EPIDURAL; INFILTRATION; INTRACAUDAL; PERINEURAL ONCE
Status: COMPLETED | OUTPATIENT
Start: 2018-03-22 | End: 2018-03-22

## 2018-03-22 RX ADMIN — LIDOCAINE HYDROCHLORIDE 1 MG: 10 INJECTION, SOLUTION EPIDURAL; INFILTRATION; INTRACAUDAL; PERINEURAL at 11:03

## 2018-03-22 RX ADMIN — PROPOFOL 30 MG: 10 INJECTION, EMULSION INTRAVENOUS at 12:03

## 2018-03-22 RX ADMIN — PROPOFOL 60 MG: 10 INJECTION, EMULSION INTRAVENOUS at 12:03

## 2018-03-22 RX ADMIN — SODIUM CHLORIDE, SODIUM LACTATE, POTASSIUM CHLORIDE, CALCIUM CHLORIDE: 600; 310; 30; 20 INJECTION, SOLUTION INTRAVENOUS at 11:03

## 2018-03-22 RX ADMIN — LIDOCAINE HYDROCHLORIDE 100 MG: 20 INJECTION, SOLUTION INTRAVENOUS at 12:03

## 2018-03-22 NOTE — PROVATION PATIENT INSTRUCTIONS
Discharge Summary/Instructions after an Endoscopic Procedure  Patient Name: Carlos Coulter  Patient MRN: [id number]  Patient YOB: 1990 Thursday, March 22, 2018  Rubio Nguyen MD  RESTRICTIONS:  During your procedure today, you received medications for sedation.  These   medications may affect your judgment, balance and coordination.  Therefore,   for 24 hours, you have the following restrictions:   - DO NOT drive a car, operate machinery, make legal/financial decisions,   sign important papers or drink alcohol.    ACTIVITY:  The following day: return to full activity including work, except no heavy   lifting, straining or running for 3 days if polyps were removed.  DIET:  Eat and drink normally unless instructed otherwise.     TREATMENT FOR COMMON SIDE EFFECTS:  - Mild abdominal pain, nausea, belching, bloating or excessive gas:  rest,   eat lightly and use a heating pad.  - Sore Throat: treat with throat lozenges and/or gargle with warm salt   water.  - Because air was used during the procedure, expelling large amounts of air   from your rectum or belching is normal.  - If a bowel prep was taken, you may not have a bowel movement for 1-3 days.    This is normal.  SYMPTOMS TO WATCH FOR AND REPORT TO YOUR PHYSICIAN:  1. Abdominal pain or bloating, other than gas cramps.  2. Chest pain.  3. Back pain.  4. Signs of infection such as: chills or fever occurring within 24 hours   after the procedure.  5. Rectal bleeding, which would show as bright red, maroon, or black stools.   (A tablespoon of blood from the rectum is not serious, especially if   hemorrhoids are present.)  6. Vomiting.  7. Weakness or dizziness.  GO DIRECTLY TO THE NEAREST EMERGENCY ROOM IF YOU HAVE ANY OF THE FOLLOWING:      Difficulty breathing              Chills and/or fever over 101 F   Persistent vomiting and/or vomiting blood   Severe abdominal pain   Severe chest pain   Black, tarry stools   Bleeding- more than one  tablespoon   Any other symptom or condition that you feel may need urgent attention  Your doctor recommends these additional instructions:  If any biopsies were taken, your doctors clinic will contact you in 1 to 2   weeks with any results.  We are waiting for your pathology results.   Continue your present medications.   Your physician has recommended a repeat upper endoscopy as needed for   retreatment.   You are being discharged to home.  For questions, problems or results please call your physician - Rubio Nguyen MD at Work: [phone].  EMERGENCY PHONE NUMBER: [phone], LAB RESULTS: [phone]  IF A COMPLICATION OR EMERGENCY SITUATION ARISES AND YOU ARE UNABLE TO REACH   YOUR PHYSICIAN - GO DIRECTLY TO THE EMERGENCY ROOM.  ___________________________________________  Nurse Signature  ___________________________________________  Patient/Designated Responsible Party Signature  Rubio Nguyen MD  3/22/2018 12:30:25 PM  This report has been verified and signed electronically.

## 2018-03-22 NOTE — TRANSFER OF CARE
Anesthesia Transfer of Care Note    Patient: Carlos Coulter    Procedure(s) Performed: Procedure(s) (LRB):  ESOPHAGOGASTRODUODENOSCOPY (EGD) (N/A)    Patient location: PACU    Anesthesia Type: general    Transport from OR: Transported from OR on room air with adequate spontaneous ventilation    Post pain: adequate analgesia    Post assessment: no apparent anesthetic complications and tolerated procedure well    Post vital signs: stable    Level of consciousness: awake and alert    Nausea/Vomiting: no nausea/vomiting    Complications: none    Transfer of care protocol was followed      Last vitals:   Visit Vitals  BP (!) 103/57 (BP Location: Right arm, Patient Position: Lying)   Pulse 66   Temp 36.3 °C (97.3 °F) (Skin)   Resp 18   Ht 5' (1.524 m)   Wt 77.1 kg (170 lb)   LMP 02/28/2018   SpO2 96%   Breastfeeding? No   BMI 33.20 kg/m²

## 2018-03-22 NOTE — DISCHARGE INSTRUCTIONS
Recovery After Procedural Sedation (Adult)  You have been given medicine by vein to make you sleep during your surgery. This may have included both a pain medicine and sleeping medicine. Most of the effects have worn off. But you may still have some drowsiness for the next 6 to 8 hours.  Home care  Follow these guidelines when you get home:  · For the next 8 hours, you should be watched by a responsible adult. This person should make sure your condition is not getting worse.  · Don't drink any alcohol for the next 24 hours.  · Don't drive, operate dangerous machinery, or make important business or personal decisions during the next 24 hours.  Note: Your healthcare provider may tell you not to take any medicine by mouth for pain or sleep in the next 4 hours. These medicines may react with the medicines you were given in the hospital. This could cause a much stronger response than usual.  Follow-up care  Follow up with your healthcare provider if you are not alert and back to your usual level of activity within 12 hours.  When to seek medical advice  Call your healthcare provider right away if any of these occur:  · Drowsiness gets worse  · Weakness or dizziness gets worse  · Repeated vomiting  · You can't be awakened   Date Last Reviewed: 10/18/2016  © 7216-5745 [employer]. [address] [place] [address], [place] [address]. All rights reserved. This information is not intended as a substitute for professional medical care. Always follow your healthcare professional's instructions.        Esophageal Dilation     A balloon dilator may be used to widen a stricture in the esophagus.   An esophageal dilation is a procedure used to widen a narrowed section of your esophagus. This is the tube that leads from your throat to your stomach. Narrowing (stricture) of the esophagus can cause problems. These include trouble swallowing. This sheet explains what to expect with esophageal dilation.  Why esophageal dilation is  needed  Several problems can be treated with esophageal dilation. They include:  · Peptic stricture. This is caused by reflux esophagitis. With this problem, the esophagus is irritated by acid reflux (heartburn). This occurs when acid from your stomach flows back up into the esophagus. Stomach acid damages the lining of the esophagus. This leads to a buildup of scar tissue. As a result, the esophagus is narrowed.  · Schatzkis ring. This is an abnormal ring of tissue. It forms where the esophagus meets the stomach. It can cause trouble swallowing. It can also cause food to get stuck in the esophagus. The cause of this condition is not known.  · Achalasia. This condition stops food and liquids from moving into your stomach from the esophagus. It affects the lower esophageal sphincter (LES). The LES is a muscular ring that opens (relaxes) when you swallow. With achalasia, the LES does not relax. This condition can also cause problems with peristalsis. This is the normal muscular action of the esophagus that moves food into the stomach.  · Eosinophilic esophagitis. This is a redness and swelling (inflammation) in the esophagus. It is caused by an environmental trigger such as a food allergy. It can lead to pain, trouble swallowing, and strictures.  · Less common causes of stricture. Other causes of stricture include radiation treatment and cancer.  Before you have esophageal dilation  · Tell your provider about any medicines you take. This includes prescription medicines, over-the-counter medicines, herbs, vitamins, and other supplements. Be sure to mention aspirin or any blood thinners youre taking.  · Let your provider know if you need to take antibiotics before dental procedures. You may need to take them before esophageal dilation as well.  · Tell your provider about any health conditions you have, such as heart or lung disease. Also mention any allergies to medicines.  · Youll need to have an empty stomach for  the procedure. Follow your providers instructions for not eating and drinking before the procedure.  · Arrange to have a family member or friend drive you home after the procedure.  During the procedure  · You may be given local anesthesia to numb your throat. Youll also likely be given medicine to relax you. The procedure takes about 15 minutes. It does not cause trouble breathing.  · A tube called an endoscope (scope) is used. This is a narrow tube with a tiny light and camera at the end. The scope is inserted through your mouth and into your esophagus. It lets your provider see inside your esophagus. To help guide your provider, an imaging method called fluoroscopy may also be used. This creates a moving X-ray image on a computer screen.   · Next, special tiny tools are carefully guided through your mouth and down into the esophagus. They widen the stricture and are then removed. Different types of instruments are used. The type used depends on the size and cause of the stricture. Types include:  ¨ Balloon dilator. A tiny empty balloon is put into the stricture using an endoscope. The balloon is slowly filled with air. The air is removed from the balloon when the stricture is widened to the right size. Balloon dilators are used to treat many types of strictures.  ¨ Guided wire dilator. A thin wire is eased down the esophagus. A small tube thats wider on one end is guided down the wire. It is put into the stricture to stretch it. These dilators are used to treat all kinds of strictures.  ¨ Bougies. These are weighted, cone-shaped tubes. Starting with smaller cones, your provider uses increasingly larger cones until the stricture is stretched the right amount. Bougies are often used to treat strictures that are simple (short, straight, and not very narrow).  After the procedure  · Youll be watched closely until your provider says youre ready to go home. Youll need to have a friend or family member drive you  home.  · You may have a sore throat for the rest of the day.  · You may have pain behind your breastbone for a short time afterwards.  · You can start drinking fluids again after the numbness in your throat goes away. You can resume eating the same day or the next day.  Risks and possible complications  Risks and possible complications for esophageal dilation include:  · Infection  · A tear or hole in the esophagus lining, causing bleeding and possibly needing surgery to fix  · Risks of anesthesia  Follow-up  You may need to have the procedure repeated one or more times. This depends on the cause and extent of the narrowing. Repeat procedures can allow the dilation to take place more slowly. This reduces the risks of the procedure.  If your stricture was caused by reflux esophagitis, youll likely need to take medicine to treat that condition. Your provider will tell you more.  When to call your provider  Call your healthcare provider right away if you have any of the following after the procedure:  · Fever of 100.4°F (38.0°C)  · Chest pain  · Trouble swallowing  · Vomiting blood or material that looks like coffee grounds  · Bleeding  · Black, tarry, or bloody stools   Date Last Reviewed: 7/1/2016  © 4452-3566 [employer]. [address] [place] [address], [place] [address]. All rights reserved. This information is not intended as a substitute for professional medical care. Always follow your healthcare professional's instructions.

## 2018-03-22 NOTE — H&P
History & Physical - Short Stay  Gastroenterology      SUBJECTIVE:     Procedure: EGD    Chief Complaint/Indication for Procedure: Dysphagia    PTA Medications   Medication Sig    acetaminophen (TYLENOL) 500 MG tablet Take 500 mg by mouth every 6 (six) hours as needed for Pain.    albuterol (PROAIR HFA) 90 mcg/actuation inhaler INHALE 1 PUFF EVERY 4 HOURS PRN for Wheezing    albuterol (PROVENTIL) 2.5 mg /3 mL (0.083 %) nebulizer solution Take 3 mLs (2.5 mg total) by nebulization every 6 (six) hours as needed for Wheezing. Rescue    aripiprazole (ABILIFY) 5 MG Tab Take 5 mg by mouth once daily.    azelastine (ASTELIN) 137 mcg (0.1 %) nasal spray 2 sprays (274 mcg total) by Nasal route 2 (two) times daily as needed for Rhinitis.    beclomethasone (QVAR) 80 mcg/actuation Aero Inhale 2 puffs into the lungs once daily. Controller    fluticasone (FLONASE) 50 mcg/actuation nasal spray 2 sprays (100 mcg total) by Each Nare route once daily.    inhalation device (BREATHERITE VALVED MDI SPACER) Use as directed for inhalation.    levothyroxine (SYNTHROID) 88 MCG tablet TAKE 1 TABLET(88 MCG) BY MOUTH EVERY DAY    lisdexamfetamine (VYVANSE) 40 MG Cap Take 40 mg by mouth once daily.    montelukast (SINGULAIR) 10 mg tablet TAKE 1 TABLET(10 MG) BY MOUTH EVERY DAY    norethindrone-ethinyl estradiol (MICROGESTIN 1/20) 1-20 mg-mcg per tablet Take 1 tablet by mouth once daily.    ranitidine (ZANTAC) 300 MG tablet Take 1 tablet (300 mg total) by mouth every evening.    trazodone (DESYREL) 100 MG tablet Take 100 mg by mouth nightly as needed for Insomnia.    clobetasol (TEMOVATE) 0.05 % cream Apply 1 application topically daily as needed.     DILTIAZEM HCL (DILTIAZEM 2% CREAM) Apply topically 3 (three) times daily. Apply topically to anal area.    diphenhydrAMINE (BENADRYL) 50 MG capsule Take 1 capsule (50 mg total) by mouth every 6 (six) hours as needed for Itching or Allergies.    triamcinolone acetonide 0.1%  (KENALOG) 0.1 % ointment AAA bid x 2 weeks then prn, avoid chronic use       Review of patient's allergies indicates:   Allergen Reactions    Grapefruit Anaphylaxis    Pineapple Anaphylaxis    Prednisolone Anaphylaxis    Augmentin [amoxicillin-pot clavulanate] Hives    Penicillins Hives    Tamiflu [oseltamivir] Diarrhea and Nausea And Vomiting     Pt states allergy to Theraflu.        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; casted B    Bipolar disorder     sees psych    Chronic constipation     Depression     sees psych    Eczema     GERD (gastroesophageal reflux disease)     Growth hormone deficiency     tx with growth hormone ages 12 - 15 by Dr Brewer    Heart murmur     saw cardiology 9/2014, intermittent, asymptomatic    Hemorrhoid     Hypothyroidism     sees Dr Brewer (endo)    Insomnia     Learning disability     NOLAN (obstructive sleep apnea)     does not use CPap    Speech abnormality      Past Surgical History:   Procedure Laterality Date    ADENOIDECTOMY  age 4    COLONOSCOPY N/A 1/17/2018    Procedure: COLONOSCOPY;  Surgeon: Rubio Nguyen MD;  Location: [facility];  Service: Endoscopy;  Laterality: N/A;    FOOT SURGERY  11/13/2014. 3/2015    L foot    FOOT SURGERY Right 10/2014    Right foot surgery    KNEE SURGERY  right/11/2015    KNEE SURGERY Right 03/09/2017    injury to right knee    MULTIPLE TOOTH EXTRACTIONS      MUSCLE RELEASE Bilateral     Lower extremities/ calf due to congenital club feet    TYMPANOSTOMY TUBE PLACEMENT  age 4    UPPER GASTROINTESTINAL ENDOSCOPY  05/2016    Dr. Nguyen    UPPER GASTROINTESTINAL ENDOSCOPY  09/05/2017    Dr. Nguyen     Family History   Problem Relation Age of Onset    Diabetes Mother     Heart disease Mother      Thyroid disease Mother     Depression Mother     Asthma Mother     Arthritis Mother     Allergic rhinitis Mother     Colon polyps Mother     Diverticulitis Mother     Depression Father     Migraines Father     Thyroid disease Father     PAVEL disease Father     Early death Brother       1 hour after birth    Thyroid disease Maternal Grandmother     Seizures Maternal Grandmother     Clotting disorder Maternal Grandmother     Pulmonary fibrosis Maternal Grandfather     Colon cancer Maternal Grandfather     Diabetes Paternal Grandmother     Arthritis Paternal Grandmother     Schizophrenia Paternal Grandmother     Depression Paternal Grandmother     Early death Paternal Grandfather 54    Aneurysm Paternal Grandfather     Crohn's disease Maternal Cousin     Angioedema Neg Hx     Immunodeficiency Neg Hx     Urticaria Neg Hx     Collagen disease Neg Hx     Ulcerative colitis Neg Hx     Celiac disease Neg Hx     Eczema Neg Hx      Social History   Substance Use Topics    Smoking status: Passive Smoke Exposure - Never Smoker     Last attempt to quit: 2014    Smokeless tobacco: Never Used    Alcohol use Yes      Comment: rare         OBJECTIVE:     Vital Signs (Most Recent)  Temp: 97.3 °F (36.3 °C) (18 1132)  Pulse: 66 (18 1132)  Resp: 18 (18 1132)  BP: (!) 103/57 (18 1132)  SpO2: 96 % (18 1132)    Physical Exam:                                                       GENERAL:  Comfortable, in no acute distress.                                 HEENT EXAM:  Nonicteric.  No adenopathy.  Oropharynx is clear.               NECK:  Supple.                                                               LUNGS:  Clear.                                                               CARDIAC:  Regular rate and rhythm.  S1, S2.  No murmur.                      ABDOMEN:  Soft, positive bowel sounds, nontender.  No hepatosplenomegaly or masses.  No rebound or guarding.                                              EXTREMITIES:  No edema.     MENTAL STATUS:  Normal, alert and oriented.      ASSESSMENT/PLAN:     Assessment: Dysphagia    Plan: EGD    Anesthesia Plan: General    ASA Grade: ASA 2 - Patient with mild systemic disease with no functional limitations    MALLAMPATI SCORE:  I (soft palate, uvula, fauces, and tonsillar pillars visible)

## 2018-03-22 NOTE — DISCHARGE SUMMARY
Discharge Note  Short Stay      SUMMARY     Admit Date: 3/22/2018    Attending Physician: Rubio Nguyen MD     Discharge Physician: Rubio Nguyen MD    Discharge Date: 3/22/2018 12:31 PM    Final Diagnosis: Dysphagia, unspecified type [R13.10]    Disposition: HOME OR SELF CARE    Patient Instructions:   Current Discharge Medication List      START taking these medications    Details   pantoprazole (PROTONIX) 40 MG tablet Take 1 tablet (40 mg total) by mouth once daily.  Qty: 30 tablet, Refills: 2         CONTINUE these medications which have NOT CHANGED    Details   acetaminophen (TYLENOL) 500 MG tablet Take 500 mg by mouth every 6 (six) hours as needed for Pain.      albuterol (PROAIR HFA) 90 mcg/actuation inhaler INHALE 1 PUFF EVERY 4 HOURS PRN for Wheezing  Qty: 8.5 g, Refills: 2      albuterol (PROVENTIL) 2.5 mg /3 mL (0.083 %) nebulizer solution Take 3 mLs (2.5 mg total) by nebulization every 6 (six) hours as needed for Wheezing. Rescue  Qty: 1 Box, Refills: 1      aripiprazole (ABILIFY) 5 MG Tab Take 5 mg by mouth once daily.      azelastine (ASTELIN) 137 mcg (0.1 %) nasal spray 2 sprays (274 mcg total) by Nasal route 2 (two) times daily as needed for Rhinitis.  Qty: 90 mL, Refills: 4      beclomethasone (QVAR) 80 mcg/actuation Aero Inhale 2 puffs into the lungs once daily. Controller  Qty: 3 each, Refills: 4      fluticasone (FLONASE) 50 mcg/actuation nasal spray 2 sprays (100 mcg total) by Each Nare route once daily.  Qty: 48 g, Refills: 4      inhalation device (BREATHERITE VALVED MDI SPACER) Use as directed for inhalation.  Qty: 1 Device, Refills: 0      levothyroxine (SYNTHROID) 88 MCG tablet TAKE 1 TABLET(88 MCG) BY MOUTH EVERY DAY  Qty: 30 tablet, Refills: 5      lisdexamfetamine (VYVANSE) 40 MG Cap Take 40 mg by mouth once daily.      montelukast (SINGULAIR) 10 mg tablet TAKE 1 TABLET(10 MG) BY MOUTH EVERY DAY  Qty: 90 tablet, Refills: 4      norethindrone-ethinyl estradiol (MICROGESTIN  1/20) 1-20 mg-mcg per tablet Take 1 tablet by mouth once daily.  Qty: 30 tablet, Refills: 11    Associated Diagnoses: Irregular periods      ranitidine (ZANTAC) 300 MG tablet Take 1 tablet (300 mg total) by mouth every evening.  Qty: 30 tablet, Refills: 2      trazodone (DESYREL) 100 MG tablet Take 100 mg by mouth nightly as needed for Insomnia.      clobetasol (TEMOVATE) 0.05 % cream Apply 1 application topically daily as needed.       DILTIAZEM HCL (DILTIAZEM 2% CREAM) Apply topically 3 (three) times daily. Apply topically to anal area.  Qty: 30 g, Refills: 0      diphenhydrAMINE (BENADRYL) 50 MG capsule Take 1 capsule (50 mg total) by mouth every 6 (six) hours as needed for Itching or Allergies.  Qty: 20 capsule, Refills: 0      triamcinolone acetonide 0.1% (KENALOG) 0.1 % ointment AAA bid x 2 weeks then prn, avoid chronic use  Qty: 454 g, Refills: 1    Associated Diagnoses: Atopic dermatitis             Discharge Procedure Orders (must include Diet, Follow-up, Activity)    Follow Up:  Follow up with PCP as previously scheduled  Resume routine diet.  Activity as tolerated.    No driving day of procedure.

## 2018-03-22 NOTE — ANESTHESIA POSTPROCEDURE EVALUATION
Anesthesia Post Evaluation    Patient: Carlos Coulter    Procedure(s) Performed: Procedure(s) (LRB):  ESOPHAGOGASTRODUODENOSCOPY (EGD) (N/A)    Final Anesthesia Type: general  Patient location during evaluation: PACU  Patient participation: Yes- Able to Participate  Level of consciousness: awake and alert and oriented  Post-procedure vital signs: reviewed and stable  Pain management: adequate  Airway patency: patent  PONV status at discharge: No PONV  Anesthetic complications: no      Cardiovascular status: blood pressure returned to baseline and stable  Respiratory status: unassisted and spontaneous ventilation  Hydration status: euvolemic  Follow-up not needed.        Visit Vitals  BP (!) 118/57 (BP Location: Left arm, Patient Position: Lying)   Pulse 71   Temp 36.3 °C (97.4 °F) (Skin)   Resp 17   Ht 5' (1.524 m)   Wt 77.1 kg (170 lb)   LMP 02/28/2018   SpO2 97%   Breastfeeding? No   BMI 33.20 kg/m²       Pain/Nae Score: Pain Assessment Performed: Yes (3/22/2018 12:45 PM)  Presence of Pain: denies (3/22/2018 12:45 PM)  Nae Score: 9 (3/22/2018 12:45 PM)

## 2018-03-22 NOTE — ANESTHESIA PREPROCEDURE EVALUATION
03/22/2018  Carlos Coulter is a 27 y.o., female.    Anesthesia Evaluation    I have reviewed the Patient Summary Reports.    I have reviewed the Nursing Notes.   I have reviewed the Medications.     Review of Systems  Anesthesia Hx:  No problems with previous Anesthesia    Social:  Non-Smoker    Cardiovascular:   Valvular problems/Murmurs    Pulmonary:   Asthma Sleep Apnea    Renal/:  Renal/ Normal     Hepatic/GI:   GERD    Neurological:  Neurology Normal    Endocrine:   Hypothyroidism    Psych:   Psychiatric History (Bipolar & ADHD) depression          Physical Exam  General:  Well nourished    Airway/Jaw/Neck:  Airway Findings: Mouth Opening: Normal Tongue: Normal  General Airway Assessment: Adult  Oropharynx Findings:  Mallampati: II  Jaw/Neck Findings:  Neck ROM: Normal ROM     Eyes/Ears/Nose:  Eyes/Ears/Nose Findings:    Dental:  Dental Findings:   Chest/Lungs:  Chest/Lungs Findings: Normal Respiratory Rate     Heart/Vascular:  Heart Findings: Rate: Normal  Rhythm: Regular Rhythm        Mental Status:  Mental Status Findings:  Cooperative, Alert and Oriented         Anesthesia Plan  Type of Anesthesia, risks & benefits discussed:  Anesthesia Type:  general  Patient's Preference:   Intra-op Monitoring Plan:   Intra-op Monitoring Plan Comments:   Post Op Pain Control Plan: multimodal analgesia  Post Op Pain Control Plan Comments:   Induction:   IV  Beta Blocker:  Patient is not currently on a Beta-Blocker (No further documentation required).       Informed Consent: Patient understands risks and agrees with Anesthesia plan.  Questions answered. Anesthesia consent signed with patient.  ASA Score: 3     Day of Surgery Review of History & Physical:  There are no significant changes.   H&P completed by Anesthesiologist.   Anesthesia Plan Notes: HCG Negative        Ready For Surgery From  Anesthesia Perspective.

## 2018-04-03 ENCOUNTER — TELEPHONE (OUTPATIENT)
Dept: FAMILY MEDICINE | Facility: CLINIC | Age: 28
End: 2018-04-03

## 2018-04-03 ENCOUNTER — OFFICE VISIT (OUTPATIENT)
Dept: PRIMARY CARE CLINIC | Facility: CLINIC | Age: 28
End: 2018-04-03
Payer: MEDICAID

## 2018-04-03 VITALS
HEIGHT: 60 IN | HEART RATE: 72 BPM | SYSTOLIC BLOOD PRESSURE: 100 MMHG | DIASTOLIC BLOOD PRESSURE: 80 MMHG | BODY MASS INDEX: 33.41 KG/M2 | WEIGHT: 170.19 LBS

## 2018-04-03 DIAGNOSIS — M25.531 RIGHT WRIST PAIN: Primary | ICD-10-CM

## 2018-04-03 DIAGNOSIS — S66.911A STRAIN OF RIGHT WRIST, INITIAL ENCOUNTER: ICD-10-CM

## 2018-04-03 PROCEDURE — 99214 OFFICE O/P EST MOD 30 MIN: CPT | Mod: S$PBB,,, | Performed by: NURSE PRACTITIONER

## 2018-04-03 PROCEDURE — 99999 PR PBB SHADOW E&M-EST. PATIENT-LVL V: CPT | Mod: PBBFAC,,, | Performed by: NURSE PRACTITIONER

## 2018-04-03 PROCEDURE — 99215 OFFICE O/P EST HI 40 MIN: CPT | Mod: PBBFAC,PO | Performed by: NURSE PRACTITIONER

## 2018-04-03 RX ORDER — LEVOTHYROXINE SODIUM 88 UG/1
TABLET ORAL
Qty: 30 TABLET | Refills: 3 | Status: SHIPPED | OUTPATIENT
Start: 2018-04-03 | End: 2018-07-27 | Stop reason: SDUPTHER

## 2018-04-03 NOTE — PROGRESS NOTES
Carlos Coulter is a 27 y.o. female patient of Dr. Fanta Schuster MD who presents to the clinic today for   Chief Complaint   Patient presents with    Wrist Pain     right   .    HPI      Pt, who is known to me, reports a new problem to me:  Right wrist pain that started on awakening 3/29/18.  Tried ice packs, hot and cold packs, exercises to loosen it up.  No injury recalled.  Notes it's swollen  May have hit the wall when she rolled over during her sleep.  Works cleaning houses.  Last time that she worked was 3/30/18.    These symptoms began 5 days ago and status is unchanged.     Pt denies the following symptoms:  Injury    Aggravating factors include moving, grabbing an object, lifting .    Relieving factors include nothing .    OTC Medications tried are ibuprofen--helped her rest for 4 hrs.    Prescription medications taken for symptoms are none.    Pertinent medical history:  Previous injuries or pain in the right wrist.    ROS    Constitutional:  No fever.    HEENT:  Negative    Heart/Lung:  No new sxs    GI/:  No new sxs.    MS:  See Chief Complaint/HPI    Skin:  No rashes, itching..      PAST MEDICAL HISTORY:  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; casted B    Bipolar disorder     sees psych    Chronic constipation     Depression     sees psych    Eczema     GERD (gastroesophageal reflux disease)     Growth hormone deficiency     tx with growth hormone ages 12 - 15 by Dr Brewer    Heart murmur     saw cardiology 9/2014, intermittent, asymptomatic    Hemorrhoid     Hypothyroidism     sees Dr Brewer (endo)    Insomnia     Learning disability     NOLAN (obstructive sleep apnea)     does not use CPap    Speech abnormality        PAST SURGICAL HISTORY:  Past Surgical  History:   Procedure Laterality Date    ADENOIDECTOMY  age 4    COLONOSCOPY N/A 2018    Procedure: COLONOSCOPY;  Surgeon: Rubio Nguyen MD;  [facility];  Service: Endoscopy;  Laterality: N/A;    FOOT SURGERY  2014. 3/2015    L foot    FOOT SURGERY Right 10/2014    Right foot surgery    KNEE SURGERY  right/2015    KNEE SURGERY Right 2017    injury to right knee    MULTIPLE TOOTH EXTRACTIONS      MUSCLE RELEASE Bilateral     Lower extremities/ calf due to congenital club feet    TYMPANOSTOMY TUBE PLACEMENT  age 4    UPPER GASTROINTESTINAL ENDOSCOPY  2016    Dr. Nguyen    UPPER GASTROINTESTINAL ENDOSCOPY  2017    Dr. Nguyen       SOCIAL HISTORY:  Social History     Social History    Marital status:      Spouse name: N/A    Number of children: N/A    Years of education: N/A     Occupational History    stay at home      Social History Main Topics    Smoking status: Passive Smoke Exposure - Never Smoker     Last attempt to quit: 2014    Smokeless tobacco: Never Used    Alcohol use Yes      Comment: rare    Drug use: No    Sexual activity: Yes     Partners: Male     Other Topics Concern    Not on file     Social History Narrative     on 2015; trying to conceive       FAMILY HISTORY:  Family History   Problem Relation Age of Onset    Diabetes Mother     Heart disease Mother     Thyroid disease Mother     Depression Mother     Asthma Mother     Arthritis Mother     Allergic rhinitis Mother     Colon polyps Mother     Diverticulitis Mother     Depression Father     Migraines Father     Thyroid disease Father     PAVEL disease Father     Early death Brother       1 hour after birth    Thyroid disease Maternal Grandmother     Seizures Maternal Grandmother     Clotting disorder Maternal Grandmother     Pulmonary fibrosis Maternal Grandfather     Colon cancer Maternal Grandfather     Diabetes Paternal Grandmother      Arthritis Paternal Grandmother     Schizophrenia Paternal Grandmother     Depression Paternal Grandmother     Early death Paternal Grandfather 54    Aneurysm Paternal Grandfather     Crohn's disease Maternal Cousin     Angioedema Neg Hx     Immunodeficiency Neg Hx     Urticaria Neg Hx     Collagen disease Neg Hx     Ulcerative colitis Neg Hx     Celiac disease Neg Hx     Eczema Neg Hx        ALLERGIES AND MEDICATIONS: updated and reviewed.  Review of patient's allergies indicates:   Allergen Reactions    Grapefruit Anaphylaxis    Pineapple Anaphylaxis    Prednisolone Anaphylaxis    Augmentin [amoxicillin-pot clavulanate] Hives    Penicillins Hives    Tamiflu [oseltamivir] Diarrhea and Nausea And Vomiting     Pt states allergy to Theraflu.     Current Outpatient Prescriptions   Medication Sig Dispense Refill    acetaminophen (TYLENOL) 500 MG tablet Take 500 mg by mouth every 6 (six) hours as needed for Pain.      albuterol (PROAIR HFA) 90 mcg/actuation inhaler INHALE 1 PUFF EVERY 4 HOURS PRN for Wheezing 8.5 g 2    albuterol (PROVENTIL) 2.5 mg /3 mL (0.083 %) nebulizer solution Take 3 mLs (2.5 mg total) by nebulization every 6 (six) hours as needed for Wheezing. Rescue 1 Box 1    aripiprazole (ABILIFY) 5 MG Tab Take 5 mg by mouth once daily.      azelastine (ASTELIN) 137 mcg (0.1 %) nasal spray 2 sprays (274 mcg total) by Nasal route 2 (two) times daily as needed for Rhinitis. 90 mL 4    beclomethasone (QVAR) 80 mcg/actuation Aero Inhale 2 puffs into the lungs once daily. Controller 3 each 4    clobetasol (TEMOVATE) 0.05 % cream Apply 1 application topically daily as needed.       DILTIAZEM HCL (DILTIAZEM 2% CREAM) Apply topically 3 (three) times daily. Apply topically to anal area. 30 g 0    diphenhydrAMINE (BENADRYL) 50 MG capsule Take 1 capsule (50 mg total) by mouth every 6 (six) hours as needed for Itching or Allergies. 20 capsule 0    fluticasone (FLONASE) 50 mcg/actuation nasal  spray 2 sprays (100 mcg total) by Each Nare route once daily. 48 g 4    inhalation device (BREATHERITE VALVED MDI SPACER) Use as directed for inhalation. 1 Device 0    levothyroxine (SYNTHROID) 88 MCG tablet TAKE 1 TABLET(88 MCG) BY MOUTH EVERY DAY 30 tablet 5    lisdexamfetamine (VYVANSE) 40 MG Cap Take 40 mg by mouth once daily.      montelukast (SINGULAIR) 10 mg tablet TAKE 1 TABLET(10 MG) BY MOUTH EVERY DAY 90 tablet 4    norethindrone-ethinyl estradiol (MICROGESTIN 1/20) 1-20 mg-mcg per tablet Take 1 tablet by mouth once daily. 30 tablet 11    pantoprazole (PROTONIX) 40 MG tablet Take 1 tablet (40 mg total) by mouth once daily. 30 tablet 2    ranitidine (ZANTAC) 300 MG tablet Take 1 tablet (300 mg total) by mouth every evening. 30 tablet 2    trazodone (DESYREL) 100 MG tablet Take 100 mg by mouth nightly as needed for Insomnia.      triamcinolone acetonide 0.1% (KENALOG) 0.1 % ointment AAA bid x 2 weeks then prn, avoid chronic use 454 g 1     No current facility-administered medications for this visit.          PHYSICAL EXAM    Alert, coop 27 y.o. female patient in no acute distress.    Vitals:    04/03/18 1304   BP: 100/80   Pulse: 72     VS reviewed.  VS stable.  CC, nursing note, medications & PMH all reviewed today.    Head:  Normocephalic, atraumatic.    EENT: Ext nose/ears normal.  Eyes with normal lids, not injected.           Resp:  Respirations even, unlabored.     Heart:  Normal rate.  CV:  Cap RF brisk, radial pulses 3+ bilat.      MS:  The wrist of the right UE is/are noted to have mild edema, no erythema, no ecchymosis.  The affected area is tender to palp.  Pain elicited with palp, hyperextension .  Pain is noted in the wrist joint(s).  ROM of the affected area is full.  ROM of the UEs is symmetrical.   are intact bilat, lighter on right (fear of pain).  The other joints and areas of the UE are without erythema, edema, ecchymosis or pain.  Neg Finkelstein.               NEURO:  Alert  and oriented x 4.  Responds appropriately during interaction.                  Sensation to light touch intact over RUE.                    Skin:  Warm, dry, color good.    Psych:  Responds appropriately throughout the visit.               Relaxed.  Well-groomed.    Right wrist pain  -     WRIST BRACE FOR HOME USE    Strain of right wrist, initial encounter      Pt today presents with right wrist pain for 6 days w/o known injury.  Mild edema, no erythema/ecchymosis, full ROM.    This is a new problem to me.  No work up is planned.        Pt advised to perform comfort measures recommended on patient instruction sheet .    If not better in 7 days, the patient is advised to call us.  If worse or concerns, the patient is advised to call us.  Explained exam findings, diagnosis and treatment plan to patient.  Questions answered and patient states understanding.

## 2018-04-03 NOTE — TELEPHONE ENCOUNTER
----- Message from Letty Her sent at 4/2/2018  3:32 PM CDT -----  Contact: pt  Pt calling states wants to be seen 4/5 or after as soon as possible for possible sprain hand bottom not top/hurt when move. Nothing is coming un in the system till 5/10..[phone] (home)

## 2018-04-03 NOTE — PATIENT INSTRUCTIONS
Your exam and symptoms today are consistent with right wrist sprain    Use Advil or Ibuprofen tablets for inflammation and pain.    Get the wrist splint fitted by the ortho tech.    Comfort measures:    Muscle/joint rubs like Biofreeze, Krish Cordova, Aspercreme, Salonpas or generic.  Ask the pharmacist for assistance in choosing the generic equivalent at that store.    Ice or heat to the area.    Activity as tolerated.    Ace wrap/splint to right wrist for up to 7 days or until improved.    If you are not better in 7 days, if worse or you have concerns or questions, please do not hesitate to call.  You can reach us at [phone] Monday through Thursday (except holidays) 10 a.m. to 6 p.m.    Thank you for using the [facility]!    BEULAH Puentes, CNP, FNP-BC  [facility]

## 2018-04-06 RX ORDER — ALBUTEROL SULFATE 90 UG/1
AEROSOL, METERED RESPIRATORY (INHALATION)
Qty: 18 G | Refills: 0 | Status: SHIPPED | OUTPATIENT
Start: 2018-04-06 | End: 2018-05-02 | Stop reason: SDUPTHER

## 2018-04-09 ENCOUNTER — TELEPHONE (OUTPATIENT)
Dept: PRIMARY CARE CLINIC | Facility: CLINIC | Age: 28
End: 2018-04-09

## 2018-04-09 DIAGNOSIS — M25.531 RIGHT WRIST PAIN: Primary | ICD-10-CM

## 2018-04-09 NOTE — TELEPHONE ENCOUNTER
----- Message from Letty Her sent at 4/7/2018 11:27 AM CDT -----  Contact: pt  Pt is calling wanting to be seen for her wrist on 4/10,states that it is not getting any better and NP had told her if no better to give her a call and come in. We are not pulling up any available appointments at any location..[phone] (home)

## 2018-04-09 NOTE — TELEPHONE ENCOUNTER
Called pt, she is still having right wrist pain. She did get the brace, only takes it of when taking a bath or watching TV. When moves wrist it hurts worse. Please advise.

## 2018-04-12 DIAGNOSIS — M25.531 RIGHT WRIST PAIN: Primary | ICD-10-CM

## 2018-04-15 DIAGNOSIS — R13.14 PHARYNGOESOPHAGEAL DYSPHAGIA: ICD-10-CM

## 2018-04-15 DIAGNOSIS — K21.00 GASTROESOPHAGEAL REFLUX DISEASE WITH ESOPHAGITIS: ICD-10-CM

## 2018-04-15 DIAGNOSIS — R12 HEARTBURN: ICD-10-CM

## 2018-04-15 DIAGNOSIS — R07.9 NONSPECIFIC CHEST PAIN: ICD-10-CM

## 2018-04-16 RX ORDER — PANTOPRAZOLE SODIUM 40 MG/1
TABLET, DELAYED RELEASE ORAL
Qty: 30 TABLET | Refills: 0 | Status: SHIPPED | OUTPATIENT
Start: 2018-04-16

## 2018-04-17 ENCOUNTER — INITIAL CONSULT (OUTPATIENT)
Dept: PHYSICAL MEDICINE AND REHAB | Facility: CLINIC | Age: 28
End: 2018-04-17
Payer: MEDICAID

## 2018-04-17 ENCOUNTER — HOSPITAL ENCOUNTER (OUTPATIENT)
Dept: RADIOLOGY | Facility: HOSPITAL | Age: 28
Discharge: HOME | End: 2018-04-17
Attending: PHYSICAL MEDICINE & REHABILITATION
Payer: MEDICAID

## 2018-04-17 VITALS
BODY MASS INDEX: 33.38 KG/M2 | DIASTOLIC BLOOD PRESSURE: 69 MMHG | SYSTOLIC BLOOD PRESSURE: 102 MMHG | WEIGHT: 170 LBS | HEIGHT: 60 IN | HEART RATE: 68 BPM

## 2018-04-17 DIAGNOSIS — G56.01 CARPAL TUNNEL SYNDROME ON RIGHT: ICD-10-CM

## 2018-04-17 DIAGNOSIS — M25.531 RIGHT WRIST PAIN: ICD-10-CM

## 2018-04-17 DIAGNOSIS — M25.531 RIGHT WRIST PAIN: Primary | ICD-10-CM

## 2018-04-17 PROCEDURE — 20526 THER INJECTION CARP TUNNEL: CPT | Mod: S$PBB,RT,, | Performed by: PHYSICAL MEDICINE & REHABILITATION

## 2018-04-17 PROCEDURE — 76942 ECHO GUIDE FOR BIOPSY: CPT | Mod: PBBFAC,PN | Performed by: PHYSICAL MEDICINE & REHABILITATION

## 2018-04-17 PROCEDURE — 73110 X-RAY EXAM OF WRIST: CPT | Mod: 26,RT,, | Performed by: RADIOLOGY

## 2018-04-17 PROCEDURE — 76882 US LMTD JT/FCL EVL NVASC XTR: CPT | Mod: PBBFAC,PN | Performed by: PHYSICAL MEDICINE & REHABILITATION

## 2018-04-17 PROCEDURE — 99213 OFFICE O/P EST LOW 20 MIN: CPT | Mod: PBBFAC,25,PN | Performed by: PHYSICAL MEDICINE & REHABILITATION

## 2018-04-17 PROCEDURE — 73110 X-RAY EXAM OF WRIST: CPT | Mod: TC,PO,RT

## 2018-04-17 PROCEDURE — 99204 OFFICE O/P NEW MOD 45 MIN: CPT | Mod: 25,S$PBB,, | Performed by: PHYSICAL MEDICINE & REHABILITATION

## 2018-04-17 PROCEDURE — 99999 PR PBB SHADOW E&M-EST. PATIENT-LVL III: CPT | Mod: PBBFAC,,, | Performed by: PHYSICAL MEDICINE & REHABILITATION

## 2018-04-17 RX ORDER — BETAMETHASONE SODIUM PHOSPHATE AND BETAMETHASONE ACETATE 3; 3 MG/ML; MG/ML
6 INJECTION, SUSPENSION INTRA-ARTICULAR; INTRALESIONAL; INTRAMUSCULAR; SOFT TISSUE
Status: DISCONTINUED | OUTPATIENT
Start: 2018-04-17 | End: 2018-04-17 | Stop reason: HOSPADM

## 2018-04-17 RX ADMIN — Medication 6 MG: at 11:04

## 2018-04-17 NOTE — LETTER
April 17, 2018      Chelsea Chavez NP  [address] [address]           [facility]  [address] [facility] [address] [place] [address]  Phone: [phone]  Fax: [phone]          Patient: Carlos Coulter   MR Number: [id number]   YOB: 1990   Date of Visit: 4/17/2018       Dear Chelsea Chavez:    Thank you for referring Carlos Coulter to me for evaluation. Attached you will find relevant portions of my assessment and plan of care.    If you have questions, please do not hesitate to call me. I look forward to following Carlos Coulter along with you.    Sincerely,    Rick Montoya MD    Enclosure  CC:  No Recipients    If you would like to receive this communication electronically, please contact [email or web address] or [phone] to request more information on [employer] Link access.    For providers and/or their staff who would like to refer a patient to [facility], please contact us through our one-stop-shop provider referral line, [facility], at [phone].    If you feel you have received this communication in error or would no longer like to receive these types of communications, please e-mail [email or web address]

## 2018-04-17 NOTE — PROGRESS NOTES
[facility]    Consulting Provider: Chelsea Chavez    CHIEF COMPLAINT:   Chief Complaint   Patient presents with    Wrist Pain     right wrist pain     HISTORY OF PRESENT ILLNESS: Carlos Coulter is a 27 y.o. female who presents to me for the first time for evaluation of right wrist pain for 2 weeks. Denies trauma or injury. Worse at night. Wakes her up. Dropping objects. Difficulty with turning door knobs. Whole hand numbness. Pain moves up towards elbow. No neck pain. No symptoms on left. She is right-hand dominant. Taking ibuprofen and tylenol. Wearing wrist splint all day and night. No injections before. Pain 5/10 and achy.    Review of Systems   Constitutional: Negative for fever.   HENT: Negative for drooling.    Eyes: Negative for discharge.   Respiratory: Negative for choking.    Cardiovascular: Negative for chest pain.   Genitourinary: Negative for flank pain.   Skin: Negative for wound.   Allergic/Immunologic: Negative for immunocompromised state.   Neurological: Negative for tremors and syncope.   Psychiatric/Behavioral: Negative for behavioral problems.     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; casted B    Bipolar disorder     sees psych    Chronic constipation     Depression     sees psych    Eczema     GERD (gastroesophageal reflux disease)     Growth hormone deficiency     tx with growth hormone ages 12 - 15 by Dr Brewer    Heart murmur     saw cardiology 9/2014, intermittent, asymptomatic    Hemorrhoid     Hypothyroidism     sees Dr Brewer (endo)    Insomnia     Learning disability     NOLAN (obstructive sleep apnea)     does not use CPap    Speech abnormality        Past Surgical History:   Past Surgical History:    Procedure Laterality Date    ADENOIDECTOMY  age 4    COLONOSCOPY N/A 2018    Procedure: COLONOSCOPY;  Surgeon: Rubio Nguyen MD;  Location: [facility];  Service: Endoscopy;  Laterality: N/A;    FOOT SURGERY  2014. 3/2015    L foot    FOOT SURGERY Right 10/2014    Right foot surgery    KNEE SURGERY  right/2015    KNEE SURGERY Right 2017    injury to right knee    MULTIPLE TOOTH EXTRACTIONS      MUSCLE RELEASE Bilateral     Lower extremities/ calf due to congenital club feet    TYMPANOSTOMY TUBE PLACEMENT  age 4    UPPER GASTROINTESTINAL ENDOSCOPY  2016    Dr. Nguyen    UPPER GASTROINTESTINAL ENDOSCOPY  2017    Dr. Nguyen       Family History:   Family History   Problem Relation Age of Onset    Diabetes Mother     Heart disease Mother     Thyroid disease Mother     Depression Mother     Asthma Mother     Arthritis Mother     Allergic rhinitis Mother     Colon polyps Mother     Diverticulitis Mother     Depression Father     Migraines Father     Thyroid disease Father     PAVEL disease Father     Early death Brother       1 hour after birth    Thyroid disease Maternal Grandmother     Seizures Maternal Grandmother     Clotting disorder Maternal Grandmother     Pulmonary fibrosis Maternal Grandfather     Colon cancer Maternal Grandfather     Diabetes Paternal Grandmother     Arthritis Paternal Grandmother     Schizophrenia Paternal Grandmother     Depression Paternal Grandmother     Early death Paternal Grandfather 54    Aneurysm Paternal Grandfather     Crohn's disease Maternal Cousin     Angioedema Neg Hx     Immunodeficiency Neg Hx     Urticaria Neg Hx     Collagen disease Neg Hx     Ulcerative colitis Neg Hx     Celiac disease Neg Hx     Eczema Neg Hx        Medications:   Current Outpatient Prescriptions on File Prior to Visit   Medication Sig Dispense Refill    acetaminophen (TYLENOL) 500 MG tablet Take 500 mg by mouth every 6  (six) hours as needed for Pain.      albuterol (PROVENTIL) 2.5 mg /3 mL (0.083 %) nebulizer solution Take 3 mLs (2.5 mg total) by nebulization every 6 (six) hours as needed for Wheezing. Rescue 1 Box 1    aripiprazole (ABILIFY) 5 MG Tab Take 5 mg by mouth once daily.      azelastine (ASTELIN) 137 mcg (0.1 %) nasal spray 2 sprays (274 mcg total) by Nasal route 2 (two) times daily as needed for Rhinitis. 90 mL 4    beclomethasone (QVAR) 80 mcg/actuation Aero Inhale 2 puffs into the lungs once daily. Controller 3 each 4    clobetasol (TEMOVATE) 0.05 % cream Apply 1 application topically daily as needed.       DILTIAZEM HCL (DILTIAZEM 2% CREAM) Apply topically 3 (three) times daily. Apply topically to anal area. 30 g 0    diphenhydrAMINE (BENADRYL) 50 MG capsule Take 1 capsule (50 mg total) by mouth every 6 (six) hours as needed for Itching or Allergies. 20 capsule 0    fluticasone (FLONASE) 50 mcg/actuation nasal spray 2 sprays (100 mcg total) by Each Nare route once daily. 48 g 4    inhalation device (BREATHERITE VALVED MDI SPACER) Use as directed for inhalation. 1 Device 0    levothyroxine (SYNTHROID) 88 MCG tablet TAKE 1 TABLET(88 MCG) BY MOUTH EVERY DAY 30 tablet 3    lisdexamfetamine (VYVANSE) 40 MG Cap Take 40 mg by mouth once daily.      montelukast (SINGULAIR) 10 mg tablet TAKE 1 TABLET(10 MG) BY MOUTH EVERY DAY 90 tablet 4    norethindrone-ethinyl estradiol (MICROGESTIN 1/20) 1-20 mg-mcg per tablet Take 1 tablet by mouth once daily. 30 tablet 11    pantoprazole (PROTONIX) 40 MG tablet Take 1 tablet (40 mg total) by mouth once daily. 30 tablet 2    pantoprazole (PROTONIX) 40 MG tablet TAKE 1 TABLET(40 MG) BY MOUTH EVERY DAY BEFORE BREAKFAST 30 tablet 0    ranitidine (ZANTAC) 300 MG tablet Take 1 tablet (300 mg total) by mouth every evening. 30 tablet 2    trazodone (DESYREL) 100 MG tablet Take 100 mg by mouth nightly as needed for Insomnia.      triamcinolone acetonide 0.1% (KENALOG) 0.1 %  ointment AAA bid x 2 weeks then prn, avoid chronic use 454 g 1    VENTOLIN HFA 90 mcg/actuation inhaler INHALE 1 PUFF BY MOUTH EVERY 4 HOURS AS NEEDED FOR WHEEZING 18 g 0     No current facility-administered medications on file prior to visit.        Allergies:   Review of patient's allergies indicates:   Allergen Reactions    Grapefruit Anaphylaxis    Pineapple Anaphylaxis    Prednisolone Anaphylaxis    Augmentin [amoxicillin-pot clavulanate] Hives    Penicillins Hives    Tamiflu [oseltamivir] Diarrhea and Nausea And Vomiting     Pt states allergy to Theraflu.       Social History:   Social History     Social History    Marital status:      Spouse name: N/A    Number of children: N/A    Years of education: N/A     Occupational History    stay at home      Social History Main Topics    Smoking status: Passive Smoke Exposure - Never Smoker     Last attempt to quit: 11/20/2014    Smokeless tobacco: Never Used    Alcohol use Yes      Comment: rare    Drug use: No    Sexual activity: Yes     Partners: Male     Other Topics Concern    None     Social History Narrative     on 12/4/2015; trying to conceive     Carlos is . She is accompanied by her father today.    PHYSICAL EXAMINATION:   General    Vitals:    04/17/18 1028   BP: 102/69   Pulse: 68   Weight: 77.1 kg (170 lb)   Height: 5' (1.524 m)     Constitutional: Oriented to person, place, and time. No apparent distress. Appears well-developed and well-nourished. Pleasant.  HENT:   Head: Normocephalic and atraumatic.   Eyes: Right eye exhibits no discharge. Left eye exhibits no discharge. No scleral icterus.   Pulmonary/Chest: Effort normal. No respiratory distress.   Abdominal: There is no guarding.   Neurological: Alert and oriented to person, place, and time.   Psychiatric: Behavior is normal.   Right Hand Exam     Tenderness   The patient is experiencing tenderness in the catalan area.    Range of Motion     Wrist   Extension: 0    Flexion: 70   Pronation: normal   Supination: normal     Muscle Strength   Right wrist normal muscle strength: c/o pain.  Wrist Extension: 4/5   Wrist Flexion: 4/5   : 4/5     Tests   Tinels Sign (Medial Nerve): positive  Finkelstein: negative    Other   Erythema: absent  Scars: absent  Sensation: decreased (Palmar thumb)  Pulse: present    Comments:  Negative López's and Spurling's, DTRs 3+ in BUE      Left Hand Exam   Left hand exam is normal.    Tenderness   The patient is experiencing no tenderness.         Range of Motion     Wrist   Extension: normal   Flexion: normal     Muscle Strength   The patient has normal left wrist strength.  Wrist Extension: 5/5   Wrist Flexion: 5/5   :  5/5     Tests   Phalens Sign: negative    Other   Erythema: absent  Scars: absent  Sensation: normal    Comments:  Negative López's, DTRs 3+ in BUE        INSPECTION: There is no swelling, ecchymoses, erythema or gross deformity of the right hand.    Imaging  Right wrist x-ray images from today: no e/o fx or arthritis    Real-time MSK ultrasound from today: The images were saved will be uploaded to EMR.  The median nerve was observed in short axis and followed into the hand.  There was a flattened appearance of the nerve at the level of the transverse carpal ligament.  The median nerve CSA was enlarged at 0.16 cm².    Data Reviewed: X-ray, ultrasound    Supportive Actions: Independent visualization of images or test specimens    ASSESSMENT:   1. Right wrist pain    2. Carpal tunnel syndrome on right      PLAN:     1. Time was spent reviewing the above diagnosis in depth with Carlos today, including acute management and rehabilitation. History, physical exam, and imaging consistent with right-sided carpal tunnel syndrome.  The right median nerve is enlarged at the level of the carpal tunnel visualized on ultrasound.  Patient in agreement to try an ultrasound-guided corticosteroid injection today to hopefully alleviate  "her symptoms.    2. Right carpal tunnel ultrasound-guided corticosteroid injection in clinic today with median nerve hydrodissection. See separate Procedure Note for details.    3. Continue wrist splinting while sleeping.    4. RTC in 2 months if symptoms fail to improve.    This is a consult from Chelsea Chavez. Please see the "Communications" section of Epic to see how the consulting physician received the report of today's findings and recommendations. If it's an [facility] physician, it will be forwarded to his/her "in basket".    The above note was completed, in part, with the aid of Dragon dictation software/hardware. Translation errors may be present.    "

## 2018-04-17 NOTE — PROCEDURES
Carpal Tunnel  Date/Time: 4/17/2018 11:12 AM  Performed by: BERONICA HOPKINS  Authorized by: BERONICA HOPKINS     Consent Done?:  Yes (Verbal)  Indications:  Pain  Site marked: The procedure site was marked    Timeout: Prior to procedure the correct patient, procedure, and site was verified      Location:  Wrist  Site:  R intercarpal  Prep: Patient was prepped and draped in usual sterile fashion    Ultrasonic Guidance for needle placement: Yes  Images are saved and documented.  Needle size:  27 G  Approach:  Volar (Needle in plane, lateral to medial)  Medications:  6 mg betamethasone acetate-betamethasone sodium phosphate 6 mg/mL  Patient tolerance:  Patient tolerated the procedure well with no immediate complications

## 2018-05-02 ENCOUNTER — OFFICE VISIT (OUTPATIENT)
Dept: ALLERGY | Facility: CLINIC | Age: 28
End: 2018-05-02
Payer: MEDICAID

## 2018-05-02 VITALS — WEIGHT: 168.63 LBS | OXYGEN SATURATION: 99 % | BODY MASS INDEX: 33.11 KG/M2 | HEIGHT: 60 IN | HEART RATE: 72 BPM

## 2018-05-02 DIAGNOSIS — J31.0 CHRONIC RHINITIS: ICD-10-CM

## 2018-05-02 DIAGNOSIS — J45.31 MILD PERSISTENT ASTHMA WITH ACUTE EXACERBATION: Primary | ICD-10-CM

## 2018-05-02 PROCEDURE — 99213 OFFICE O/P EST LOW 20 MIN: CPT | Mod: PBBFAC,PO | Performed by: ALLERGY & IMMUNOLOGY

## 2018-05-02 PROCEDURE — 99999 PR PBB SHADOW E&M-EST. PATIENT-LVL III: CPT | Mod: PBBFAC,,, | Performed by: ALLERGY & IMMUNOLOGY

## 2018-05-02 PROCEDURE — 99214 OFFICE O/P EST MOD 30 MIN: CPT | Mod: S$PBB,,, | Performed by: ALLERGY & IMMUNOLOGY

## 2018-05-02 RX ORDER — FLUTICASONE PROPIONATE 110 UG/1
2 AEROSOL, METERED RESPIRATORY (INHALATION) 2 TIMES DAILY
Qty: 12 G | Refills: 11 | Status: SHIPPED | OUTPATIENT
Start: 2018-05-02 | End: 2019-05-02

## 2018-05-02 RX ORDER — ALBUTEROL SULFATE 90 UG/1
2 AEROSOL, METERED RESPIRATORY (INHALATION) EVERY 4 HOURS PRN
Qty: 18 G | Refills: 2 | Status: SHIPPED | OUTPATIENT
Start: 2018-05-02 | End: 2018-07-28 | Stop reason: SDUPTHER

## 2018-05-02 NOTE — PROGRESS NOTES
Subjective:       Patient ID: Carlos Coulter is a 27 y.o. female.    Chief Complaint:  Asthma (asthma flare)      27 year-old woman presents for continued evaluation of chronic rhinitis and mild asthma. She was last seen 3/14/18. She has had immunocaps drawn which were all negative. She had PFTs done which were normal. However if she does not stay on Q olivia daily she needs albuterol daily. In last month she has been forgetting her Q olivia in husbands car so not taking daily. She uses albuterol as needed but gideon not to as makes her hyper. She normally takes Q olivia 80 2 puffs in AM. No night symptoms. Mostly HARKINS and is ok at rest. Yesterday had cough and wheeze episode. No rhinitis right now.  She is on montelukast but not always every day. She is on Flonase 1 SEN daily. Never used azelastine.   No infections recently.      Prior History taken 7/6/15: new patient evaluation of allergy and asthma. She is accompanied by her mom who is pt here for asthma. She has bipolar and is not best historian but is able to answer all questions, needs to be redirected often.   1. Asthma. She states she ha shad since was a baby. As small child (1-3) she was hospitalized for asthma, never ICU or intubated. Since then not been to ER or hospital. She rarely has flares where needs oral steroids. She does find she gets out of breath easily with walking far or chasing her 3 yo nephew. When this occurs she usually stops and rest and resolves. Rarely uses inhaler. She has Q olivia supposed to be on BID but does not do and rarely uses albuterol. No night symptoms. No oral steroids in many years.  2. Rhinitis. She is much worse in spring and some in summer. She gets congestion. Ears hurt, sore throat, stuffy nose, headaches, runny nose, sneeze and itchy watery eyes. Worse in evening time. Worse outside. Worse around dust and around some flowers. Takes Singulair daily but no antihistamines. Takes Flonase daily but does have days where  forgets. She had ear tubes as baby and had hearing loss as child which recovered. Not sure if ever had allergy test.  3. Food allergy. She reports pineapple caused her tongue and throat to swell and had to rush to ER. Grapefruit causes a rash on face and tongue swelling. She also for past year has trouble swallowing. Yesterday had rice get stuck in her throat. Dad and grandmother have history of dilation. She does often feel something come up in throat, like reflux.  No insect or latex allergy. Med allergies as per card.      Asthma   She complains of cough and shortness of breath. There is no wheezing. Associated symptoms include headaches. Pertinent negatives include no appetite change, chest pain, ear pain, fever, myalgias, postnasal drip, rhinorrhea, sneezing, sore throat or trouble swallowing. Her past medical history is significant for asthma.       Environmental History: Pets in the home: dogs (3) and cats (2).  see history section for home environment  Review of Systems   Constitutional: Negative for activity change, appetite change, chills, fatigue, fever and unexpected weight change.   HENT: Negative for congestion, drooling, ear discharge, ear pain, facial swelling, hearing loss, mouth sores, nosebleeds, postnasal drip, rhinorrhea, sinus pressure, sneezing, sore throat, tinnitus, trouble swallowing and voice change.    Eyes: Negative for photophobia, pain, discharge, itching and visual disturbance.   Respiratory: Positive for cough and shortness of breath. Negative for chest tightness, wheezing and stridor.    Cardiovascular: Negative for chest pain and palpitations.   Gastrointestinal: Negative for abdominal pain, constipation, diarrhea, nausea and vomiting.   Genitourinary: Negative for difficulty urinating.   Musculoskeletal: Negative for arthralgias, joint swelling, myalgias and neck pain.   Skin: Negative for color change, pallor, rash and wound.   Allergic/Immunologic: Positive for environmental  allergies and food allergies. Negative for immunocompromised state.   Neurological: Positive for headaches. Negative for dizziness, syncope, weakness, light-headedness and numbness.   Hematological: Negative for adenopathy. Does not bruise/bleed easily.   Psychiatric/Behavioral: Negative for self-injury and sleep disturbance.        Objective:    Physical Exam   Constitutional: She is oriented to person, place, and time. She appears well-developed and well-nourished. No distress.   HENT:   Head: Normocephalic and atraumatic.   Right Ear: Hearing, tympanic membrane, external ear and ear canal normal.   Left Ear: Hearing, tympanic membrane, external ear and ear canal normal.   Nose: No mucosal edema, rhinorrhea, sinus tenderness or septal deviation. No epistaxis. Right sinus exhibits no maxillary sinus tenderness and no frontal sinus tenderness. Left sinus exhibits no maxillary sinus tenderness and no frontal sinus tenderness.   Mouth/Throat: Uvula is midline, oropharynx is clear and moist and mucous membranes are normal. No uvula swelling.   Eyes: Conjunctivae are normal. Right eye exhibits no discharge. Left eye exhibits no discharge.   Neck: Normal range of motion. No thyromegaly present.   Cardiovascular: Normal rate, regular rhythm and normal heart sounds.    No murmur heard.  Pulmonary/Chest: Effort normal and breath sounds normal. No respiratory distress. She has no wheezes.   Abdominal: Soft. She exhibits no distension. There is no tenderness.   Musculoskeletal: Normal range of motion. She exhibits no edema or tenderness.   Lymphadenopathy:     She has no cervical adenopathy.   Neurological: She is alert and oriented to person, place, and time.   Skin: Skin is warm and dry. No rash noted. No erythema.   Psychiatric: She has a normal mood and affect. Her behavior is normal. Judgment and thought content normal.   Nursing note and vitals reviewed.      Laboratory:   none performed   Assessment:       1. Mild  persistent asthma with acute exacerbation    2. Chronic rhinitis         Plan:       1. resume  Q olivia 80 mcg 2 puff daily but advised needs to be every day, she states insurance issue with this as well so will send in fluticasone 110 2 puffs in AM  2. Continue Singulair daily but need every day and zyrtec or Claritin daily  3. Continue fluticasone 2 SEN daily and add azelastine 2 SEN BID as needed  4. albuterol 2 puffs every 4 hours as needed  5. RTC annually or sooner if needed

## 2018-05-31 ENCOUNTER — TELEPHONE (OUTPATIENT)
Dept: FAMILY MEDICINE | Facility: CLINIC | Age: 28
End: 2018-05-31

## 2018-05-31 NOTE — TELEPHONE ENCOUNTER
----- Message from Darrell Sanderson sent at 5/31/2018 12:36 PM CDT -----  Contact: Patient  Type:  Patient Returning Call    Who Called:  Carlos  Who Left Message for Patient:    Does the patient know what this is regarding?:  n/a  Best Call Back Number:  [phone]  Additional Information:  n/a

## 2018-05-31 NOTE — TELEPHONE ENCOUNTER
Pt was calling regarding headlice, asks if the medication is available OTC. Pt advised that it is. Verbalized understanding. Also advised pt that there are available shampoos and conditioners for the hair that can prevent re infestation. these typically can be found at any store that sells hir products. Pt verbalized understanding advised if these OTC remedies do not work to return call to clinic.

## 2018-05-31 NOTE — TELEPHONE ENCOUNTER
----- Message from Ban Ernst sent at 5/31/2018  9:49 AM CDT -----  Contact: self  Patient requesting call back regarding her head lice.  Please call [phone]

## 2018-06-06 ENCOUNTER — TELEPHONE (OUTPATIENT)
Dept: OTOLARYNGOLOGY | Facility: CLINIC | Age: 28
End: 2018-06-06

## 2018-06-06 NOTE — TELEPHONE ENCOUNTER
----- Message from Ruperto Last sent at 6/5/2018  5:02 PM CDT -----  Contact: Pt  Pt is requesting a call back to schedule an appt due to bilateral ear pain.    Pt stated she would like appt on 6/13/18 if there are times available.    Pt can be reached at [phone].    Thank you!

## 2018-06-14 ENCOUNTER — OFFICE VISIT (OUTPATIENT)
Dept: OTOLARYNGOLOGY | Facility: CLINIC | Age: 28
End: 2018-06-14
Payer: MEDICAID

## 2018-06-14 VITALS — BODY MASS INDEX: 33.19 KG/M2 | WEIGHT: 169.06 LBS | HEIGHT: 60 IN

## 2018-06-14 DIAGNOSIS — H61.23 BILATERAL IMPACTED CERUMEN: Primary | ICD-10-CM

## 2018-06-14 PROCEDURE — 99213 OFFICE O/P EST LOW 20 MIN: CPT | Mod: PBBFAC,PO,25 | Performed by: NURSE PRACTITIONER

## 2018-06-14 PROCEDURE — 69210 REMOVE IMPACTED EAR WAX UNI: CPT | Mod: PBBFAC,PO | Performed by: NURSE PRACTITIONER

## 2018-06-14 PROCEDURE — 99999 PR PBB SHADOW E&M-EST. PATIENT-LVL III: CPT | Mod: PBBFAC,,, | Performed by: NURSE PRACTITIONER

## 2018-06-14 PROCEDURE — 99499 UNLISTED E&M SERVICE: CPT | Mod: S$PBB,,, | Performed by: NURSE PRACTITIONER

## 2018-06-14 PROCEDURE — 69210 REMOVE IMPACTED EAR WAX UNI: CPT | Mod: S$PBB,,, | Performed by: NURSE PRACTITIONER

## 2018-06-14 NOTE — PROGRESS NOTES
"Subjective:       Patient ID: Carlos Coulter is a 27 y.o. female.    Chief Complaint: Cerumen Impaction    HPI   Patient returns today for ear cleaning. She has been seen here in ENT multiple times for referred otalgia, no otogenic cause can be found; clear aural exam with type "A" tympanometry. We have discussed that tympanometry is the "gold standard" for determining presence or absence of middle ear fluid and that no middle ear fluid = no OM. Patient has been encouraged to see a dentist on multiple occasions for advanced gingivitis and numerous dental caries as they are most likely relate to her referred otalgia.      Review of Systems   Constitutional: Negative.    HENT: Negative.    Eyes: Negative.    Respiratory: Negative.    Cardiovascular: Negative.    Gastrointestinal: Negative.  Negative for nausea.   Musculoskeletal: Negative.    Skin: Negative.    Neurological: Negative.    Hematological: Negative.    Psychiatric/Behavioral: Negative.        Objective:      Physical Exam   Constitutional: She is oriented to person, place, and time. Vital signs are normal. She appears well-developed and well-nourished. She is cooperative. She does not appear ill. No distress.   HENT:   Head: Normocephalic and atraumatic.   Right Ear: Hearing, tympanic membrane, external ear and ear canal normal. No drainage or swelling. Tympanic membrane is not perforated and not erythematous. Tympanic membrane mobility is normal. No middle ear effusion.   Left Ear: Hearing, tympanic membrane, external ear and ear canal normal. No drainage or swelling. Tympanic membrane is not perforated and not erythematous. Tympanic membrane mobility is normal.  No middle ear effusion.   Nose: Nose normal. No mucosal edema, rhinorrhea or septal deviation.   Mouth/Throat: Uvula is midline, oropharynx is clear and moist and mucous membranes are normal. Mucous membranes are not pale, not dry and not cyanotic. Abnormal dentition. Dental caries " (poor hygiene, severe gingivitis, numerous caries) present. No oropharyngeal exudate, posterior oropharyngeal edema or posterior oropharyngeal erythema.   Eyes: EOM and lids are normal. Right eye exhibits no discharge. Left eye exhibits no discharge. No scleral icterus.   Neck: Trachea normal and normal range of motion. Neck supple.   Cardiovascular: Normal rate.    Pulmonary/Chest: Effort normal. No respiratory distress. She has no wheezes.   Musculoskeletal: Normal range of motion.   Neurological: She is alert and oriented to person, place, and time. She has normal strength. Coordination and gait normal.   Skin: Skin is warm, dry and intact. No lesion and no rash noted. She is not diaphoretic. No cyanosis. No pallor.   Psychiatric: She has a normal mood and affect. Her speech is normal and behavior is normal. Judgment and thought content normal. Cognition and memory are normal.   Nursing note and vitals reviewed.    Assessment:     Referred otalgia, not otogenic, most likely odontogenic considering advanced gingivitis and dental disease, numerous dental caries  Hearing was WNL AU last year  Bilateral cerumen removed  Plan:     Reassured patient both ears normal without effusion or inflammation. No evidence of OM or OE.

## 2018-06-19 ENCOUNTER — OFFICE VISIT (OUTPATIENT)
Dept: PHYSICAL MEDICINE AND REHAB | Facility: CLINIC | Age: 28
End: 2018-06-19
Payer: MEDICAID

## 2018-06-19 VITALS
HEART RATE: 63 BPM | BODY MASS INDEX: 33.18 KG/M2 | WEIGHT: 169 LBS | HEIGHT: 60 IN | SYSTOLIC BLOOD PRESSURE: 125 MMHG | DIASTOLIC BLOOD PRESSURE: 72 MMHG

## 2018-06-19 DIAGNOSIS — M25.539 PAIN IN WRIST, UNSPECIFIED LATERALITY: Primary | ICD-10-CM

## 2018-06-19 DIAGNOSIS — G56.01 RIGHT CARPAL TUNNEL SYNDROME: ICD-10-CM

## 2018-06-19 PROCEDURE — 99213 OFFICE O/P EST LOW 20 MIN: CPT | Mod: S$PBB,,, | Performed by: PHYSICAL MEDICINE & REHABILITATION

## 2018-06-19 PROCEDURE — 99999 PR PBB SHADOW E&M-EST. PATIENT-LVL III: CPT | Mod: PBBFAC,,, | Performed by: PHYSICAL MEDICINE & REHABILITATION

## 2018-06-19 PROCEDURE — 99213 OFFICE O/P EST LOW 20 MIN: CPT | Mod: PBBFAC,PN | Performed by: PHYSICAL MEDICINE & REHABILITATION

## 2018-06-19 NOTE — PROGRESS NOTES
[facility]    CHIEF COMPLAINT:   Chief Complaint   Patient presents with    Follow-up     right wrist     HISTORY OF PRESENT ILLNESS: Carlos Coulter is a 27 y.o. female who presents to me for f/u evaluation of right wrist pain.     At that visit, we performed a right ultrasound-guided corticosteroid injection with median nerve hydrodissection and continued wrist splint.  She reports only modest temporary relief from the injection last visit.  She reports her symptoms have returned to the previous level.  She complains of wrist pain in entire hand numbness.  She also reports she has continued to drop objects.    Previous HPI on 4/17/18: Right wrist pain x 2 weeks. Denies trauma or injury. Worse at night. Wakes her up. Dropping objects. Difficulty with turning door knobs. Whole hand numbness. Pain moves up towards elbow. No neck pain. No symptoms on left. She is right-hand dominant. Taking ibuprofen and tylenol. Wearing wrist splint all day and night. No injections before. Pain 5/10 and achy.    Review of Systems   Constitutional: Negative for fever.   HENT: Negative for drooling.    Eyes: Negative for discharge.   Respiratory: Negative for choking.    Cardiovascular: Negative for chest pain.   Genitourinary: Negative for flank pain.   Skin: Negative for wound.   Allergic/Immunologic: Negative for immunocompromised state.   Neurological: Negative for tremors and syncope.   Psychiatric/Behavioral: Negative for behavioral problems.     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; casted B    Bipolar disorder     sees psych    Chronic constipation     Depression     sees psych    Eczema     GERD (gastroesophageal reflux disease)     Growth  hormone deficiency     tx with growth hormone ages 12 - 15 by Dr Brewer    Heart murmur     saw cardiology 2014, intermittent, asymptomatic    Hemorrhoid     Hypothyroidism     sees Dr Brewer (endo)    Insomnia     Learning disability     NOLAN (obstructive sleep apnea)     does not use CPap    Speech abnormality        Past Surgical History:   Past Surgical History:   Procedure Laterality Date    ADENOIDECTOMY  age 4    COLONOSCOPY N/A 2018    Procedure: COLONOSCOPY;  Surgeon: Rubio Nguyen MD;  Location: [facility] ENDO;  Service: Endoscopy;  Laterality: N/A;    FOOT SURGERY  2014. 3/2015    L foot    FOOT SURGERY Right 10/2014    Right foot surgery    KNEE SURGERY  right/2015    KNEE SURGERY Right 2017    injury to right knee    MULTIPLE TOOTH EXTRACTIONS      MUSCLE RELEASE Bilateral     Lower extremities/ calf due to congenital club feet    TYMPANOSTOMY TUBE PLACEMENT  age 4    UPPER GASTROINTESTINAL ENDOSCOPY  2016    Dr. Nguyen    UPPER GASTROINTESTINAL ENDOSCOPY  2017    Dr. Nguyen       Family History:   Family History   Problem Relation Age of Onset    Diabetes Mother     Heart disease Mother     Thyroid disease Mother     Depression Mother     Asthma Mother     Arthritis Mother     Allergic rhinitis Mother     Colon polyps Mother     Diverticulitis Mother     Depression Father     Migraines Father     Thyroid disease Father     PAVEL disease Father     Early death Brother          1 hour after birth    Thyroid disease Maternal Grandmother     Seizures Maternal Grandmother     Clotting disorder Maternal Grandmother     Pulmonary fibrosis Maternal Grandfather     Colon cancer Maternal Grandfather     Diabetes Paternal Grandmother     Arthritis Paternal Grandmother     Schizophrenia Paternal Grandmother     Depression Paternal Grandmother     Early death Paternal Grandfather 54    Aneurysm Paternal Grandfather     Crohn's disease  Maternal Cousin     Angioedema Neg Hx     Immunodeficiency Neg Hx     Urticaria Neg Hx     Collagen disease Neg Hx     Ulcerative colitis Neg Hx     Celiac disease Neg Hx     Eczema Neg Hx        Medications:   Current Outpatient Prescriptions on File Prior to Visit   Medication Sig Dispense Refill    acetaminophen (TYLENOL) 500 MG tablet Take 500 mg by mouth every 6 (six) hours as needed for Pain.      albuterol (PROVENTIL) 2.5 mg /3 mL (0.083 %) nebulizer solution Take 3 mLs (2.5 mg total) by nebulization every 6 (six) hours as needed for Wheezing. Rescue 1 Box 1    albuterol (VENTOLIN HFA) 90 mcg/actuation inhaler Inhale 2 puffs into the lungs every 4 (four) hours as needed for Wheezing. Rescue 18 g 2    aripiprazole (ABILIFY) 5 MG Tab Take 5 mg by mouth once daily.      azelastine (ASTELIN) 137 mcg (0.1 %) nasal spray 2 sprays (274 mcg total) by Nasal route 2 (two) times daily as needed for Rhinitis. 90 mL 4    beclomethasone (QVAR) 80 mcg/actuation Aero Inhale 2 puffs into the lungs once daily. Controller 3 each 4    clobetasol (TEMOVATE) 0.05 % cream Apply 1 application topically daily as needed.       DILTIAZEM HCL (DILTIAZEM 2% CREAM) Apply topically 3 (three) times daily. Apply topically to anal area. 30 g 0    diphenhydrAMINE (BENADRYL) 50 MG capsule Take 1 capsule (50 mg total) by mouth every 6 (six) hours as needed for Itching or Allergies. 20 capsule 0    fluticasone (FLONASE) 50 mcg/actuation nasal spray 2 sprays (100 mcg total) by Each Nare route once daily. 48 g 4    fluticasone (FLOVENT HFA) 110 mcg/actuation inhaler Inhale 2 puffs into the lungs 2 (two) times daily. Controller 12 g 11    inhalation device (BREATHERITE VALVED MDI SPACER) Use as directed for inhalation. 1 Device 0    levothyroxine (SYNTHROID) 88 MCG tablet TAKE 1 TABLET(88 MCG) BY MOUTH EVERY DAY 30 tablet 3    lisdexamfetamine (VYVANSE) 40 MG Cap Take 40 mg by mouth once daily.      montelukast (SINGULAIR) 10  mg tablet TAKE 1 TABLET(10 MG) BY MOUTH EVERY DAY 90 tablet 4    pantoprazole (PROTONIX) 40 MG tablet Take 1 tablet (40 mg total) by mouth once daily. 30 tablet 2    pantoprazole (PROTONIX) 40 MG tablet TAKE 1 TABLET(40 MG) BY MOUTH EVERY DAY BEFORE BREAKFAST 30 tablet 0    trazodone (DESYREL) 100 MG tablet Take 100 mg by mouth nightly as needed for Insomnia.      triamcinolone acetonide 0.1% (KENALOG) 0.1 % ointment AAA bid x 2 weeks then prn, avoid chronic use 454 g 1     No current facility-administered medications on file prior to visit.        Allergies:   Review of patient's allergies indicates:   Allergen Reactions    Grapefruit Anaphylaxis    Pineapple Anaphylaxis    Prednisolone Anaphylaxis    Augmentin [amoxicillin-pot clavulanate] Hives    Penicillins Hives    Tamiflu [oseltamivir] Diarrhea and Nausea And Vomiting     Pt states allergy to Theraflu.       Social History:   Social History     Social History    Marital status:      Spouse name: N/A    Number of children: N/A    Years of education: N/A     Occupational History    stay at home      Social History Main Topics    Smoking status: Passive Smoke Exposure - Never Smoker     Last attempt to quit: 11/20/2014    Smokeless tobacco: Never Used    Alcohol use Yes      Comment: rare    Drug use: No    Sexual activity: Yes     Partners: Male     Other Topics Concern    None     Social History Narrative     on 12/4/2015; trying to conceive     Carlos is . She is accompanied by her father today.    PHYSICAL EXAMINATION:   General    Vitals:    06/19/18 1254   Weight: 76.7 kg (169 lb)   Height: 5' (1.524 m)     Constitutional: Oriented to person, place, and time. No apparent distress. Appears well-developed and well-nourished. Pleasant.  HENT:   Head: Normocephalic and atraumatic.   Eyes: Right eye exhibits no discharge. Left eye exhibits no discharge. No scleral icterus.   Pulmonary/Chest: Effort normal. No  respiratory distress.   Abdominal: There is no guarding.   Neurological: Alert and oriented to person, place, and time.   Psychiatric: Behavior is normal.   Right Hand Exam     Tenderness   The patient is experiencing tenderness in the catalan area.    Range of Motion     Wrist   Extension: 0   Flexion: 70   Pronation: normal   Supination: normal     Muscle Strength   Right wrist normal muscle strength: c/o pain.  Wrist Extension: 4/5   Wrist Flexion: 4/5   : 4/5     Tests   Tinels Sign (Medial Nerve): positive  Finkelstein: negative    Other   Erythema: absent  Scars: absent  Sensation: decreased (Palmar thumb)  Pulse: present    Comments:  (+) López's, DTRs 3+ in BUE & BLE. No clonus      Left Hand Exam   Left hand exam is normal.    Tenderness   The patient is experiencing no tenderness.         Range of Motion     Wrist   Extension: normal   Flexion: normal     Muscle Strength   The patient has normal left wrist strength.  Wrist Extension: 5/5   Wrist Flexion: 5/5   :  5/5     Tests   Phalens Sign: negative    Other   Erythema: absent  Scars: absent  Sensation: normal    Comments:  (+)López's, DTRs 3+ in BUE & BLE. No clonus        INSPECTION: There is no swelling, ecchymoses, erythema or gross deformity of the right hand.    Imaging  Right wrist x-ray images: no e/o fx or arthritis    Real-time MSK ultrasound from previous visit: The images were saved will be uploaded to EMR.  The median nerve was observed in short axis and followed into the hand.  There was a flattened appearance of the nerve at the level of the transverse carpal ligament.  The median nerve CSA was enlarged at 0.16 cm².    Data Reviewed: X-ray, ultrasound    Supportive Actions: Independent visualization of images or test specimens    ASSESSMENT:   Encounter Diagnoses   Name Primary?    Pain in wrist, unspecified laterality Yes    Right carpal tunnel syndrome      PLAN:   1. Time was spent reviewing the above diagnosis in depth  with Carlos today, including acute management and rehabilitation. History, physical exam, and imaging consistent with right-sided carpal tunnel syndrome.      2. Patient would like to proceed with possible carpal tunnel release. We will schedule an EMG/NCS of the RUE to assess.     3. Continue wrist splinting while sleeping. Discussed with her to loosen the brace and to only wear at night.     4. RTC for EMG/NCS.    The above note was completed, in part, with the aid of Dragon dictation software/hardware. Translation errors may be present.

## 2018-06-26 ENCOUNTER — OFFICE VISIT (OUTPATIENT)
Dept: PHYSICAL MEDICINE AND REHAB | Facility: CLINIC | Age: 28
End: 2018-06-26
Payer: MEDICAID

## 2018-06-26 DIAGNOSIS — G56.01 RIGHT CARPAL TUNNEL SYNDROME: ICD-10-CM

## 2018-06-26 PROCEDURE — 95886 MUSC TEST DONE W/N TEST COMP: CPT | Mod: 26,S$PBB,, | Performed by: PHYSICAL MEDICINE & REHABILITATION

## 2018-06-26 PROCEDURE — 95911 NRV CNDJ TEST 9-10 STUDIES: CPT | Mod: PBBFAC,PN | Performed by: PHYSICAL MEDICINE & REHABILITATION

## 2018-06-26 PROCEDURE — 95911 NRV CNDJ TEST 9-10 STUDIES: CPT | Mod: 26,S$PBB,, | Performed by: PHYSICAL MEDICINE & REHABILITATION

## 2018-06-26 PROCEDURE — 95886 MUSC TEST DONE W/N TEST COMP: CPT | Mod: PBBFAC,PN | Performed by: PHYSICAL MEDICINE & REHABILITATION

## 2018-06-26 PROCEDURE — 99499 UNLISTED E&M SERVICE: CPT | Mod: S$PBB,,, | Performed by: PHYSICAL MEDICINE & REHABILITATION

## 2018-06-27 NOTE — PROGRESS NOTES
[facility]  [address] [facility]  [place] [address]             Full Name: BALDEMAR CRAIN Gender: Female  Patient ID: [id number] YOB: 1990  History: RIGHT WHOLE HAND NUMB X 3 MONTHS. NO RADICULAR PAIN. OCCASIONAL WEAK IN . WORSE AT NIGHT. DENIES DM & ETOH.      Visit Date: 6/26/2018 13:18  Age: 27 Years 6 Months Old  Examining Physician: KELLIE  Referring Physician: KELLIE      Sensory NCS      Nerve / Sites Rec. Site Onset Lat Peak Lat NP Amp PP Amp Segments Distance Velocity     ms ms µV µV  cm m/s   L Median - Digit III (Antidromic)      Wrist Dig III 2.76 3.33 18.4 64.7 Wrist - Dig III 14 51      Mid palm Dig III 1.20 1.67 56.3 96.8 Mid palm - Dig III 7 58   R Median - Digit III (Antidromic)      Wrist Dig III 2.76 3.39 26.3 69.5 Wrist - Dig III 14 51      Mid palm Dig III 1.09 1.72 50.7 364.4 Mid palm - Dig III 7 64   L Ulnar - Digit V (Antidromic)      Wrist Dig V 2.40 3.02 30.0 52.2 Wrist - Dig V 14 58   R Ulnar - Digit V (Antidromic)      Wrist Dig V 2.40 3.02 31.8 32.1 Wrist - Dig V 14 58       Combined Sensory Index      Nerve / Sites Rec. Site Peak Lat NP Amp PP Amp Segments Peak Diff     ms µV µV  ms   R Median - CSI      Median Thumb 2.92 22.2 123.8 Median - Radial 0.16      Radial Thumb 2.76 8.2 17.3 Median - Ulnar 0.31      Median Ring 3.80 20.9 21.4        Ulnar Ring 3.49 26.6 28.9        CSI     CSI 0.47       Motor NCS      Nerve / Sites Muscle Latency Amplitude Amp % Duration Segments Distance Lat Diff Velocity     ms mV % ms  cm ms m/s   R Median - APB      Wrist APB 4.11 5.2 100 6.82 Wrist - APB 8        Elbow APB 7.81 5.2 100 7.14 Elbow - Wrist 22 3.70 59   L Median - APB      Wrist APB 3.70 8.6 100 7.55 Wrist - APB 8        Elbow APB 7.71 7.7 89.3 10.62 Elbow - Wrist 22 4.01 55   R Ulnar - ADM      Wrist ADM 3.13 6.2 100 6.46 Wrist - ADM 8        B.Elbow ADM 5.63 6.7 107 8.75 B.Elbow - Wrist 16 2.50 64      A.Elbow ADM 7.14 6.9 112 6.72 A.Elbow - B.Elbow 10  1.51 66   L Ulnar - ADM      Wrist ADM 3.44 5.5 100 7.71 Wrist - ADM 8        B.Elbow ADM 5.89 3.9 72.1 7.19 B.Elbow - Wrist 15 2.45 61      A.Elbow ADM 7.50 4.3 77.7 8.59 A.Elbow - B.Elbow 10 1.61 62       EMG         EMG Summary Table     Spontaneous MUAP Recruitment   Muscle IA Fib PSW Fasc H.F. Amp Dur. PPP Pattern   R. Deltoid N None None None None N N N N   R. Biceps brachii N None None None None N N N N   R. Triceps brachii N None None None None N N N N   R. Pronator teres N None None None None N N N N   R. First dorsal interosseous N None None None None N N N N   R. Abductor pollicis brevis N None None None None N N N N   R. Cervical paraspinals N None None None None           Summary    The motor conduction test was normal in all 4 of the tested nerves: R Median - APB, L Median - APB, R Ulnar - ADM, L Ulnar - ADM.    The sensory conduction test was performed on 5 nerve(s). The results were normal in 4 nerve(s): L Median - Digit III (Antidromic), R Median - Digit III (Antidromic), L Ulnar - Digit V (Antidromic), R Ulnar - Digit V (Antidromic). Findings were unremarkable in 1 nerve(s): R Median - CSI. There were no results outside the specified normal range.      The needle EMG study was normal in all 7 tested muscles: R. Deltoid, R. Biceps brachii, R. Triceps brachii, R. Pronator teres, R. First dorsal interosseous, R. Abductor pollicis brevis, R. Cervical paraspinals.      Electrodiagnostic Impression:  1. Essentially normal electrodiagnostic study.  2. There is insufficient electrodiagnostic evidence for diagnoses of peripheral polyneuropathy, focal mononeuropathy, myopathy, or cervical radiculopathy/brachial plexopathy of the right upper extremity.    Plan:  Todays EMG results are incongruent with her symptoms which are highly suggestive of carpal tunnel syndrome.  There is some asymmetry of the right and left median motor responses on nerve conduction studies with her more symptomatic right side  producing a lower amplitude.  However, median to ulnar motor nerve conduction response comparison fails to show any significant difference.  We do have a prior diagnostic ultrasound exam of the right median nerve showing focal enlargement at the level of the carpal tunnel.  The cross-sectional area measured 0.16 cm², with normal limits being 0.12 cm² and below.  With todays test results being within normal limits, it is difficult to recommend surgical intervention.  It is reasonable to repeat right carpal tunnel injection and continue the night splints.  Repeat elective diagnostic testing could be considered in the future if her symptoms do not sindy.    Thank you very much for the referral. Please call if you have any questions regarding this study or the report.       -------------------------------  Rick Montoya M.D.

## 2018-07-16 RX ORDER — ALBUTEROL SULFATE 90 UG/1
AEROSOL, METERED RESPIRATORY (INHALATION)
Qty: 18 G | Refills: 0 | Status: SHIPPED | OUTPATIENT
Start: 2018-07-16

## 2018-07-27 RX ORDER — LEVOTHYROXINE SODIUM 88 UG/1
TABLET ORAL
Qty: 30 TABLET | Refills: 3 | Status: SHIPPED | OUTPATIENT
Start: 2018-07-27

## 2018-07-30 RX ORDER — ALBUTEROL SULFATE 90 UG/1
AEROSOL, METERED RESPIRATORY (INHALATION)
Qty: 18 G | Refills: 0 | Status: SHIPPED | OUTPATIENT
Start: 2018-07-30

## 2020-10-13 NOTE — TELEPHONE ENCOUNTER
[employer] message sent for appt 1/11/18 at 1pm, unable to reach pt at this #   Rotation Flap Text: We discussed various closure modalities with the patient, including healing by second intention, primary closure, skin graft and various flaps.  The location and configuration of the defect indicated that an rotation flap would result in the least disturbance of the position and function of the surrounding anatomic structures, and provide the best result.  The technique, its benefits, alternatives and risks were discussed with the patient.  The patient underwent the procedure as follows: The patient was positioned supine on the operating room table.  The area of the defect and the surrounding skin were anesthetized with buffered 1% lidocaine with epinephrine.  The area was washed with chlorhexidine.  Sterile drapes were applied. \\n\\nThe wound edges were debeveled and extensively undermined.  A rotation flap was designed, incised, and elevated.  Hemostasis was achieved with spot electrodesiccation.  The flap was rotated into position to cover the primary defect and a key suture was used to secure the flap into place.  Additional buried interrupted sutures were used to close the arms of the flap by the rule of halves to share out the unequal lengths.  The standing cones so created by the design of the flap was removed to fat by triangulation.  Final cutaneous approximation was achieved.  The closure was manually tested and found to be sound for strength and hemostasis.

## 2024-07-26 PROBLEM — Z34.90 TERM PREGNANCY: Status: ACTIVE | Noted: 2024-07-26

## 2025-06-09 ENCOUNTER — LAB VISIT (OUTPATIENT)
Dept: LAB | Facility: HOSPITAL | Age: 35
End: 2025-06-09
Attending: NURSE PRACTITIONER
Payer: MEDICAID

## 2025-06-09 DIAGNOSIS — E78.5 HYPERLIPIDEMIA, UNSPECIFIED HYPERLIPIDEMIA TYPE: ICD-10-CM

## 2025-06-09 DIAGNOSIS — E03.9 PRIMARY HYPOTHYROIDISM: ICD-10-CM

## 2025-06-09 DIAGNOSIS — Z00.00 ROUTINE GENERAL MEDICAL EXAMINATION AT A HEALTH CARE FACILITY: Primary | ICD-10-CM

## 2025-06-09 DIAGNOSIS — E55.9 VITAMIN D DEFICIENCY DISEASE: ICD-10-CM

## 2025-06-09 DIAGNOSIS — R73.03 DIABETES MELLITUS, LATENT: ICD-10-CM

## 2025-06-09 LAB
25(OH)D3+25(OH)D2 SERPL-MCNC: 31 NG/ML (ref 30–96)
ABSOLUTE EOSINOPHIL (OHS): 0.15 K/UL
ABSOLUTE MONOCYTE (OHS): 0.47 K/UL (ref 0.3–1)
ABSOLUTE NEUTROPHIL COUNT (OHS): 4.38 K/UL (ref 1.8–7.7)
ALBUMIN SERPL BCP-MCNC: 3.6 G/DL (ref 3.5–5.2)
ALBUMIN/CREAT UR: 22.5 UG/MG
ALP SERPL-CCNC: 54 UNIT/L (ref 40–150)
ALT SERPL W/O P-5'-P-CCNC: 11 UNIT/L (ref 10–44)
ANION GAP (OHS): 10 MMOL/L (ref 8–16)
AST SERPL-CCNC: 14 UNIT/L (ref 11–45)
BASOPHILS # BLD AUTO: 0.05 K/UL
BASOPHILS NFR BLD AUTO: 0.7 %
BILIRUB SERPL-MCNC: 0.2 MG/DL (ref 0.1–1)
BILIRUB UR QL STRIP.AUTO: NEGATIVE
BUN SERPL-MCNC: 13 MG/DL (ref 6–20)
CALCIUM SERPL-MCNC: 9.4 MG/DL (ref 8.7–10.5)
CHLORIDE SERPL-SCNC: 105 MMOL/L (ref 95–110)
CHOLEST SERPL-MCNC: 264 MG/DL (ref 120–199)
CHOLEST/HDLC SERPL: 4.5 {RATIO} (ref 2–5)
CLARITY UR: CLEAR
CO2 SERPL-SCNC: 20 MMOL/L (ref 23–29)
COLOR UR AUTO: YELLOW
CREAT SERPL-MCNC: 0.7 MG/DL (ref 0.5–1.4)
CREAT UR-MCNC: 129 MG/DL (ref 15–325)
EAG (OHS): 108 MG/DL (ref 68–131)
ERYTHROCYTE [DISTWIDTH] IN BLOOD BY AUTOMATED COUNT: 13.9 % (ref 11.5–14.5)
GFR SERPLBLD CREATININE-BSD FMLA CKD-EPI: >60 ML/MIN/1.73/M2
GLUCOSE SERPL-MCNC: 80 MG/DL (ref 70–110)
GLUCOSE UR QL STRIP: NEGATIVE
HBA1C MFR BLD: 5.4 % (ref 4–5.6)
HCT VFR BLD AUTO: 45.7 % (ref 37–48.5)
HDLC SERPL-MCNC: 59 MG/DL (ref 40–75)
HDLC SERPL: 22.3 % (ref 20–50)
HGB BLD-MCNC: 14 GM/DL (ref 12–16)
HGB UR QL STRIP: ABNORMAL
IMM GRANULOCYTES # BLD AUTO: 0.02 K/UL (ref 0–0.04)
IMM GRANULOCYTES NFR BLD AUTO: 0.3 % (ref 0–0.5)
KETONES UR QL STRIP: NEGATIVE
LDLC SERPL CALC-MCNC: 166.4 MG/DL (ref 63–159)
LEUKOCYTE ESTERASE UR QL STRIP: NEGATIVE
LYMPHOCYTES # BLD AUTO: 2.01 K/UL (ref 1–4.8)
MCH RBC QN AUTO: 28.1 PG (ref 27–31)
MCHC RBC AUTO-ENTMCNC: 30.6 G/DL (ref 32–36)
MCV RBC AUTO: 92 FL (ref 82–98)
MICROALBUMIN UR-MCNC: 29 UG/ML (ref ?–5000)
NITRITE UR QL STRIP: NEGATIVE
NONHDLC SERPL-MCNC: 205 MG/DL
NUCLEATED RBC (/100WBC) (OHS): 0 /100 WBC
PH UR STRIP: 6 [PH]
PLATELET # BLD AUTO: 229 K/UL (ref 150–450)
PMV BLD AUTO: 12.4 FL (ref 9.2–12.9)
POTASSIUM SERPL-SCNC: 4.6 MMOL/L (ref 3.5–5.1)
PROT SERPL-MCNC: 7.6 GM/DL (ref 6–8.4)
PROT UR QL STRIP: NEGATIVE
RBC # BLD AUTO: 4.99 M/UL (ref 4–5.4)
RELATIVE EOSINOPHIL (OHS): 2.1 %
RELATIVE LYMPHOCYTE (OHS): 28.4 % (ref 18–48)
RELATIVE MONOCYTE (OHS): 6.6 % (ref 4–15)
RELATIVE NEUTROPHIL (OHS): 61.9 % (ref 38–73)
SODIUM SERPL-SCNC: 135 MMOL/L (ref 136–145)
SP GR UR STRIP: 1.02
T3FREE SERPL-MCNC: 2.8 PG/ML (ref 2.3–4.2)
T4 FREE SERPL-MCNC: 0.83 NG/DL (ref 0.71–1.51)
TRIGL SERPL-MCNC: 193 MG/DL (ref 30–150)
TSH SERPL-ACNC: 9.44 UIU/ML (ref 0.4–4)
WBC # BLD AUTO: 7.08 K/UL (ref 3.9–12.7)

## 2025-06-09 PROCEDURE — 80053 COMPREHEN METABOLIC PANEL: CPT

## 2025-06-09 PROCEDURE — 82570 ASSAY OF URINE CREATININE: CPT

## 2025-06-09 PROCEDURE — 84481 FREE ASSAY (FT-3): CPT

## 2025-06-09 PROCEDURE — 36415 COLL VENOUS BLD VENIPUNCTURE: CPT | Mod: PO

## 2025-06-09 PROCEDURE — 82306 VITAMIN D 25 HYDROXY: CPT

## 2025-06-09 PROCEDURE — 84439 ASSAY OF FREE THYROXINE: CPT

## 2025-06-09 PROCEDURE — 84443 ASSAY THYROID STIM HORMONE: CPT

## 2025-06-09 PROCEDURE — 81003 URINALYSIS AUTO W/O SCOPE: CPT | Mod: PO

## 2025-06-09 PROCEDURE — 80061 LIPID PANEL: CPT

## 2025-06-09 PROCEDURE — 85025 COMPLETE CBC W/AUTO DIFF WBC: CPT

## 2025-06-09 PROCEDURE — 83036 HEMOGLOBIN GLYCOSYLATED A1C: CPT

## (undated) DEVICE — SEE MEDLINE ITEM 157128

## (undated) DEVICE — SEE MEDLINE ITEM 152530

## (undated) DEVICE — LEGGINGS 48X31 INCH

## (undated) DEVICE — BLADE SURG STAINLESS STEEL #15

## (undated) DEVICE — ELECTRODE COOLPULSE 90 W/HAND

## (undated) DEVICE — SEE MEDLINE ITEM 152523

## (undated) DEVICE — TUBE SET INFLOW/OUTFLOW

## (undated) DEVICE — PAD COLD THERAPY KNEE WRAP ON

## (undated) DEVICE — MARKER SKIN STND TIP BLUE BARR

## (undated) DEVICE — DEVICE CHIA PERCPASSER

## (undated) DEVICE — SEE MEDLINE ITEM 152622

## (undated) DEVICE — SEE MEDLINE ITEM 153151

## (undated) DEVICE — ADHESIVE MASTISOL VIAL 48/BX

## (undated) DEVICE — DRAPE PLASTIC U 60X72

## (undated) DEVICE — SOL IRR NACL .9% 3000ML

## (undated) DEVICE — NDL SPINAL 18GX3.5 SPINOCAN

## (undated) DEVICE — SEE L#120831

## (undated) DEVICE — SUT LOOP 2 HF 20 SN WH BL

## (undated) DEVICE — BLADE ULTRACUT 5.5

## (undated) DEVICE — BLADE ULTRACUT 3.5MM

## (undated) DEVICE — PAD ABD 8X10 STERILE

## (undated) DEVICE — APPLICATOR CHLORAPREP ORN 26ML

## (undated) DEVICE — Device

## (undated) DEVICE — ACL DISPOSABLE KIT

## (undated) DEVICE — SUT CTD VICRYL 0 UND BR

## (undated) DEVICE — SUT MONOCYRL 4-0 PS2 UND

## (undated) DEVICE — SEE MEDLINE ITEM 157131

## (undated) DEVICE — SEE MEDLINE ITEM 157216

## (undated) DEVICE — UNDERGLOVES BIOGEL PI SZ 7 LF

## (undated) DEVICE — SUT IDEAL SHUTTLE LEFT

## (undated) DEVICE — GAUZE SPONGE 4X4 12PLY

## (undated) DEVICE — DRAPE EMERALD 87X114.75X113

## (undated) DEVICE — UNDERGLOVE BIOGEL PI SZ 6.5 LF

## (undated) DEVICE — COVER MAYO STAND REINFRCD 30

## (undated) DEVICE — ELECTRODE REM PLYHSV RETURN 9

## (undated) DEVICE — SEE MEDLINE ITEM 146313

## (undated) DEVICE — SEE MEDLINE ITEM 157126

## (undated) DEVICE — CLOSURE SKIN STERI STRIP 1/2X4

## (undated) DEVICE — INSTRUMENT DRIVER MODULAR 30MM

## (undated) DEVICE — SUT CTD VICRYL 3-0 CR/SH

## (undated) DEVICE — MAT QUICK 40X30 FLOOR FLUID LF

## (undated) DEVICE — TOURNIQUET SB QC SP 34X4IN

## (undated) DEVICE — SUT PDS VIL/BLU DUAL ORTHO

## (undated) DEVICE — GOWN SURG  X-LG

## (undated) DEVICE — DRESSING XEROFORM FOIL PK 1X8

## (undated) DEVICE — PENCIL ELECTROSURG HOLST W/BLD

## (undated) DEVICE — PUMP COLD THERAPY

## (undated) DEVICE — NEPTUNE 4 PORT MANIFOLD

## (undated) DEVICE — PAD CAST SPECIALIST STRL 6

## (undated) DEVICE — MAT EVAC SURGISAFE 36 X 48

## (undated) DEVICE — DRAPE STERI U-SHAPED 47X51IN